# Patient Record
Sex: FEMALE | Employment: OTHER | URBAN - METROPOLITAN AREA
[De-identification: names, ages, dates, MRNs, and addresses within clinical notes are randomized per-mention and may not be internally consistent; named-entity substitution may affect disease eponyms.]

---

## 2018-06-15 ENCOUNTER — APPOINTMENT (OUTPATIENT)
Dept: ULTRASOUND IMAGING | Facility: CLINIC | Age: 38
End: 2018-06-15
Attending: EMERGENCY MEDICINE

## 2018-06-15 ENCOUNTER — HOSPITAL ENCOUNTER (EMERGENCY)
Facility: CLINIC | Age: 38
Discharge: HOME | End: 2018-06-15
Attending: EMERGENCY MEDICINE | Admitting: EMERGENCY MEDICINE

## 2018-06-15 VITALS
TEMPERATURE: 98.3 F | WEIGHT: 168 LBS | OXYGEN SATURATION: 98 % | HEART RATE: 98 BPM | SYSTOLIC BLOOD PRESSURE: 102 MMHG | BODY MASS INDEX: 30.91 KG/M2 | RESPIRATION RATE: 18 BRPM | HEIGHT: 62 IN | DIASTOLIC BLOOD PRESSURE: 90 MMHG

## 2018-06-15 DIAGNOSIS — O20.9 VAGINAL BLEEDING IN PREGNANCY, FIRST TRIMESTER: ICD-10-CM

## 2018-06-15 LAB
ABO + RH BLD: NORMAL
ABO + RH BLD: NORMAL
ALBUMIN UR-MCNC: NEGATIVE MG/DL
APPEARANCE UR: CLEAR
B-HCG SERPL-ACNC: 3443 IU/L (ref 0–5)
BILIRUB UR QL STRIP: NEGATIVE
BLOOD BANK CMNT PATIENT-IMP: NORMAL
BLOOD BANK CMNT PATIENT-IMP: NORMAL
COLOR UR AUTO: ABNORMAL
FETAL CELL SCN BLD QL ROSETTE: NORMAL
GLUCOSE BLDC GLUCOMTR-MCNC: 143 MG/DL (ref 70–99)
GLUCOSE UR STRIP-MCNC: NEGATIVE MG/DL
HGB UR QL STRIP: ABNORMAL
KETONES UR STRIP-MCNC: NEGATIVE MG/DL
LEUKOCYTE ESTERASE UR QL STRIP: NEGATIVE
NITRATE UR QL: NEGATIVE
PH UR STRIP: 6 PH (ref 5–7)
RBC #/AREA URNS AUTO: <1 /HPF (ref 0–2)
RH IG VIALS RECOM PATIENT: NORMAL
SOURCE: ABNORMAL
SP GR UR STRIP: 1 (ref 1–1.03)
SQUAMOUS #/AREA URNS AUTO: <1 /HPF (ref 0–1)
UROBILINOGEN UR STRIP-MCNC: NORMAL MG/DL (ref 0–2)
WBC #/AREA URNS AUTO: 0 /HPF (ref 0–5)

## 2018-06-15 PROCEDURE — 85461 HEMOGLOBIN FETAL: CPT | Performed by: EMERGENCY MEDICINE

## 2018-06-15 PROCEDURE — 99284 EMERGENCY DEPT VISIT MOD MDM: CPT | Mod: 25

## 2018-06-15 PROCEDURE — 81001 URINALYSIS AUTO W/SCOPE: CPT | Performed by: EMERGENCY MEDICINE

## 2018-06-15 PROCEDURE — 76801 OB US < 14 WKS SINGLE FETUS: CPT

## 2018-06-15 PROCEDURE — 00000146 ZZHCL STATISTIC GLUCOSE BY METER IP

## 2018-06-15 PROCEDURE — 86900 BLOOD TYPING SEROLOGIC ABO: CPT | Performed by: EMERGENCY MEDICINE

## 2018-06-15 PROCEDURE — 86901 BLOOD TYPING SEROLOGIC RH(D): CPT | Performed by: EMERGENCY MEDICINE

## 2018-06-15 PROCEDURE — 84702 CHORIONIC GONADOTROPIN TEST: CPT | Performed by: EMERGENCY MEDICINE

## 2018-06-15 ASSESSMENT — ENCOUNTER SYMPTOMS
CHILLS: 1
DIAPHORESIS: 1
BACK PAIN: 1
FEVER: 0
ABDOMINAL PAIN: 1
HEMATURIA: 1

## 2018-06-15 NOTE — DISCHARGE INSTRUCTIONS
Sangrado en la primera etapa del embarazo     La ecografía puede ayudar a comprobar la allan de matias feto.     Si ha sangrado en las primeras etapas de matias embarazo, sepa que no es la única. Muchas otras mujeres embarazadas cm tenido también sangrado al comienzo del embarazo. En la mayoría de los casos, no significa un problema. [place], incluso así, matias proveedor de atención médica tiene que saberlo, ya que quizás quiera hacerle pruebas para saber por qué ha sangrado. Llame a matias proveedor de atención médica si nota que ha sangrado mientras está embarazada.   Cuáles son las causas de un sangrado en las primeras etapas?  Con frecuencia, no se conocen las causas del sangrado en las primeras etapas. [place], hay muchos factores en el comienzo del embarazo que pueden ocasionar sangrado o manchado, por ejemplo, tino las relaciones sexuales (que pueden causar sangrado en cualquiera de los trimestres). Las siguientes son otras causas probables:    Implantación del embrión en la pared uterina    Hemorragia subcoriónica (sangrado entre el corion y el útero)    Aborto espontáneo    Embarazo ectópico (en rios de las trompas de Falopio)  Si nota un manchado  El manchado (causado por un sangrado muy leve) es el tipo de sangrado más común a principios del embarazo. Si lo nota, llame a matias proveedor de atención médica. Es muy probable que le diga que puede cuidarse en matias casa.  Si necesita hacerse algún examen  Según cuánto haya sangrado, puede que matias otro proveedor de atención médica le pidan hacerse algunos exámenes. Por ejemplo, un examen pélvico, puede ayudar a comprobar qué tan avanzado está el embarazo. También puede que le lupe rios ecografía común o rios ecografía Doppler. Estas pruebas de diagnóstico por imágenes emplean ondas de beka para comprobar la allan de matias feto. Pueden hacerle la ecografía sobre el vientre o por dentro de matias vagina. También es posible que matias proveedor de atención médica le pida un análisis de ivett  especial. [place] análisis compara maxx niveles hormonales en muestras de ivett tomadas con dos días de diferencia. Los resultados pueden ayudar a jeffrey proveedor de atención médica a saber más sobre la implantación del embrión. También necesitarán comprobar jeffrey tipo de ivett para evaluar si es necesario tratarla por sensibilización al factor RH.   Signos de advertencia  Si jeffrey sangrado no se detiene o si nota cualquiera de los siguientes síntomas, busque ayuda médica de inmediato:    Empapa rios toallita sanitaria por hora.    Tiene sangrado rebekah si tuviera jeffrey período.    Tiene cólicos o dolor de estómago muy intensos.    Se siente mareada o se desmaya.    Expulsa tejido a través de jeffrey vagina.    Tiene un sangrado en cualquier momento después de jeffrey primer trimestre.  Preguntas que quizás le lupe  Si garht no es normal, es común algo de sangrado a principios del embarazo. Si notó algo de sangrado, seguramente se preocupará. Tenga en cuenta que el sangrado por sí solo no significa que haya algún problema. No obstante, llame a jeffrey proveedor de atención médica de inmediato. Jeffrey proveedor puede hacerle preguntas rebekah estas para intentar detectar la causa del sangrado:     Cuándo comenzó el sangrado?     Es un sangrado muy leve (manchado) o se parece a jeffrey periodo?     Es la ivett de color ely brillante o es amarronada?     Miranda mantenido relaciones sexuales recientemente?     Ha tenido dolor o cólicos?     Se miranda sentido mareada o se ha desmayado?  Vigilancia de jeffrey embarazo  Con frecuencia, el sangrado se detendrá igual de rápido rebekah comenzó. Jeffrey embarazo puede, entonces, seguir jeffrey transcurso normal. Es posible que necesite algunas visitas prenatales adicionales. [place], usted y jeffrey bebé muy probablemente estarán garth.  Date Last Reviewed: 6/1/2016 2000-2017 [employer] [address] [place] [address], [place] [address]. Todos los derechos reservados. Esta información no pretende sustituir la atención médica profesional. Sólo  matias médico puede diagnosticar y tratar un problema de allan.      Bleeding During Early Pregnancy     Ultrasound can help check the health of your fetus.     If you ve had bleeding early in your pregnancy, you re not alone. Many other pregnant women have had early bleeding, too. And in most cases, nothing is wrong. But your healthcare provider still needs to know about it. He or she may want to do tests to find out why you re bleeding. Call your healthcare provider if you notice bleeding during pregnancy.  What causes early bleeding?  The cause of bleeding early in pregnancy is often unknown. But many factors early on in pregnancy may lead to bleeding or spotting. These include sexual intercourse, which may cause bleeding in any trimester. Here are some other causes:    Implantation of the embryo on the uterine wall    Subchorionic hemorrhage (bleeding between the sac membrane and the uterus)    Miscarriage    Ectopic (tubal) pregnancy  If you notice spotting  Spotting (very light bleeding) is the most common type of bleeding in early pregnancy. If you notice it, call your healthcare provider. Chances are, he or she will tell you that you can care for yourself at home.  If tests are needed  Depending on how much you bleed, your healthcare provider may ask you to come in for some tests. A pelvic exam, for instance, can help see how far along your pregnancy is. You also may have an ultrasound or a Doppler test. These imaging tests use sound waves to check the health of your fetus. The ultrasound may be done on your belly or inside your vagina. Your healthcare provider also may order a special blood test. This test compares your hormone levels in blood samples taken 2 days apart. The results can help your healthcare provider learn more about the implantation of the embryo. Your blood type will also need to be checked to evaluate whether you will need to be treated for Rh sensitization.   Warning signs  If your bleeding  doesn t stop or if you notice any of the following, seek medical help right away:    Soaking a sanitary pad each hour    Bleeding like you re having a period    Cramping or severe belly pain    Feeling dizzy or faint    Tissue passing through your vagina    Bleeding at any time after the first trimester  Questions you may be asked  Though not normal, bleeding early in pregnancy is common. If you ve noticed any bleeding, you may be concerned. But keep in mind that bleeding alone doesn t mean something is wrong. Call your healthcare provider right away, though. He or she may ask you questions like these to help find the cause of your bleeding:    When did your bleeding start?    Is your bleeding very light (spotting) or is it like a period?    Is the blood bright red or brownish?    Have you had sexual intercourse recently?    Have you had pain or cramping?    Have you felt dizzy or faint?  Monitoring your pregnancy  Bleeding will often stop as quickly as it began. Your pregnancy may go on a normal path again. You may need to make a few extra prenatal visits. But you and your baby will most likely be fine.  Date Last Reviewed: 6/1/2016 2000-2017 [employer]. [address] [place] [address], [place] [address]. All rights reserved. This information is not intended as a substitute for professional medical care. Always follow your healthcare professional's instructions.

## 2018-06-15 NOTE — ED AVS SNAPSHOT
Emergency Department    [address] [facility] [address]    Phone:  [phone]    Fax:  [phone]                                       Yanet Reyes Avila   MRN: [id number]    Department:   Emergency Department   Date of Visit:  6/15/2018           Patient Information     Date Of Birth          1980        Your diagnoses for this visit were:     Vaginal bleeding in pregnancy, first trimester        You were seen by Armando Armstrong MD.      Follow-up Information     Please follow up.    Why:  follow up with OBGYN this week        Discharge Instructions         Sangrado en la primera etapa del embarazo     La ecografía puede ayudar a comprobar la allan de matias feto.     Si ha sangrado en las primeras etapas de matias embarazo, sepa que no es la única. Muchas otras mujeres embarazadas cm tenido también sangrado al comienzo del embarazo. En la mayoría de los casos, no significa un problema. [place], incluso así, matias proveedor de atención médica tiene que saberlo, ya que quizás quiera hacerle pruebas para saber por qué ha sangrado. Llame a matias proveedor de atención médica si nota que ha sangrado mientras está embarazada.   Cuáles son las causas de un sangrado en las primeras etapas?  Con frecuencia, no se conocen las causas del sangrado en las primeras etapas. [place], hay muchos factores en el comienzo del embarazo que pueden ocasionar sangrado o manchado, por ejemplo, tino las relaciones sexuales (que pueden causar sangrado en cualquiera de los trimestres). Las siguientes son otras causas probables:    Implantación del embrión en la pared uterina    Hemorragia subcoriónica (sangrado entre el corion y el útero)    Aborto espontáneo    Embarazo ectópico (en rios de las trompas de Falopio)  Si nota un manchado  El manchado (causado por un sangrado muy leve) es el tipo de sangrado más común a principios del embarazo. Si lo nota, llame a matias proveedor de atención médica. Es muy probable que le diga que  puede cuidarse en matias casa.  Si necesita hacerse algún examen  Según cuánto haya sangrado, puede que matias otro proveedor de atención médica le pidan hacerse algunos exámenes. Por ejemplo, un examen pélvico, puede ayudar a comprobar qué tan avanzado está el embarazo. También puede que le lupe rios ecografía común o rios ecografía Doppler. Estas pruebas de diagnóstico por imágenes emplean ondas de beka para comprobar la allan de matias feto. Pueden hacerle la ecografía sobre el vientre o por dentro de matias vagina. También es posible que matias proveedor de atención médica le pida un análisis de ivett especial. [place] análisis compara maxx niveles hormonales en muestras de ivett tomadas con dos días de diferencia. Los resultados pueden ayudar a matias proveedor de atención médica a saber más sobre la implantación del embrión. También necesitarán comprobar matias tipo de ivett para evaluar si es necesario tratarla por sensibilización al factor RH.   Signos de advertencia  Si matias sangrado no se detiene o si nota cualquiera de los siguientes síntomas, busque ayuda médica de inmediato:    Empapa rios toallita sanitaria por hora.    Tiene sangrado rebekah si tuviera matias período.    Tiene cólicos o dolor de estómago muy intensos.    Se siente mareada o se desmaya.    Expulsa tejido a través de matias vagina.    Tiene un sangrado en cualquier momento después de matias primer trimestre.  Preguntas que quizás le lupe  Si garth no es normal, es común algo de sangrado a principios del embarazo. Si notó algo de sangrado, seguramente se preocupará. Tenga en cuenta que el sangrado por sí solo no significa que haya algún problema. No obstante, llame a matias proveedor de atención médica de inmediato. Matias proveedor puede hacerle preguntas rebekah estas para intentar detectar la causa del sangrado:     Cuándo comenzó el sangrado?     Es un sangrado muy leve (manchado) o se parece a matias periodo?     Es la ivett de color ely brillante o es amarronada?     Rodriguez mantenido  relaciones sexuales recientemente?     Ha tenido dolor o cólicos?     Se miranda sentido mareada o se ha desmayado?  Vigilancia de matias embarazo  Con frecuencia, el sangrado se detendrá igual de rápido rebekah comenzó. Matias embarazo puede, entonces, seguir matias transcurso normal. Es posible que necesite algunas visitas prenatales adicionales. [place], usted y matias bebé muy probablemente estarán garth.  Date Last Reviewed: 6/1/2016 2000-2017 [email or web address]. [address] [place] [address], [place] [address]. Todos los derechos reservados. Esta información no pretende sustituir la atención médica profesional. Sólo matias médico puede diagnosticar y tratar un problema de allan.      Bleeding During Early Pregnancy     Ultrasound can help check the health of your fetus.     If you ve had bleeding early in your pregnancy, you re not alone. Many other pregnant women have had early bleeding, too. And in most cases, nothing is wrong. But your healthcare provider still needs to know about it. He or she may want to do tests to find out why you re bleeding. Call your healthcare provider if you notice bleeding during pregnancy.  What causes early bleeding?  The cause of bleeding early in pregnancy is often unknown. But many factors early on in pregnancy may lead to bleeding or spotting. These include sexual intercourse, which may cause bleeding in any trimester. Here are some other causes:    Implantation of the embryo on the uterine wall    Subchorionic hemorrhage (bleeding between the sac membrane and the uterus)    Miscarriage    Ectopic (tubal) pregnancy  If you notice spotting  Spotting (very light bleeding) is the most common type of bleeding in early pregnancy. If you notice it, call your healthcare provider. Chances are, he or she will tell you that you can care for yourself at home.  If tests are needed  Depending on how much you bleed, your healthcare provider may ask you to come in for some tests. A pelvic exam, for instance, can help  see how far along your pregnancy is. You also may have an ultrasound or a Doppler test. These imaging tests use sound waves to check the health of your fetus. The ultrasound may be done on your belly or inside your vagina. Your healthcare provider also may order a special blood test. This test compares your hormone levels in blood samples taken 2 days apart. The results can help your healthcare provider learn more about the implantation of the embryo. Your blood type will also need to be checked to evaluate whether you will need to be treated for Rh sensitization.   Warning signs  If your bleeding doesn t stop or if you notice any of the following, seek medical help right away:    Soaking a sanitary pad each hour    Bleeding like you re having a period    Cramping or severe belly pain    Feeling dizzy or faint    Tissue passing through your vagina    Bleeding at any time after the first trimester  Questions you may be asked  Though not normal, bleeding early in pregnancy is common. If you ve noticed any bleeding, you may be concerned. But keep in mind that bleeding alone doesn t mean something is wrong. Call your healthcare provider right away, though. He or she may ask you questions like these to help find the cause of your bleeding:    When did your bleeding start?    Is your bleeding very light (spotting) or is it like a period?    Is the blood bright red or brownish?    Have you had sexual intercourse recently?    Have you had pain or cramping?    Have you felt dizzy or faint?  Monitoring your pregnancy  Bleeding will often stop as quickly as it began. Your pregnancy may go on a normal path again. You may need to make a few extra prenatal visits. But you and your baby will most likely be fine.  Date Last Reviewed: 6/1/2016 2000-2017 [employer]. [address] [place] [address], [place] [address]. All rights reserved. This information is not intended as a substitute for professional medical care. Always  follow your healthcare professional's instructions.            24 Hour Appointment Hotline       To make an appointment at any [facility], call [phone] ([phone]). If you don't have a family doctor or clinic, we will help you find one. [facility] are conveniently located to serve the needs of you and your family.             Review of your medicines      Notice     You have not been prescribed any medications.            Procedures and tests performed during your visit     Glucose by meter    HCG quantitative pregnancy (blood)    Rh Immune Globulin Study    Rho (D) immune globulin (RhoGam) Lab Study    UA with Microscopic    US OB < 14 Weeks w Transvaginal      Orders Needing Specimen Collection     None      Pending Results     No orders found from 6/13/2018 to 6/16/2018.            Pending Culture Results     No orders found from 6/13/2018 to 6/16/2018.            Pending Results Instructions     If you had any lab results that were not finalized at the time of your Discharge, you can call the ED Lab Result RN at [phone]. You will be contacted by this team for any positive Lab results or changes in treatment. The nurses are available 7 days a week from 10A to 6:30P.  You can leave a message 24 hours per day and they will return your call.        Test Results From Your Hospital Stay        6/15/2018  4:18 PM      Component Results     Component    Rhogam Order    Order received    Comment:    See Rhogam Study/Suitability         6/15/2018  4:20 PM      Component Results     Component Value Ref Range & Units Status    Color Urine Straw  Final    Appearance Urine Clear  Final    Glucose Urine Negative NEG^Negative mg/dL Final    Bilirubin Urine Negative NEG^Negative Final    Ketones Urine Negative NEG^Negative mg/dL Final    Specific Gravity Urine 1.003 1.003 - 1.035 Final    Blood Urine Trace (A) NEG^Negative Final    pH Urine 6.0 5.0 - 7.0 pH Final    Protein Albumin Urine Negative  NEG^Negative mg/dL Final    Urobilinogen mg/dL Normal 0.0 - 2.0 mg/dL Final    Nitrite Urine Negative NEG^Negative Final    Leukocyte Esterase Urine Negative NEG^Negative Final    Source Midstream Urine  Final    WBC Urine 0 0 - 5 /HPF Final    RBC Urine <1 0 - 2 /HPF Final    Squamous Epithelial /HPF Urine <1 0 - 1 /HPF Final         6/15/2018  5:08 PM      Narrative     US OB <14 WEEKS WITH TRANSVAGINAL SINGLE  6/15/2018 4:44 PM    HISTORY: Pelvic pain, vaginal spotting, early pregnancy.     TECHNIQUE: Transabdominal and transvaginal imaging were performed.   Transvaginal imaging was performed to better evaluate the uterus and  gestational sac.    COMPARISON:  None.    FINDINGS:      Estimated gestational age by current ultrasound measurement:  Gestational sac measures 5 weeks 3 days.  Estimated date of delivery based on this ultrasound: 1/10/2019.  Patient reported LMP: 4/5/2018.  Estimated gestational age by reported LMP: 10 weeks 1 day.    Crown-rump length: No fetal pole is currently identified.  Embryonic cardiac activity: Not applicable.  Yolk sac: Not identified.  Subchorionic hemorrhage: 0.4 x 0.3 x 0.2 cm.    Right ovary: Corpus luteal cyst.  Left ovary: Unremarkable.  Adnexal mass: None.  Free pelvic fluid: None.        Impression     IMPRESSION: Intrauterine gestational sac measures 5 weeks 3 days.  Gestational age by LMP dating should be 10 weeks 1 day with a stated  LMP date of 4/5/2018. Findings could be due to a nonviable pregnancy  or incorrect LMP dating. Follow-up beta-hCG and ultrasound as needed  for confirmation.    ELIZABETH CAPUTO MD         6/15/2018  4:53 PM      Component Results     Component Value Ref Range & Units Status    HCG Quantitative Serum 3443 (H) 0 - 5 IU/L Final         6/15/2018  4:46 PM      Component Results     Component    ABO    B    RH(D)    Pos    Fetal Blood Screen    Canceled, Test credited    Blood Bank Comment    Not suitable for Rh Immune Globulin  Patient is Rh  positive      Amount of RHIG Required    Not suitable for Rh Immune Globulin         6/15/2018  5:10 PM      Component Results     Component Value Ref Range & Units Status    Glucose 143 (H) 70 - 99 mg/dL Final                Clinical Quality Measure: Blood Pressure Screening     Your blood pressure was checked while you were in the emergency department today. The last reading we obtained was  BP: 102/90 . Please read the guidelines below about what these numbers mean and what you should do about them.  If your systolic blood pressure (the top number) is less than 120 and your diastolic blood pressure (the bottom number) is less than 80, then your blood pressure is normal. There is nothing more that you need to do about it.  If your systolic blood pressure (the top number) is 120-139 or your diastolic blood pressure (the bottom number) is 80-89, your blood pressure may be higher than it should be. You should have your blood pressure rechecked within a year by a primary care provider.  If your systolic blood pressure (the top number) is 140 or greater or your diastolic blood pressure (the bottom number) is 90 or greater, you may have high blood pressure. High blood pressure is treatable, but if left untreated over time it can put you at risk for heart attack, stroke, or kidney failure. You should have your blood pressure rechecked by a primary care provider within the next 4 weeks.  If your provider in the emergency department today gave you specific instructions to follow-up with your doctor or provider even sooner than that, you should follow that instruction and not wait for up to 4 weeks for your follow-up visit.        Thank you for choosing [facility]       Thank you for choosing [facility] for your care. Our goal is always to provide you with excellent care. Hearing back from our patients is one way we can continue to improve our services. Please take a few minutes to complete the written survey that you may  "receive in the mail after you visit with us. Thank you!        [facility] Information     [employer] lets you send messages to your doctor, view your test results, renew your prescriptions, schedule appointments and more. To sign up, go to [email or web address] . Click on \"Log in\" on the left side of the screen, which will take you to the Welcome page. Then click on \"Sign up Now\" on the right side of the page.     You will be asked to enter the access code listed below, as well as some personal information. Please follow the directions to create your username and password.     Your access code is: [email or web address]  Expires: 2018  4:39 PM     Your access code will  in 90 days. If you need help or a new code, please call your [place] clinic or [phone].        Care EveryWhere ID     This is your Care EveryWhere ID. This could be used by other organizations to access your [place] medical records  [id number]        Equal Access to Services     SASCHA MARQUEZ AH: Hadii justyna nowak hadasho Soomaali, waaxda luqadaha, qaybta kaalmada adeegyazara, dioni campo . So [facility] [phone].    ATENCIÓN: Si habla español, tiene a matias disposición servicios gratuitos de asistencia lingüística. Llame al [phone].    We comply with applicable federal civil rights laws and [place] laws. We do not discriminate on the basis of race, color, national origin, age, disability, sex, sexual orientation, or gender identity.            After Visit Summary       This is your record. Keep this with you and show to your community pharmacist(s) and doctor(s) at your next visit.                  "

## 2018-06-15 NOTE — ED PROVIDER NOTES
History     Chief Complaint:  Vaginal Bleeding     History limited due to language, interpretor used    HPI   Yanet Reyes Avila is a  38 year old female who presents to the emergency department today for evaluation of vaginal bleeding during pregnancy. The patient reports that about 3 weeks ago she took 2 home pregnancy tests and one test in her doctor office which confirmed she was pregnant. Since then she also has noted some right hip and back pain. She states she started bleeding today along with pain similar to her normal menstrual pain. She states she went to the bathroom and urinated and saw blood and 30 minutes later when she wiped after going to the bathroom again there was blood on the paper. She states she is now bleeding like her period and is wearing a pad. She states she thinking she is bleeding vaginally because there have been a few clots. She reports she is a diabetic and stopped taking her medication when she learned she was pregnant, and is currently checking her sugar 4 times a day. She denies any fevers, but says she woke up yesterday, 18, with chills and sweating. She reports her last period was around 2018. S    Allergies:  No Known Drug Allergies    Medications:    Metformin     Past Medical History:    Diabetes    Past Surgical History:    Surgical history reviewed. No pertinent surgical history.    Family History:    Family history reviewed. No pertinent family history.      Social History:  The patient was accompanied to the ED by her sisters.  Smoking Status: Never Smoker  Smokeless Tobacco: Never Used  Alcohol Use: Negative   Marital Status:  Single      Review of Systems   Constitutional: Positive for chills and diaphoresis. Negative for fever.   Gastrointestinal: Positive for abdominal pain.   Genitourinary: Positive for hematuria and vaginal bleeding.   Musculoskeletal: Positive for back pain.   All other systems reviewed and are negative.    Physical Exam  "    Patient Vitals for the past 24 hrs:   BP Temp Temp src Pulse Resp SpO2 Height Weight   06/15/18 1515 102/90 98.3  F (36.8  C) Oral 98 18 98 % 1.575 m (5' 2\") 76.2 kg (168 lb)        Physical Exam  SKIN:  Warm, dry.  HEMATOLOGIC/IMMUNOLOGIC/LYMPHATIC:  No pallor.  EYES:  Conjunctivae normal.  CARDIOVASCULAR:  Regular rate and rhythm.  RESPIRATORY:  No respiratory distress, breath sounds equal and normal.  GASTROINTESTINAL:  Soft, nontender abdomen.  MUSCULOSKELETAL:  Normal body habitus.  NEUROLOGIC:  Alert, conversant.  PSYCHIATRIC:  Normal mood.    Emergency Department Course     Imaging:  Radiology findings were communicated with the patient who voiced understanding of the findings.    US OB < 14 Weeks w Transvaginal   Intrauterine gestational sac measures 5 weeks 3 days.  Gestational age by LMP dating should be 10 weeks 1 day with a stated  LMP date of 4/5/2018. Findings could be due to a nonviable pregnancy  or incorrect LMP dating. Follow-up beta-hCG and ultrasound as needed  for confirmation.  Reading per radiology    Laboratory:  Laboratory findings were communicated with the patient who voiced understanding of the findings.    Rho (D) immune globulin lab study: order received  HCG quantitative pregnancy: 3443 (H)  Rh immune globulin study: ABO B, Rh(D) pos  UA with microscopic: blood trace (A), o/w WNL    Medications   Blood Bank will determine if patient is eligible for and the proper dosage of Rho (D) immune globulin (RhoGam) (not administered)   NO Rho (D)  immune globulin (RhoGam) needed  - mother INELIGIBLE (not administered)     Emergency Department Course:    1540 The patient provided a urine sample here in the emergency department. This was sent for laboratory testing, findings above.     1545 Nursing notes and vitals reviewed.    1546 I performed an exam of the patient as documented above.     1602 IV was inserted and blood was drawn for laboratory testing, results above.     1613 The patient " was sent for a US OB< 14 weeks while in the emergency department, results above.      1732 I personally reviewed the laboratory and imaging results with the patient and answered all related questions prior to discharge.    Impression & Plan      Medical Decision Making:  Yanet Reyes Avila is a 38 year old female who presents to the emergency department today for evaluation of concern of a first trimester pregnancy and vaginal bleeding. She underwent the above evaluation which was relatively reassuring. Signs of intrauterine pregnancy, but the dates are inconclusive. Certainly will nee follow up for all of this. She is Rh positive. I advised the patient on all of this  and she will need close follow up with the OB/GYN provider likely at [facility] where she has been seen as of late. She should return should she feel worse with severe bleeding or pain or otherwise.     Diagnosis:    ICD-10-CM    1. Vaginal bleeding in pregnancy, first trimester O20.9      Disposition:   The patient is discharged to home.     Discharge Medications:  No discharge medications    Scribe Disclosure:  I, Anabel Vega, am serving as a scribe at 3:46 PM on 6/15/2018 to document services personally performed by Armando Armstrong MD based on my observations and the provider's statements to me.        EMERGENCY DEPARTMENT       Armando Armstrong MD  06/15/18 3530

## 2018-06-15 NOTE — ED AVS SNAPSHOT
Emergency Department    [address] [facility] [address]    Phone:  [phone]    Fax:  [phone]                                       Yanet Reyes Avila   MRN: [id number]    Department:   Emergency Department   Date of Visit:  6/15/2018           After Visit Summary Signature Page     I have received my discharge instructions, and my questions have been answered. I have discussed any challenges I see with this plan with the nurse or doctor.    ..........................................................................................................................................  Patient/Patient Representative Signature      ..........................................................................................................................................  Patient Representative Print Name and Relationship to Patient    ..................................................               ................................................  Date                                            Time    ..........................................................................................................................................  Reviewed by Signature/Title    ...................................................              ..............................................  Date                                                            Time

## 2019-07-05 ENCOUNTER — OFFICE VISIT (OUTPATIENT)
Dept: OBGYN | Facility: CLINIC | Age: 39
End: 2019-07-05
Attending: ADVANCED PRACTICE MIDWIFE
Payer: MEDICAID

## 2019-07-05 ENCOUNTER — ANCILLARY PROCEDURE (OUTPATIENT)
Dept: ULTRASOUND IMAGING | Facility: CLINIC | Age: 39
End: 2019-07-05
Attending: ADVANCED PRACTICE MIDWIFE
Payer: MEDICAID

## 2019-07-05 VITALS
HEIGHT: 63 IN | HEART RATE: 77 BPM | DIASTOLIC BLOOD PRESSURE: 76 MMHG | SYSTOLIC BLOOD PRESSURE: 112 MMHG | WEIGHT: 173 LBS | BODY MASS INDEX: 30.65 KG/M2

## 2019-07-05 DIAGNOSIS — O09.90 SUPERVISION OF HIGH RISK PREGNANCY, ANTEPARTUM: Primary | ICD-10-CM

## 2019-07-05 DIAGNOSIS — K59.00 CONSTIPATION DURING PREGNANCY, ANTEPARTUM: ICD-10-CM

## 2019-07-05 DIAGNOSIS — Z34.91 ENCOUNTER FOR SUPERVISION OF NORMAL PREGNANCY IN FIRST TRIMESTER, UNSPECIFIED GRAVIDITY: ICD-10-CM

## 2019-07-05 DIAGNOSIS — O99.619 CONSTIPATION DURING PREGNANCY, ANTEPARTUM: ICD-10-CM

## 2019-07-05 DIAGNOSIS — O09.529 ANTEPARTUM MULTIGRAVIDA OF ADVANCED MATERNAL AGE: ICD-10-CM

## 2019-07-05 DIAGNOSIS — E11.8 TYPE 2 DIABETES MELLITUS WITH COMPLICATION, UNSPECIFIED WHETHER LONG TERM INSULIN USE: ICD-10-CM

## 2019-07-05 DIAGNOSIS — O21.9 NAUSEA AND VOMITING IN PREGNANCY: ICD-10-CM

## 2019-07-05 PROBLEM — Z12.9 SCREENING FOR CANCER: Status: ACTIVE | Noted: 2017-12-13

## 2019-07-05 PROBLEM — R79.89 ELEVATED LIVER FUNCTION TESTS: Status: ACTIVE | Noted: 2018-12-24

## 2019-07-05 PROBLEM — R87.619 ABNORMAL PAP SMEAR OF CERVIX: Status: ACTIVE | Noted: 2017-11-09

## 2019-07-05 LAB
ABO + RH BLD: NORMAL
ABO + RH BLD: NORMAL
BLD GP AB SCN SERPL QL: NORMAL
BLOOD BANK CMNT PATIENT-IMP: NORMAL
ERYTHROCYTE [DISTWIDTH] IN BLOOD BY AUTOMATED COUNT: 12.6 % (ref 10–15)
HBA1C MFR BLD: 8.2 % (ref 0–5.6)
HCT VFR BLD AUTO: 38.9 % (ref 35–47)
HGB BLD-MCNC: 12.9 G/DL (ref 11.7–15.7)
MCH RBC QN AUTO: 29.5 PG (ref 26.5–33)
MCHC RBC AUTO-ENTMCNC: 33.2 G/DL (ref 31.5–36.5)
MCV RBC AUTO: 89 FL (ref 78–100)
PLATELET # BLD AUTO: 231 10E9/L (ref 150–450)
RBC # BLD AUTO: 4.37 10E12/L (ref 3.8–5.2)
SPECIMEN EXP DATE BLD: NORMAL
WBC # BLD AUTO: 8.9 10E9/L (ref 4–11)

## 2019-07-05 PROCEDURE — 83036 HEMOGLOBIN GLYCOSYLATED A1C: CPT | Performed by: ADVANCED PRACTICE MIDWIFE

## 2019-07-05 PROCEDURE — 86901 BLOOD TYPING SEROLOGIC RH(D): CPT | Performed by: ADVANCED PRACTICE MIDWIFE

## 2019-07-05 PROCEDURE — 82306 VITAMIN D 25 HYDROXY: CPT | Performed by: ADVANCED PRACTICE MIDWIFE

## 2019-07-05 PROCEDURE — 76801 OB US < 14 WKS SINGLE FETUS: CPT

## 2019-07-05 PROCEDURE — 85027 COMPLETE CBC AUTOMATED: CPT | Performed by: ADVANCED PRACTICE MIDWIFE

## 2019-07-05 PROCEDURE — 86850 RBC ANTIBODY SCREEN: CPT | Performed by: ADVANCED PRACTICE MIDWIFE

## 2019-07-05 PROCEDURE — 36415 COLL VENOUS BLD VENIPUNCTURE: CPT | Performed by: ADVANCED PRACTICE MIDWIFE

## 2019-07-05 PROCEDURE — T1013 SIGN LANG/ORAL INTERPRETER: HCPCS | Mod: U3,ZF | Performed by: ADVANCED PRACTICE MIDWIFE

## 2019-07-05 PROCEDURE — 86803 HEPATITIS C AB TEST: CPT | Performed by: ADVANCED PRACTICE MIDWIFE

## 2019-07-05 PROCEDURE — 86762 RUBELLA ANTIBODY: CPT | Performed by: ADVANCED PRACTICE MIDWIFE

## 2019-07-05 PROCEDURE — 87086 URINE CULTURE/COLONY COUNT: CPT | Performed by: ADVANCED PRACTICE MIDWIFE

## 2019-07-05 PROCEDURE — 87340 HEPATITIS B SURFACE AG IA: CPT | Performed by: ADVANCED PRACTICE MIDWIFE

## 2019-07-05 PROCEDURE — 86900 BLOOD TYPING SEROLOGIC ABO: CPT | Performed by: ADVANCED PRACTICE MIDWIFE

## 2019-07-05 PROCEDURE — 86706 HEP B SURFACE ANTIBODY: CPT | Performed by: ADVANCED PRACTICE MIDWIFE

## 2019-07-05 PROCEDURE — 86780 TREPONEMA PALLIDUM: CPT | Performed by: ADVANCED PRACTICE MIDWIFE

## 2019-07-05 PROCEDURE — G0463 HOSPITAL OUTPT CLINIC VISIT: HCPCS | Mod: ZF,25

## 2019-07-05 PROCEDURE — 87389 HIV-1 AG W/HIV-1&-2 AB AG IA: CPT | Performed by: ADVANCED PRACTICE MIDWIFE

## 2019-07-05 RX ORDER — DOCUSATE SODIUM 100 MG/1
100 CAPSULE, LIQUID FILLED ORAL 2 TIMES DAILY
Qty: 90 CAPSULE | Refills: 6 | Status: SHIPPED | OUTPATIENT
Start: 2019-07-05 | End: 2020-02-28

## 2019-07-05 RX ORDER — PRENATAL VIT/IRON FUM/FOLIC AC 27MG-0.8MG
1 TABLET ORAL DAILY
COMMUNITY
End: 2019-10-22

## 2019-07-05 RX ORDER — PYRIDOXINE HCL (VITAMIN B6) 25 MG
25 TABLET ORAL EVERY 8 HOURS
Qty: 90 TABLET | Refills: 6 | Status: SHIPPED | OUTPATIENT
Start: 2019-07-05 | End: 2020-01-16

## 2019-07-05 ASSESSMENT — MIFFLIN-ST. JEOR: SCORE: 1438.85

## 2019-07-05 ASSESSMENT — PAIN SCALES - GENERAL: PAINLEVEL: EXTREME PAIN (8)

## 2019-07-05 NOTE — LETTER
"2019       RE: Yanet Reyes Avila  [address] [facility] [address]     Dear Colleague,    Thank you for referring your patient, Yanet Reyes Avila, to the [facility] at [facility]. Please see a copy of my visit note below.    S OB Intake note  Subjective   39 year old woman presents to clinic for initiation of OB care with FOB and in person .  Patient's last menstrual period was 2019.  at 6w3d by  Estimated Date of Delivery: 2020 based on US confirms. Reviewed dating ultrasound. Pregnancy is planned.      Symptoms since LMP include cramping, nausea, bowel changes, breast tenderness and fatigue. Patient has tried these relief measures: diet modification, small frequent meals and increased rest.  - constipation.  2x/day.  Diffifcult   - Nausea worse when brushing teeth.  Comes/goes other times. Open to Vitamin B6. No emesis.     - Genetic/Infection questionnaire completed, risks include AMA. Pt  does not have a recent known exposure to Parvo or CMV so IgG/IgM testing WILL NOT be ordered.   Have you traveled during the pregnancy?No  Have your sexual partner(s) travelled during the pregnancy?No    - Taking metformin.  Blood sugar when waking up and then sometimes another time in the afternoon. Blood sugars noted on her monitor between  150-298 for the past 2 weeks.  Hasn't seen Diabetes team in \"months\".       - Current Medications    Current Outpatient Medications   Medication Sig Dispense Refill     docusate sodium (COLACE) 100 MG capsule Take 1 capsule (100 mg) by mouth 2 times daily 90 capsule 6     metFORMIN (GLUCOPHAGE) 500 MG tablet Take 500 mg by mouth 2 times daily (with meals)       Prenatal Vit-Fe Fumarate-FA (PRENATAL MULTIVITAMIN W/IRON) 27-0.8 MG tablet Take 1 tablet by mouth daily       vitamin B6 (PYRIDOXINE) 25 MG tablet Take 1 tablet (25 mg) by mouth every 8 hours 90 tablet 6         - Co-morbids  "   Past Medical History:   Diagnosis Date     Diabetes (H)      - Risk for GDM - Has type 2 DM pre gestational     - High Risk for Pre E- meets one of following criteria The patient does h/o Pre Eclampsia, Current multi fetal gestation, Pre Gestational Diabetes (Type 1 or Type 2), chronic hypertension, renal disease, Autoimmune disease (systematic lupus erythematosus, antiphospholipid syndrome) so WILL NOT start low dose aspirin (81mg) starting between 12 and 28 weeks to prevent early onset preeclampsia.    - Moderate risk - Meets high risk criteris.     - The patient  does not have a history of spontaneous  birth so  WILL NOT consider progesterone starting at 16-20 weeks and/or serial transvaginal cervical length ultrasounds from 16-24 weeks.     -The patient does not have a history of immunosuppresion or HIV so Toxoplasma IgG/IgM WILL NOT be ordered.     PERSONAL/SOCIAL HISTORY  Partnerd, he's present and quiet but supportive.   Lives with sister and her family.  Employment: Stopped working. Has access to what she needs (Food, services).   History of anxiety or depression  Hormonal   Additional items: Has been given information regarding WIC    Objective  -VS: reviewed and within normal limits   -General appearance: no acute distress, patient is comfortable   NEUROLOGICAL/PSYCHIATRIC   - Orientated x3,   -Mood and affect: : normal     Assessment/Plan    39 year old  7w6d weeks of pregnancy with LARS of 2020 by LMP of Patient's last menstrual period was 2019.. Ultrasound confirms.   Outpatient Encounter Medications as of 2019   Medication Sig Dispense Refill     docusate sodium (COLACE) 100 MG capsule Take 1 capsule (100 mg) by mouth 2 times daily 90 capsule 6     metFORMIN (GLUCOPHAGE) 500 MG tablet Take 500 mg by mouth 2 times daily (with meals)       Prenatal Vit-Fe Fumarate-FA (PRENATAL MULTIVITAMIN W/IRON) 27-0.8 MG tablet Take 1 tablet by mouth daily       vitamin B6 (PYRIDOXINE) 25  MG tablet Take 1 tablet (25 mg) by mouth every 8 hours 90 tablet 6     No facility-administered encounter medications on file as of 7/5/2019.       Orders Placed This Encounter   Procedures     Hepatitis B surface antigen     CBC with platelets     HIV Antigen Antibody Combo     Rubella Antibody IgG Quantitative     Treponema Abs w Reflex to RPR and Titer     Hepatitis B Surface Antibody     Vitamin D Deficiency     Hepatitis C antibody     Hemoglobin A1c     ENDOCRINOLOGY ADULT REFERRAL     Diabetes Educator Referral     ABO/Rh type and screen         - Oriented to Practice, types of care, and how to reach a provider.  Pt aware of MD cares for pre existing type 2 DM.  Agreeable to establish care with diabetic educator and endocrine team.   - Patient received 1st trimester new OB education packet complete with aide of [employer] booklet including information on genetic screening test options.  - Patient would like to disucss options further at new OB visit which will be ordered at 1st OB exam.  - Educational handout on the prevention of infections diseases during pregnancy provided.  - Patient was encouraged to start prenatal vitamins as tolerated.    - Patient was sent to lab for routine OB labs including as above.  Hepatitis C screening was ordered.   - Reviewed recommendation for low dose aspirin daily to prevent pre eclampsia, pt agrees, follow up at NOB visit. Pt is agreeable but will need RX at NOB to start at 12 weeks.   - [facility] office location nearest patient information and pregnancy confirmation letter given.   - Pregnancy concerns to be addressed by provider at new OB exam include: history of diabetes melatus and genetic screening options.    Pt to RTO for NOB visit in 2 weeks with MD team if available and prn if questions or concerns      COLLEEN Zuluaga CNM

## 2019-07-05 NOTE — PROGRESS NOTES
"[facility] OB Intake note  Subjective   39 year old woman presents to clinic for initiation of OB care with FOB and in person .  Patient's last menstrual period was 2019.  at 6w3d by  Estimated Date of Delivery: 2020 based on US confirms. Reviewed dating ultrasound. Pregnancy is planned.      Symptoms since LMP include cramping, nausea, bowel changes, breast tenderness and fatigue. Patient has tried these relief measures: diet modification, small frequent meals and increased rest.  - constipation.  2x/day.  Diffifcult   - Nausea worse when brushing teeth.  Comes/goes other times. Open to Vitamin B6. No emesis.     - Genetic/Infection questionnaire completed, risks include AMA. Pt  does not have a recent known exposure to Parvo or CMV so IgG/IgM testing WILL NOT be ordered.   Have you traveled during the pregnancy?No  Have your sexual partner(s) travelled during the pregnancy?No    - Taking metformin.  Blood sugar when waking up and then sometimes another time in the afternoon. Blood sugars noted on her monitor between  150-298 for the past 2 weeks.  Hasn't seen Diabetes team in \"months\".       - Current Medications    Current Outpatient Medications   Medication Sig Dispense Refill     docusate sodium (COLACE) 100 MG capsule Take 1 capsule (100 mg) by mouth 2 times daily 90 capsule 6     metFORMIN (GLUCOPHAGE) 500 MG tablet Take 500 mg by mouth 2 times daily (with meals)       Prenatal Vit-Fe Fumarate-FA (PRENATAL MULTIVITAMIN W/IRON) 27-0.8 MG tablet Take 1 tablet by mouth daily       vitamin B6 (PYRIDOXINE) 25 MG tablet Take 1 tablet (25 mg) by mouth every 8 hours 90 tablet 6         - Co-morbids    Past Medical History:   Diagnosis Date     Diabetes (H)      - Risk for GDM - Has type 2 DM pre gestational     - High Risk for Pre E- meets one of following criteria The patient does h/o Pre Eclampsia, Current multi fetal gestation, Pre Gestational Diabetes (Type 1 or Type 2), chronic " hypertension, renal disease, Autoimmune disease (systematic lupus erythematosus, antiphospholipid syndrome) so WILL NOT start low dose aspirin (81mg) starting between 12 and 28 weeks to prevent early onset preeclampsia.    - Moderate risk - Meets high risk criteris.     - The patient  does not have a history of spontaneous  birth so  WILL NOT consider progesterone starting at 16-20 weeks and/or serial transvaginal cervical length ultrasounds from 16-24 weeks.     -The patient does not have a history of immunosuppresion or HIV so Toxoplasma IgG/IgM WILL NOT be ordered.     PERSONAL/SOCIAL HISTORY  Partnerd, he's present and quiet but supportive.   Lives with sister and her family.  Employment: Stopped working. Has access to what she needs (Food, services).   History of anxiety or depression  Hormonal   Additional items: Has been given information regarding WIC    Objective  -VS: reviewed and within normal limits   -General appearance: no acute distress, patient is comfortable   NEUROLOGICAL/PSYCHIATRIC   - Orientated x3,   -Mood and affect: : normal     Assessment/Plan    39 year old  7w6d weeks of pregnancy with LARS of 2020 by LMP of Patient's last menstrual period was 2019.. Ultrasound confirms.   Outpatient Encounter Medications as of 2019   Medication Sig Dispense Refill     docusate sodium (COLACE) 100 MG capsule Take 1 capsule (100 mg) by mouth 2 times daily 90 capsule 6     metFORMIN (GLUCOPHAGE) 500 MG tablet Take 500 mg by mouth 2 times daily (with meals)       Prenatal Vit-Fe Fumarate-FA (PRENATAL MULTIVITAMIN W/IRON) 27-0.8 MG tablet Take 1 tablet by mouth daily       vitamin B6 (PYRIDOXINE) 25 MG tablet Take 1 tablet (25 mg) by mouth every 8 hours 90 tablet 6     No facility-administered encounter medications on file as of 2019.       Orders Placed This Encounter   Procedures     Hepatitis B surface antigen     CBC with platelets     HIV Antigen Antibody Combo     Rubella  Antibody IgG Quantitative     Treponema Abs w Reflex to RPR and Titer     Hepatitis B Surface Antibody     Vitamin D Deficiency     Hepatitis C antibody     Hemoglobin A1c     ENDOCRINOLOGY ADULT REFERRAL     Diabetes Educator Referral     ABO/Rh type and screen         - Oriented to Practice, types of care, and how to reach a provider.  Pt aware of MD cares for pre existing type 2 DM.  Agreeable to establish care with diabetic educator and endocrine team.   - Patient received 1st trimester new OB education packet complete with aide of [employer] booklet including information on genetic screening test options.  - Patient would like to disucss options further at new OB visit which will be ordered at 1st OB exam.  - Educational handout on the prevention of infections diseases during pregnancy provided.  - Patient was encouraged to start prenatal vitamins as tolerated.    - Patient was sent to lab for routine OB labs including as above.  Hepatitis C screening was ordered.   - Reviewed recommendation for low dose aspirin daily to prevent pre eclampsia, pt agrees, follow up at NOB visit. Pt is agreeable but will need RX at NOB to start at 12 weeks.   - [facility] office location nearest patient information and pregnancy confirmation letter given.   - Pregnancy concerns to be addressed by provider at new OB exam include: history of diabetes melatus and genetic screening options.    Pt to RTO for NOB visit in 2 weeks with MD team if available and prn if questions or concerns      COLLEEN Zuluaga CNM

## 2019-07-05 NOTE — LETTER
July 5, 2019    To Whom It May Concern:    Yanet Reyes Avila is being seen in our clinic for prenatal care.      Her Estimated Date of Delivery: Feb 25, 2020.    The first day the third trimester:will be Nov 18, 2019    LMP:  Patient's last menstrual period was 05/11/2019.     Sincerely,        KIM Walsh CNM

## 2019-07-06 PROBLEM — Z28.39 RUBELLA NON-IMMUNE STATUS, ANTEPARTUM: Status: ACTIVE | Noted: 2019-07-06

## 2019-07-06 PROBLEM — Z78.9 NOT IMMUNE TO HEPATITIS B VIRUS: Status: ACTIVE | Noted: 2019-07-06

## 2019-07-06 PROBLEM — E55.9 VITAMIN D DEFICIENCY: Status: ACTIVE | Noted: 2019-07-06

## 2019-07-06 PROBLEM — O09.899 RUBELLA NON-IMMUNE STATUS, ANTEPARTUM: Status: ACTIVE | Noted: 2019-07-06

## 2019-07-06 LAB
BACTERIA SPEC CULT: NORMAL
DEPRECATED CALCIDIOL+CALCIFEROL SERPL-MC: 17 UG/L (ref 20–75)
HBV SURFACE AB SERPL IA-ACNC: 0 M[IU]/ML
HBV SURFACE AG SERPL QL IA: NONREACTIVE
HCV AB SERPL QL IA: NONREACTIVE
HIV 1+2 AB+HIV1 P24 AG SERPL QL IA: NONREACTIVE
Lab: NORMAL
RUBV IGG SERPL IA-ACNC: 9 IU/ML
SPECIMEN SOURCE: NORMAL
T PALLIDUM AB SER QL: NONREACTIVE

## 2019-07-09 ENCOUNTER — TELEPHONE (OUTPATIENT)
Dept: OBGYN | Facility: CLINIC | Age: 39
End: 2019-07-09

## 2019-07-09 NOTE — TELEPHONE ENCOUNTER
Diabetes Education Scheduling Outreach #1:    Call to patient to schedule. Left message with phone number to call to schedule.    Plan for 2nd outreach attempt within 2 business days.    Lea Zimmerman OnCall  Diabetes and Nutrition Scheduling

## 2019-07-11 ENCOUNTER — TELEPHONE (OUTPATIENT)
Dept: OBGYN | Facility: CLINIC | Age: 39
End: 2019-07-11

## 2019-07-11 NOTE — TELEPHONE ENCOUNTER
Patient is 7 weeks pregnant and calling today with dark brown spotting. Denies any bright red bleeding, clots, or cramping. She is worried because she has had miscarriage before. Nurse offered another viability scan but patient has anxiety related to transvaginal ultrasounds because she feels that is what ended her last pregnancy. Nurse assured patient that transvaginal ultrasounds are safe and do not cause miscarriages. Assured patient that dark brown spotting is not concerning as much as bright red bleeding and cramping. Patient chooses now to just wait and monitor. Will call back if bleeding increases.

## 2019-07-19 ENCOUNTER — HOSPITAL ENCOUNTER (OUTPATIENT)
Dept: ULTRASOUND IMAGING | Facility: CLINIC | Age: 39
Discharge: HOME | End: 2019-07-19
Attending: OBSTETRICS & GYNECOLOGY | Admitting: OBSTETRICS & GYNECOLOGY
Payer: MEDICAID

## 2019-07-19 ENCOUNTER — OFFICE VISIT (OUTPATIENT)
Dept: OBGYN | Facility: CLINIC | Age: 39
End: 2019-07-19
Attending: STUDENT IN AN ORGANIZED HEALTH CARE EDUCATION/TRAINING PROGRAM
Payer: MEDICAID

## 2019-07-19 VITALS
WEIGHT: 173 LBS | SYSTOLIC BLOOD PRESSURE: 105 MMHG | BODY MASS INDEX: 30.65 KG/M2 | HEART RATE: 85 BPM | DIASTOLIC BLOOD PRESSURE: 69 MMHG | HEIGHT: 63 IN

## 2019-07-19 DIAGNOSIS — Z3A.08 8 WEEKS GESTATION OF PREGNANCY: Primary | ICD-10-CM

## 2019-07-19 DIAGNOSIS — Z3A.08 8 WEEKS GESTATION OF PREGNANCY: ICD-10-CM

## 2019-07-19 DIAGNOSIS — O24.111 TYPE 2 DIABETES MELLITUS AFFECTING PREGNANCY IN FIRST TRIMESTER, ANTEPARTUM: ICD-10-CM

## 2019-07-19 LAB
ALT SERPL W P-5'-P-CCNC: 65 U/L (ref 0–50)
AST SERPL W P-5'-P-CCNC: 30 U/L (ref 0–45)
CREAT SERPL-MCNC: 0.31 MG/DL (ref 0.52–1.04)
CREAT UR-MCNC: 15 MG/DL
GFR SERPL CREATININE-BSD FRML MDRD: >90 ML/MIN/{1.73_M2}
PROT UR-MCNC: <0.05 G/L
PROT/CREAT 24H UR: NORMAL G/G CR (ref 0–0.2)
TSH SERPL DL<=0.005 MIU/L-ACNC: 1.63 MU/L (ref 0.4–4)

## 2019-07-19 PROCEDURE — 84450 TRANSFERASE (AST) (SGOT): CPT | Performed by: OBSTETRICS & GYNECOLOGY

## 2019-07-19 PROCEDURE — 84443 ASSAY THYROID STIM HORMONE: CPT | Performed by: OBSTETRICS & GYNECOLOGY

## 2019-07-19 PROCEDURE — 36415 COLL VENOUS BLD VENIPUNCTURE: CPT | Performed by: OBSTETRICS & GYNECOLOGY

## 2019-07-19 PROCEDURE — 84460 ALANINE AMINO (ALT) (SGPT): CPT | Performed by: OBSTETRICS & GYNECOLOGY

## 2019-07-19 PROCEDURE — 82565 ASSAY OF CREATININE: CPT | Performed by: OBSTETRICS & GYNECOLOGY

## 2019-07-19 PROCEDURE — G0463 HOSPITAL OUTPT CLINIC VISIT: HCPCS | Mod: ZF,25

## 2019-07-19 PROCEDURE — 84156 ASSAY OF PROTEIN URINE: CPT | Performed by: OBSTETRICS & GYNECOLOGY

## 2019-07-19 PROCEDURE — 76801 OB US < 14 WKS SINGLE FETUS: CPT

## 2019-07-19 PROCEDURE — T1013 SIGN LANG/ORAL INTERPRETER: HCPCS | Mod: U3

## 2019-07-19 RX ORDER — SENNA AND DOCUSATE SODIUM 50; 8.6 MG/1; MG/1
1 TABLET, FILM COATED ORAL 2 TIMES DAILY PRN
Qty: 30 TABLET | Refills: 3 | Status: ON HOLD | OUTPATIENT
Start: 2019-07-19 | End: 2019-12-08

## 2019-07-19 RX ORDER — ASPIRIN 81 MG/1
162 TABLET, CHEWABLE ORAL DAILY
Qty: 60 TABLET | Refills: 3 | Status: ON HOLD | OUTPATIENT
Start: 2019-07-19 | End: 2019-12-08

## 2019-07-19 RX ORDER — MULTIVIT-MIN/IRON/FOLIC ACID/K 18-600-40
1 CAPSULE ORAL DAILY
Qty: 90 CAPSULE | Refills: 3 | Status: SHIPPED | OUTPATIENT
Start: 2019-07-19

## 2019-07-19 ASSESSMENT — MIFFLIN-ST. JEOR: SCORE: 1438.85

## 2019-07-19 ASSESSMENT — PATIENT HEALTH QUESTIONNAIRE - PHQ9
SUM OF ALL RESPONSES TO PHQ QUESTIONS 1-9: 6
5. POOR APPETITE OR OVEREATING: SEVERAL DAYS

## 2019-07-19 ASSESSMENT — ANXIETY QUESTIONNAIRES
7. FEELING AFRAID AS IF SOMETHING AWFUL MIGHT HAPPEN: SEVERAL DAYS
GAD7 TOTAL SCORE: 5
6. BECOMING EASILY ANNOYED OR IRRITABLE: SEVERAL DAYS
3. WORRYING TOO MUCH ABOUT DIFFERENT THINGS: SEVERAL DAYS
2. NOT BEING ABLE TO STOP OR CONTROL WORRYING: SEVERAL DAYS
1. FEELING NERVOUS, ANXIOUS, OR ON EDGE: NOT AT ALL
5. BEING SO RESTLESS THAT IT IS HARD TO SIT STILL: NOT AT ALL

## 2019-07-19 ASSESSMENT — PAIN SCALES - GENERAL: PAINLEVEL: NO PAIN (0)

## 2019-07-19 NOTE — LETTER
2019       RE: Yanet Reyes Avila  [address]  [facility] [address]     Dear Colleague,    Thank you for referring your patient, Yanet Reyes Avila, to the [facility] at [facility]. Please see a copy of my visit note below.    [facility]  New Obstetrics Visit    HPI: 39 year old  at 8w3d by 6+3wk US here today for initial OB visit. She has noted blood on stool as she has constipation and was unable to  colace prescribed after her last visit- pharmacy said they did not have it. She does note pain with BM. Also, she had called  as she had brown/red discharge, that is now resolved, no cramping associated. Denies LOF or any heavier bleeding. She was worried about getting an US at that time as she had her miscarriage after a TVUS and thought it might be why she miscarried.    **iPad  used for encounter**    Preg c/b:  - AMA  - DM2, A1c 8.2 (19), on metformin 500mg BID, poor control  - LSIL 2019 & persistent HPV pos, colpo neg 2019, next pap 2020  - Hep B nonimmune, rubella equivocal  - H/o depression and abuse, no meds  - H/o transaminitis, HepB/C neg  - Vit D deficiency  - Obesity, BMI 30    OBHx  OB History    Para Term  AB Living   2 0 0 0 1 0   SAB TAB Ectopic Multiple Live Births   1 0 0 0 0      # Outcome Date GA Lbr Robin/2nd Weight Sex Delivery Anes PTL Lv   2 Current            1 SAB                GYN History  - LMP: Patient's last menstrual period was 2019.  - Pap Smears: LSIL 2019 > colpo neg 12 o'clock and ECC bx    PMHx:   Past Medical History:   Diagnosis Date     Diabetes (H)       PSHx: No past surgical history on file.   Meds:   Current Outpatient Medications   Medication     aspirin (ASA) 81 MG chewable tablet     Cholecalciferol (VITAMIN D) 2000 units CAPS     docusate sodium (COLACE) 100 MG capsule     metFORMIN (GLUCOPHAGE) 500 MG tablet     Prenatal Vit-Fe  "Fumarate-FA (PRENATAL MULTIVITAMIN W/IRON) 27-0.8 MG tablet     SENNA-docusate sodium (SENNA S) 8.6-50 MG tablet     vitamin B6 (PYRIDOXINE) 25 MG tablet     No current facility-administered medications for this visit.        Allergies:   No Known Allergies    ROS: 10-Point ROS negative except as noted in HPI    Physical Exam  /69   Pulse 85   Ht 1.6 m (5' 3\")   Wt 78.5 kg (173 lb)   LMP 2019   Breastfeeding? No   BMI 30.65 kg/m     Gen: Well-appearing, NAD  HEENT: Normocephalic, atraumatic  CV:  RRR  Pulm: Nonlabored breathing  Abd: Soft, non-tender, non-distended  Ext: No LE edema, extremities warm and well perfused    Pelvic: deferred      --Ideal BMI: 18.5-24.9  Current BMI: Body mass index is 30.65 kg/m .  --Underweight = <18.5  --Normal weight = 18.5-24.9  --Overweight = 25-29.9  --Obesity = >30       Assessment/Plan:  Ms. Yanet Reyes Avila is a 39 year old  at 8w3d by 6+3wk US, here for new OB visit. Pregnancy is complicated by DM2, h/o LSIL, hep B nonimmune, rubella equivocal.    #PNC: NOB labs: Rh pos, jn neg, Hgb 12.9, HepB/HepC/HIV/treponema NR, UCx 10-50K urogenital zaire   - GC/CT today via urine   - AMA: genetic counseling referral   - Hep B nonimmune, start vaccine series next visit, did discuss but no time for injection   - Rubella equivocal, repeat at 28wks w/ MMR postpartum PRN   - pelvic and breast exam deferred due to time, will do at next visit    #Constipation, ?hemorrhoids:  - Needs anal exam if still having blood on stool  - senna-docusate rx sent to pharmacy    #DM2: Increased incidence of adverse pregnancy outcome including preeclampsia, spontaneous , congenital malformation, & stillbirth with increased risk with a higher A1C. Glycemic control is important to prevent complications.  - Fasting and 2-hour postprandial glucose monitoring.  Goals of fasting levels below 95 mg/dL and postprandial levels below 120 mg/dL.  - Baseline labs: A1C 8.2 (), EKG, TSH, " HELLP labs, U P:C ordered. UCx and A1C qtrimester (done last visit)  - Ophthalmology, endocrine referral  -  surveillance: Lvl 2 US at 18-20 wk, fetal echo @ ~22wks (will be scheduled by MFM), serail growth startig at 24wks GA, twice weekly BPP starting at 32wks GA   - ASA 162mg to start at 12wks for preE prevention    #LSIL 2019 & persistent HPV pos: colpo (12oclock bx & ECC) neg 2019, next pap 2020    #H/o depression and abuse: Mood is good now, did not discuss in detail due to extensive time spent on diabetes and concerns regarding recent bleeding episode    #H/o transaminitis: AST 33 & ALT 55 (19). Hep B, Hep C neg  - repeat LFTs with baseline HELLP labs    #Vit D deficiency: Start vit D supplementation, sent rx    #Obesity, BMI 30    Follow up in 4 weeks in S clinic. Will need to f/u with endocrine, DM educator, and MFM in the interim    Staffed with Dr. Turner Thurman MD  OBGYN PGY-4  19     The Patient was seen in [facility] by HARDIK THURMAN.  I reviewed the history & exam. Assessment and plan were jointly made.    Judy Tompkins MD

## 2019-07-19 NOTE — PATIENT INSTRUCTIONS
Please go to lab today to get baseline labs drawn.    Check your blood glucose four times daily  - fasting blood glucose, goal is <95  - 2hrs after every meal, goal is <120     the aspirin at the pharmacy and when you are 12 weeks pregnant, take 2 tabs daily to prevent pre-eclampsia.    You have been referred to the diabetes educator, endocrinology, and ophthalmology. You will be called to make these appointments.    Make a follow up appointment for 4 weeks in our clinic. MFM will call you for a genetic counseling appointment in the next couple weeks.  ______________________    Por favor, vaya al laboratorio lauren para obtener laboratorios de referencia dibujados.    Revise matias glucosa en ivett cuatro veces al día  - glucosa en ivett en ayunas, el objetivo es <95  - 2 horas después de cada comida, la meta es <120    Recoger la aspirina en la farmacia y cuando esté embarazada de 12 semanas, sobeida 2 pestañas diarias para prevenir la preeclampsia.    Se le ha referido al educador en diabetes, endocrinología y oftalmología. Se le llamará para hacer estas citas.    Pao rios chantel de seguimiento para 4 semanas en nuestra clínica. MFM le llamará para rios chantel de asesoramiento genético en las próximas dos semanas.
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
## 2019-07-20 ASSESSMENT — ANXIETY QUESTIONNAIRES: GAD7 TOTAL SCORE: 5

## 2019-07-30 ENCOUNTER — ALLIED HEALTH/NURSE VISIT (OUTPATIENT)
Dept: EDUCATION SERVICES | Facility: CLINIC | Age: 39
End: 2019-07-30
Payer: MEDICAID

## 2019-07-30 DIAGNOSIS — E11.8 TYPE 2 DIABETES MELLITUS WITH COMPLICATION, UNSPECIFIED WHETHER LONG TERM INSULIN USE: Primary | ICD-10-CM

## 2019-07-30 DIAGNOSIS — O24.111 PREGNANCY COMPLICATED BY PRE-EXISTING TYPE 2 DIABETES IN FIRST TRIMESTER: Primary | ICD-10-CM

## 2019-07-30 PROCEDURE — G0108 DIAB MANAGE TRN  PER INDIV: HCPCS

## 2019-07-30 NOTE — PROGRESS NOTES
Diabetes Self-Management Training - Pregnancy in T2 Diabetes  New pregnancy, ~9 weeks    Ecuadorean  present.    Note she did fill out the questionnaire with - more info than we need today.  Not pulled into this note, but is in the screenings.    She has had DB x 2 years by report, had a pregnancy last year but lost the baby.   Marni describes that her blood sugars were 200 and she was on insulin.  She did have teaching at that time on diet.     SUBJECTIVE/OBJECTIVE:  Yanet Reyes Avila presents today for education related to gestational diabetes.  She is accompanied by self and     Patient's pregnancy diabetes management related comments/concerns: wants to review pregnancy in diabetes    Patient's emotional response to diabetes: expresses readiness to learn and concern for health and well-being    Relevant co-morbidities and related health problems:  Significant for:  Type 2 diabetes prior to pregnancy    Current health service and resource utilization related to diabetes (hyperglycemia, hypoglycemia, etc.):  None    LMP 05/11/2019     Pre pregnancy weight: 78.5kg    Weight gain 0 lbs at 8 weeks gestation.  Current weight 78.5 kg    Estimated Date of Delivery: Feb 25, 2020     MONITORING:  She is checking BG, fasting and 2 hours after meals.  Fasting= 135, 140, 144, 134  After lunch= 112, 162  After dinner= 132, 117    Says numbers have greatly decreased with pregnancy.    A1C on 7/5/19= 8.2%    History   Smoking Status     Never Smoker   Smokeless Tobacco     Never Used       Lifestyle and Health Behaviors:  Physical Activity: no regular exercise program but does some walking    Nutrition:  No nausea at this time  Patient eats 3 meals and 1 HS snacks per day.    Breakfast at 7-8 AM:  1 cup 1% milk and small slice bread OR milk and a banana  Snack:  none  Lunch 10-noon:  sandwich 2 wheat bread with chicken (at [employer]) OR corn tortilla with egg and salsa  Snack:  None OR steak  "with 2 corn tortillas and veggies  Dinner 4:30-5 PM:  2 tortillas with steak and tomato OR chicken with rice and fruit OR rice and beans with mole  Bedtime Snack 8:30-9:00 PM:  Milk with small cheese quesadilla (1 small tortilla) and cucumber    Other time(s) food is eaten? no     Beverages: Milk 2/day (no other drinks listed- can check on juice)    Cultural/Restorationist diet restrictions: not assessed     Biggest challenge to healthy eating is:  knowing what to eat    Pre-Thomas Vitamin: not assessed    Supplements: not assessed   Experiencing nausea?  No     Socio/Economic/Cultural considerations:  Support System: family, she lives with her sister and sister's      Cultural Influences/Ethnic Background:  Hungarian    Health Literacy/Numeracy:  \"With diabetes, it's helpful to use forms and log books to write down blood sugars and what you're eating at times to help understand how foods affect your blood sugars. With this in mind:    How confident are you at filling out medical forms, such as these by yourself?   Quite a bit (when asked to keep BG and food log)    Health Beliefs and Attitudes:   Stage of Change: ACTION (Actively working towards change)    ASSESSMENT:  Marni is aware of her T2 diabetes, had a pregnancy last year that ended with miscarriage.  She describes high BG with last pregnancy, also free supplies from the clinic she went to including insulin.   She recalls some of her teaching from last year.  Marni says that she was on insulin before, Lantus about 20 units but it was stopped. She says she is now taking 7 units of insulin at HS (though she does not know what kind).  She understands from provider that she will continue to take the Metformin at this time.     CDE called her after visit to ask more about insulin, she has been taking 7 units of NOVOLOG at HS.   This is leftover from last pregnancy, she says it is not .  Was advised not to take that since it is intended for " mealtime:      Marni does not have insurance right now, is working to get it. Plans to meet with [facility] Thursday to get help with insurance.   She has a meter, but it is name brand and strips are very expensive.   Discussed we can help to keep costs down around diabetes care.    She is not working at a job right now, but plans to start working in the next month.   She lives with her sister and sister's .    INTERVENTION:  Patient was instructed on Glucocard Shine XL meter.  Did not have time today to demo and get a BG. She will finish strips she has, then get Shine strips at [place] [facility]. [facility]    Requesting order for 16 units of  NPH insulin at  to safely lower fasting BG.   Instructed on vial and syringe as this is much less expensive than pens. Advised get it at [facility] (for [employer] brand).     Educational topics covered today:  Pathophysiology of increased insulin needs in pregnancy, Risks and Complications, Means of controlling BG, Using a Blood Glucose Monitor, Blood Glucose Goals, Logging and Interpreting Glucose Results, Ketone Testing, When to Call a Diabetes Educator or OB Provider, Healthy Eating During Pregnancy, Counting Carbohydrates, Meal Planning for pregnancy.   Also described follow up in detail: I will call her after contact with OB re: insulin, communicate by email 1-2x/week sending BG log sheet picture (electronic permission signed), follow up visit     Instructed on use of vial and syringe.     Educational materials provided today:   [place] Understanding Gestational Diabetes  GDM Log Book & Turkish language log sheets for 3 months  Turkish language materials including a carb list and GDM booklet  Glucocard Shine XL meter kit    Pt verbalized understanding of concepts discussed and recommendations provided today.     PLAN:  Check glucose 4 times daily, before breakfast and 1 hour after each meal.     Recommend initiate NPH insulin 16  units at HS (0.2/kg @ 78.5 kg); fasting BG 40-50 mg/dL over goal.   Post meals reported are at or close to goal at this time.     Meal plan: 2-3 carbs at breakfast, 3-4 carbs at lunch, 3-4 carbs at supper, 0-1 carbs at 3 snacks a day.  Follow consistent CHO meal plan, eat CHO and protein/fat at all meals/snacks.    Call/e-mail/MyChart message diabetes educator if 3 or more blood sugars are above the goal in 1 week.  Call/e-mail/MyChart message with questions/concerns.    FOLLOW-UP:  Follow up with diabetes educator in 1 week.  Call or e-mail with questions or concerns.  Appointment scheduled on 8/19/19.   Call patient with updated insulin information    Endo referral placed, she is not scheduled.     Adenike Concepcion RD, LD, CDE  Time Spent: 110 minutes on direct education  Encounter type: Individual    Any diabetes medication dose changes were made via the CDE Protocol and Collaborative Practice Agreement with the patient's OB/GYN provider. A copy of this encounter was shared with the provider.

## 2019-08-01 ENCOUNTER — PATIENT OUTREACH (OUTPATIENT)
Dept: EDUCATION SERVICES | Facility: CLINIC | Age: 39
End: 2019-08-01

## 2019-08-01 ENCOUNTER — PRE VISIT (OUTPATIENT)
Dept: MATERNAL FETAL MEDICINE | Facility: CLINIC | Age: 39
End: 2019-08-01

## 2019-08-01 ENCOUNTER — TELEPHONE (OUTPATIENT)
Dept: OBGYN | Facility: CLINIC | Age: 39
End: 2019-08-01

## 2019-08-01 ENCOUNTER — TRANSCRIBE ORDERS (OUTPATIENT)
Dept: MATERNAL FETAL MEDICINE | Facility: CLINIC | Age: 39
End: 2019-08-01

## 2019-08-01 DIAGNOSIS — O26.90 PREGNANCY RELATED CONDITION, ANTEPARTUM: Primary | ICD-10-CM

## 2019-08-01 DIAGNOSIS — O09.90 SUPERVISION OF HIGH RISK PREGNANCY, ANTEPARTUM: ICD-10-CM

## 2019-08-01 DIAGNOSIS — E11.8 TYPE 2 DIABETES MELLITUS WITH COMPLICATION, UNSPECIFIED WHETHER LONG TERM INSULIN USE: Primary | ICD-10-CM

## 2019-08-01 PROBLEM — O24.111 PREGNANCY COMPLICATED BY PRE-EXISTING TYPE 2 DIABETES IN FIRST TRIMESTER: Status: ACTIVE | Noted: 2019-08-01

## 2019-08-01 NOTE — TELEPHONE ENCOUNTER
Adenike Concepcion RD calling to inform us , Kael DM2, now pregnant, has been using an old novolog insulin previously prescribed she had left over at home. She will be seeing Endocrinology for f/u and treatment but needs a NPH insulin ordered. Marni's am FBS have been running between 130-140.  Informed her I will discuss with on call OB/GYN and get insulin order and Gloria will be f/u with Marni to get her more supplies and make sure Endo sees her for continued treatment..    Spoke to Dr Caraballo and she is ok prescribing NPH 16 units at HS until can see Endocrinology . Prescription e-prescribed.    Returned call to Adenike and she reports couldn't get her into [facility] so calling [facility] to see patient asap. She reports she will fax the referral to this clinic and expects they should be able to see Marni soon.  Adenike also will be calling Marni with poc.

## 2019-08-01 NOTE — PROGRESS NOTES
"Marni has T2D and now 8-9 weeks pregnant.  She was seen for initial diabetes re-education on 7/30.    She was pregnant in the last year but ended in miscarriage.   Per notes:  Check the [facility] notes from 6/18-6/25/2018.  By my estimates she had just started insulin and then miscarried.  Looks like they had recommended 20 units lantus and 7-7-7- novolog.     Note, at this time with available information, only her fasting BG readings appear high (130's to 140's).   Recommend initiate 16 unit NPH insulin at HS for best lowering morning BG levels 40-50 mg/dL (0.2units/kg)    The NPH is the least expensive and most effective agent for reducing the AM numbers.     (Note that Lantus is class C in pregnancy, if must change to 24 hour acting insulin, prefer Levemir)    An order for NPH was approved and sent to pharmacy by OB.     Additionally, an endocrinology referral was placed on 7/5.  Marni is not currently scheduled with endo.   [facility] how[facility] cannot get her in for some weeks, perhaps over 1 month.     CDE spoke to [facility]. Faxed referral to them. They believe they can get her in within a week.  However, they do not have an  to call and schedule patient.    Spoke to [facility], they are willing to assist the patient in scheduling endo appt if she is willing.     Called Marni: [place]   1) Change to the new bottle of insulin available at [facility] on [employer]. Take 16 units right before bed.   Reminded this is a very different insulin than what is in the pens- this works slowly overnight to get the morning number down to 95 or lower.   Luckily, it is also inexpensive $25.   Marni says \"I don't trust it\", if the pens cost more they should work better.  Discussed that isn't how it works and the insulin in the pens does not help the morning numbers.   Told her this is the preferred insulin for all moms with diabetes to get morning numbers " "down.     Offered to email a visual guide as reminder on how to use the vial and syringe, she says no, she remembers.     2) Do not take the insulin from the pens you have, it is not safe to take at bedtime, it works too hard and for only a couple of hours.      3) Would you like me to call the [place] prescription assistance program and ask if they can get in touch with you? Sure    At one point Marni says \"this is so much, so different\", she might have to go back to [place] where they gave her free insulin.   Also \"all the doctors\" are telling her to do something different ( I don't know who told her to take 7 units of Novolg at ).  Told her I spoke to the doctor today and doctor has ordered the new insulin.   Advised, again, that with her permission I will have someone from [place] prescription assistance program call her, they might be able to help with cost at [place].     Reviewed, she is trading the pen shot for a syringe shot, it does not have to be complicated. Just trading.   Making this change is to keep her and baby safe and healthy by getting numbers down to the goal.     She says ok, voices understanding. I asked if she can  the NPH today or tomorrow, she says ok.  Asked her to please send in numbers on Monday or Tuesday via email.     She is scheduled to see Dr. Lopez on 8/19.     Adenike Concepcion RD, LD, CDE      "

## 2019-08-02 ENCOUNTER — OFFICE VISIT (OUTPATIENT)
Dept: MATERNAL FETAL MEDICINE | Facility: CLINIC | Age: 39
End: 2019-08-02
Attending: OBSTETRICS & GYNECOLOGY
Payer: MEDICAID

## 2019-08-02 DIAGNOSIS — Z36.9 PRENATAL SCREENING ENCOUNTER: ICD-10-CM

## 2019-08-02 DIAGNOSIS — O26.90 PREGNANCY RELATED CONDITION, ANTEPARTUM: ICD-10-CM

## 2019-08-02 DIAGNOSIS — O09.521 SUPERVISION OF ELDERLY MULTIGRAVIDA IN FIRST TRIMESTER: Primary | ICD-10-CM

## 2019-08-02 PROCEDURE — 36415 COLL VENOUS BLD VENIPUNCTURE: CPT | Performed by: OBSTETRICS & GYNECOLOGY

## 2019-08-02 PROCEDURE — 40000072 ZZH STATISTIC GENETIC COUNSELING, < 16 MIN: Mod: ZF | Performed by: GENETIC COUNSELOR, MS

## 2019-08-02 PROCEDURE — 40000791 ZZHCL STATISTIC VERIFI PRENATAL TRISOMY 21,18,13: Performed by: OBSTETRICS & GYNECOLOGY

## 2019-08-02 PROCEDURE — T1013 SIGN LANG/ORAL INTERPRETER: HCPCS | Mod: U3,ZF | Performed by: GENETIC COUNSELOR, MS

## 2019-08-02 PROCEDURE — 96040 ZZH GENETIC COUNSELING, EACH 30 MINUTES: CPT | Performed by: GENETIC COUNSELOR, MS

## 2019-08-02 NOTE — PROGRESS NOTES
8/02 follow up:    Called Marni today with [place] , Carolin.     1) Asked if she was able to  the new (NPH) or if she has decided to use it.   She says that she did pick it up yesterday and took 16 units last night.  BG this AM= 118   Reminded it might take 3-4 days to see how much the new insulin can help, and that we will talk next week. She voices understanding    2) Advised that OB placed endo referral, wanted her to see a diabetes doctor to take good care of her and baby. She says she was aware of that but has not scheduled yet.   My understanding is that it may take weeks or >1 month for her to see a [facility] [facility].   Recommended [facility] (I spoke to them yesterday and faxed the referral). They think they could get her in in 1-2 weeks).    She says she is willing to go to [place] (Endo clinic), that would be ok.   Marni denies other questions for me.     I provided her the scheduling number for the [facility] and , Carolin, has stayed on the line to continue interpreting so she can schedule an appointment.     Plan to follow up with patient early next week.     Adenike Concepcion RD, LD, CDE

## 2019-08-02 NOTE — PROGRESS NOTES
[facility]  Genetic Counseling Consult    Patient: Yanet Reyes Avila YOB: 1980   Date of Service: 19      Yanet Reyes Avila was seen at [facility] for genetic consultation to discuss the options for screening and testing for fetal chromosome abnormalities.  The indication for genetic counseling is advanced maternal age. Sarah, a , (ID# [id number], agency: [facility]) was present at this appointment.       Impression/Plan:   1.  Marni had an ultrasound and blood draw for NIPT (Innatal test through [employer]).  Results are expected within 7-10 days, and will be available in [place].  We will contact her to discuss the results, and a copy will be forwarded to the office of the referring OB provider. Marni gave me permission to leave a detailed voicemail with her NIPT results with the sex of the baby.    2.  Maternal serum AFP (single marker screen) is recommended after 15 weeks to screen for open neural tube defects. A quad screen should not be performed.    3.  An 18-20 week comprehensive ultrasound is standard of care for all women 35 or older at delivery.    Pregnancy History:   /Parity:    Age at Delivery: 39 year old  LARS: 2020, by Ultrasound  Gestational Age: 10w3d    No significant complications or exposures were reported in the current pregnancy.    Marni berrios pregnancy history is significant for one spontaneous miscarriage at 8 weeks.    Medical History:   Marni berrios reported medical history is not expected to impact pregnancy management or risks to fetal development.       Family History:   A three-generation pedigree was obtained, and is scanned under the  Media  tab.   The following significant findings were reported by Marni:    Marni has a sister that passed away from diabetes complications at age 53. Marni also has type 2 diabetes currently.     Otherwise, the reported family history is  negative for multiple miscarriages, stillbirths, birth defects, cognitive impairment, known genetic conditions, and consanguinity.       Carrier Screening:   The patient reports that she and the father of the pregnancy have  ancestry:      Sickle cell anemia is an autosomal recessive genetic condition that occurs with increased frequency in individuals of  ancestry and carrier screening for this condition is available.  In addition,  screening in the [facility] includes sickle cell anemia.      Expanded carrier screening for mutations in a large panel of genes associated with autosomal recessive conditions including cystic fibrosis, spinal muscular atrophy, and others, is now available.      The patient has declined the carrier screening options reviewed today.       Risk Assessment for Chromosome Conditions:   We explained that the risk for fetal chromosome abnormalities increases with maternal age. We discussed specific features of common chromosome abnormalities, including Down syndrome, trisomy 13, trisomy 18, and sex chromosome trisomies.      - At age 39 at midtrimester, the risk to have a baby with Down syndrome is 1 in 168.     - At age 39 at midtrimester, the risk to have a baby with any chromosome abnormality is 1 in 82.          Testing Options:   We discussed the following options:   Non-invasive Prenatal Testing (NIPT)    Maternal plasma cell-free DNA testing; first trimester ultrasound with nuchal translucency and nasal bone assessment is recommended, when appropriate    Screens for fetal trisomy 21, trisomy 13, trisomy 18, and sex chromosome aneuploidy    Cannot screen for open neural tube defects; maternal serum AFP after 15 weeks is recommended       Genetic Amniocentesis    Invasive procedure typically performed in the second trimester by which amniotic fluid is obtained for the purpose of chromosome analysis and/or other prenatal genetic analysis    Diagnostic  results; >99% sensitivity for fetal chromosome abnormalities    AFAFP measurement tests for open neural tube defects       Comprehensive (Level II) ultrasound: Detailed ultrasound performed between 18-22 weeks gestation to screen for major birth defects and markers for aneuploidy.      We reviewed the benefits and limitations of this testing.  Screening tests provide a risk assessment specific to the pregnancy for certain fetal chromosome abnormalities, but cannot definitively diagnose or exclude a fetal chromosome abnormality.  Follow-up genetic counseling and consideration of diagnostic testing is recommended with any abnormal screening result.     Diagnostic tests carry inherent risks- including risk of miscarriage- that require careful consideration.  These tests can detect fetal chromosome abnormalities with greater than 99% certainty.  Results can be compromised by maternal cell contamination or mosaicism, and are limited by the resolution of cytogenetic G-banding technology.  There is no screening nor diagnostic test that can detect all forms of birth defects or mental disability.     It was a pleasure to be involved with Marni s care. Face-to-face time of the meeting was 50 minutes.      Amy Arthur MS  Genetic Counseling Intern  Maternal Fetal Medicine  [facility]   Phone: [phone]  Pager: [phone]  Email: [email or web address]

## 2019-08-06 ENCOUNTER — PATIENT OUTREACH (OUTPATIENT)
Dept: EDUCATION SERVICES | Facility: CLINIC | Age: 39
End: 2019-08-06

## 2019-08-06 NOTE — PROGRESS NOTES
CDE outreach call:  Patient instructed to follow up via E-mail with picture of logbook since starting insulin (8/5-8/6). CDE called patient with , patient reports she got busy and was going to send in readings today.  Requested patient send in a copy of BG log book this afternoon and she will get a call back with any changes.  Patient reports her blood sugars are coming down.  Patient verbalized understanding of how to send in readings.  If no blood sugars come in CDE team will contact patient tomorrow for verbal readings.    Ana Dias MS, RD, LD, CDE

## 2019-08-07 ENCOUNTER — PATIENT OUTREACH (OUTPATIENT)
Dept: EDUCATION SERVICES | Facility: CLINIC | Age: 39
End: 2019-08-07
Payer: MEDICAID

## 2019-08-07 DIAGNOSIS — O09.90 SUPERVISION OF HIGH RISK PREGNANCY, ANTEPARTUM: ICD-10-CM

## 2019-08-07 DIAGNOSIS — O24.111 PREGNANCY COMPLICATED BY PRE-EXISTING TYPE 2 DIABETES IN FIRST TRIMESTER: Primary | ICD-10-CM

## 2019-08-07 DIAGNOSIS — E11.8 TYPE 2 DIABETES MELLITUS WITH COMPLICATION, UNSPECIFIED WHETHER LONG TERM INSULIN USE: ICD-10-CM

## 2019-08-07 NOTE — PROGRESS NOTES
Pregnancy in T2 Diabetes Follow-up    Subjective/Objective:    Yanet Reyes Avila sent in blood glucose log for review. Last date of communication was: 8/01 to confirm NPH insulin start.    T2 diabetes in pregnancy is being managed with medications    Taking diabetes medications:   yes:     Diabetes Medication(s)     Biguanides       metFORMIN (GLUCOPHAGE) 500 MG tablet    Take 500 mg by mouth 2 times daily (with meals)    Insulin       insulin NPH (HUMULIN N/NOVOLIN N VIAL) 100 UNIT/ML vial    Inject 16 Units Subcutaneous At Bedtime          Estimated Date of Delivery: Feb 25, 2020    BG/Food Log:       Assessment:    Ketones: not checked at this time.   Fasting blood glucoses: 0% in target - but improving  After breakfast: 71% in target.  After lunch: 100% in target.  After dinner: 57% in target.    Plan/Response:  Recommend increase to insulin - NPH from 0-0-0-16 --> 0-0-0-19 (20%).    CDE reply:  Called Marni with [employer] : (#[id number])  She confirms taking 16 units NPH at HS right now.   Praised healthy numbers after meals and improvement in the morning numbers.   Recommend increase to 19 units starting tonight.   She asks is this ok for the baby? Responded yes, when the numbers are very close to normal, like 95 or lower, that is the healthiest for mom and baby.   Asked her to please send numbers again next Mon or Tues, she voices understanding. No additional questions.       Adenike Concepcion RD, LD, CDE      Any diabetes medication dose changes were made via the CDE Protocol and Collaborative Practice Agreement with the patient's OB/GYN provider. A copy of this encounter was shared with the provider.

## 2019-08-07 NOTE — PROGRESS NOTES
Patient emailed later on 8/6.  Called her back with insulin change 8/7.  See details of telephone encounter 8/7.  Adenike Concepcion RD, LD, CDE

## 2019-08-09 ENCOUNTER — TELEPHONE (OUTPATIENT)
Dept: MATERNAL FETAL MEDICINE | Facility: CLINIC | Age: 39
End: 2019-08-09

## 2019-08-09 NOTE — TELEPHONE ENCOUNTER
Called and discussed normal NIPT results with Marni. Results indicate NO ANEUPLOIDY DETECTED for chromosomes 21, 18, 13, or sex chromosomes (XX). An  was used to speak with Marni (ID: [id number], Lea).    This puts her current pregnancy at low risk for Down syndrome, trisomy 18, trisomy 13 and sex chromosome abnormalities. This test is reported to have the following sensitivities: Down syndrome- 99%, trisomy 18- 98%, and trisomy 13- 98%. Although these results are reassuring, this does not replace a standard chromosome analysis from a chorionic villus sampling or amniocentesis.     Level II ultrasound is recommended, given AMA.     MSAFP is the appropriate second trimester screening test for open neural tube defects; the maternal quad screen is not recommended.     Her results are available in her Epic chart for her primary OB to review.    Amy Arthur MS  Genetic Counseling Intern  Maternal Fetal Medicine  [facility]   Phone: [phone]  Pager: [phone]  Email: [email or web address]

## 2019-08-13 ENCOUNTER — PATIENT OUTREACH (OUTPATIENT)
Dept: EDUCATION SERVICES | Facility: CLINIC | Age: 39
End: 2019-08-13
Payer: MEDICAID

## 2019-08-13 ENCOUNTER — TELEPHONE (OUTPATIENT)
Dept: OBGYN | Facility: CLINIC | Age: 39
End: 2019-08-13

## 2019-08-13 DIAGNOSIS — E11.8 TYPE 2 DIABETES MELLITUS WITH COMPLICATION, UNSPECIFIED WHETHER LONG TERM INSULIN USE: Primary | ICD-10-CM

## 2019-08-13 DIAGNOSIS — O09.90 SUPERVISION OF HIGH RISK PREGNANCY, ANTEPARTUM: ICD-10-CM

## 2019-08-13 LAB — LAB SCANNED RESULT: NORMAL

## 2019-08-13 NOTE — PROGRESS NOTES
Pre-existing T2 Diabetes in pregnancy Follow-up    Subjective/Objective:    Yanet Reyes Avila sent in blood glucose log for review. Last date of communication was: 8/7/19.    Gestational diabetes is being managed with diet, activity and medications    Taking diabetes medications:   yes:     Diabetes Medication(s)     Biguanides       metFORMIN (GLUCOPHAGE) 500 MG tablet    Take 500 mg by mouth 2 times daily (with meals)    Insulin       insulin NPH (HUMULIN N/NOVOLIN N VIAL) 100 UNIT/ML vial    Inject 19 Units Subcutaneous At Bedtime          Estimated Date of Delivery: Feb 25, 2020    BG/Food Log:       Assessment:  Fasting blood sugars remain elevated despite insulin dose increase.     Wt Readings from Last 3 Encounters:   07/19/19 78.5 kg (173 lb)   07/05/19 78.5 kg (173 lb)   06/15/18 76.2 kg (168 lb)     Due to significantly elevated blood sugars recommend a 40% dose increase as previous 20% dose increase did not significantly lower blood sugar.       Ketones: none reported.   Fasting blood glucoses: 0% in target.  After breakfast: 86% in target.  After lunch: 100% in target.  After dinner: 100% in target.    Plan/Response:  Recommend increase to insulin - Humulin 0-0-0-19 --> 0-0-0-26.  CDE to call patient once dose increase is approved (above protocol increase).    CDE called patient 8/13/19: Instructed patient to increase to Humulin 0-0-0-23 units until increased dose approved by provider.  Called with  and left voicemail.       Ana Dias MS, RD, LD, CDE      Any diabetes medication dose changes were made via the CDE Protocol and Collaborative Practice Agreement with the patient's OB/GYN provider. A copy of this encounter was shared with the provider.

## 2019-08-13 NOTE — TELEPHONE ENCOUNTER
Left  mario Grider with  to call nurse triage back. We need to know if she went to [facility] at [place] location as planned.    Per epic ,she did send in her BS log to  [facility]

## 2019-08-14 NOTE — PROGRESS NOTES
Contacted patient with approval to increase to 26 units of NPH at bedtime per . Patient verbalized understanding and will email us her numbers again on Monday morning.    Zoe Zurita RD LD CDE

## 2019-08-16 NOTE — TELEPHONE ENCOUNTER
Spoke with Marni via   Services. Marni states she called [facility] but since her insurance is still pending she cannot make an appointment there.  Advised she be seen at [facility] and she agreed with plan. Phone number given to her for that clinic.    Called [facility] and left message for July to call Marni and schedule.

## 2019-08-19 ENCOUNTER — OFFICE VISIT (OUTPATIENT)
Dept: OBGYN | Facility: CLINIC | Age: 39
End: 2019-08-19
Attending: OBSTETRICS & GYNECOLOGY
Payer: MEDICAID

## 2019-08-19 VITALS
WEIGHT: 173.5 LBS | HEIGHT: 63 IN | BODY MASS INDEX: 30.74 KG/M2 | SYSTOLIC BLOOD PRESSURE: 108 MMHG | DIASTOLIC BLOOD PRESSURE: 70 MMHG | HEART RATE: 85 BPM

## 2019-08-19 DIAGNOSIS — O24.111 PREGNANCY COMPLICATED BY PRE-EXISTING TYPE 2 DIABETES IN FIRST TRIMESTER: ICD-10-CM

## 2019-08-19 DIAGNOSIS — O09.91 SUPERVISION OF HIGH RISK PREGNANCY IN FIRST TRIMESTER: Primary | ICD-10-CM

## 2019-08-19 DIAGNOSIS — O09.512 AMA (ADVANCED MATERNAL AGE) PRIMIGRAVIDA 35+, SECOND TRIMESTER: ICD-10-CM

## 2019-08-19 PROCEDURE — T1013 SIGN LANG/ORAL INTERPRETER: HCPCS | Mod: U3,ZF

## 2019-08-19 PROCEDURE — G0463 HOSPITAL OUTPT CLINIC VISIT: HCPCS | Mod: ZF

## 2019-08-19 ASSESSMENT — MIFFLIN-ST. JEOR: SCORE: 1441.12

## 2019-08-19 NOTE — PROGRESS NOTES
S:  Doing well, no complaints today.  Had normal NIPT, Level II still needs to be scheduled.  Has not been able to get in with endocrine clinic.  Has done diabetic education, has follow up scheduled tomorrow.  Has not seen ophtho yet. Current insulin is 23U NPH with evening meal.  Fasting blood sugars 105-110, 2h after lunch 94-97, lower than 110 after dinner    O: see flow    A:  40 y/o  at 12+6 weeks, doing well.  Pregnancy complicated by type II DM on insulin, obesity    P:  Referral for Level II sent, follow up with diabetic ed as planned.  Will have the triage RN try to follow up with her about ophtho and endocrine scheduling.  RTC 4 weeks    Kyleigh Lopez MD, FACOG

## 2019-08-19 NOTE — NURSING NOTE
Chief Complaint   Patient presents with     Prenatal Care     12w6d       See TERE [place] 8/19/2019

## 2019-08-19 NOTE — LETTER
2019       RE: Yanet Reyes Avila  [address]  [facility] [facility] [id number]     Dear Colleague,    Thank you for referring your patient, Yanet Reyes Avila, to the [facility]. Please see a copy of my visit note below.    S:  Doing well, no complaints today.  Had normal NIPT, Level II still needs to be scheduled.  Has not been able to get in with endocrine clinic.  Has done diabetic education, has follow up scheduled tomorrow.  Has not seen ophtho yet. Current insulin is 23U NPH with evening meal.  Fasting blood sugars 105-110, 2h after lunch 94-97, lower than 110 after dinner    O: see flow    A:  38 y/o  at 12+6 weeks, doing well.  Pregnancy complicated by type II DM on insulin, obesity    P:  Referral for Level II sent, follow up with diabetic ed as planned.  Will have the triage RN try to follow up with her about ophtho and endocrine scheduling.  RTC 4 weeks    Kyleigh Lopez MD, FACOG

## 2019-08-20 ENCOUNTER — TRANSCRIBE ORDERS (OUTPATIENT)
Dept: MATERNAL FETAL MEDICINE | Facility: CLINIC | Age: 39
End: 2019-08-20

## 2019-08-20 DIAGNOSIS — O26.90 PREGNANCY RELATED CONDITION, ANTEPARTUM: Primary | ICD-10-CM

## 2019-08-21 ENCOUNTER — PATIENT OUTREACH (OUTPATIENT)
Dept: EDUCATION SERVICES | Facility: CLINIC | Age: 39
End: 2019-08-21

## 2019-08-21 NOTE — PROGRESS NOTES
CDE outreach call:  Planned follow up canceled due to educator illness.  Called patient to request BG logs.    Patient has questions about her tongue feeling numb in the morning and she has metalic taste in mouth.  Reports this started happening with 26 units but is better since she decreased to 23 units.      FBG today-101  Takes insulin at 10pm    Discussed that symptoms could be low blood sugars, but would like patient to check blood sugar when she feels these symptoms.  Stated that insulin will start working hard about 4am so would recommend extra check around 4-5am OR when she feels numbness.  Requested patient E-mail in BG log book for CDE follow up call tomorrow.    Patient verbalized understanding.    Ana Dias MS, RD, LD, CDE

## 2019-08-22 ENCOUNTER — PATIENT OUTREACH (OUTPATIENT)
Dept: EDUCATION SERVICES | Facility: CLINIC | Age: 39
End: 2019-08-22
Payer: MEDICAID

## 2019-08-22 DIAGNOSIS — E11.8 TYPE 2 DIABETES MELLITUS WITH COMPLICATION, UNSPECIFIED WHETHER LONG TERM INSULIN USE: ICD-10-CM

## 2019-08-22 DIAGNOSIS — O24.111 PREGNANCY COMPLICATED BY PRE-EXISTING TYPE 2 DIABETES IN FIRST TRIMESTER: Primary | ICD-10-CM

## 2019-08-22 NOTE — PROGRESS NOTES
"Gestational Diabetes Follow-up    Subjective/Objective:    Yanet Reyes Avila sent in blood glucose log for review. Last date of communication was: 8/21- but not BG review.    Gestational diabetes is being managed with medications    Taking diabetes medications:   yes:     Diabetes Medication(s)     Biguanides       metFORMIN (GLUCOPHAGE) 500 MG tablet    Take 500 mg by mouth 2 times daily (with meals)    Insulin       insulin isophane human (HUMULIN N PEN) 100 UNIT/ML injection    Inject 26 Units Subcutaneous At Bedtime          Estimated Date of Delivery: Feb 25, 2020    BG/Food Log:       Assessment:    Note she decreased dose to 23 units insulin last night per complaints of tingle in tongue. Fasting numbers remain elevated, including highest number all week with decrease from 26 to 23 units.    Ketones: not checked.   Fasting blood glucoses: 0% in target.  After breakfast: 100% in target.  After lunch: 100% in target.  After dinner: 100% in target.    Plan/Response:  Recommend increase to insulin - 20% increase from 23 units to 28 units nightly.    Called Marni with :  Thanked her for sending in numbers- she tells me she is very busy, has things to do, so it is hard to send in numbers. Voiced understanding, but appreciation for sending.   Reviewed that numbers after meals are very healthy, all in goal! Doing well.   Those morning numbers are still not in the healthy zone, they are elevated.     Marni explains that she likes to eat fruit at night for a snack, keke, watermelon.   Explained that's fine, I'm not telling you not to have a snack. I'm saying the morning number has not yet been below 95 where it needs to be, so we need to increase insulin today. This morning was the highest number all week.  She voices understanding.   She confirms she took 23 units NPH last night. Advised increase to 28 units tonight. She says \"ok\".     She previously mentioned a funny taste in her mouth, see " yesterday's note. Was advised to check BG when this happens, she has not done that. She says sometimes she has a medicinal taste in her mouth.     She is to increase to 28 units and send in email Monday.       Adenike Concepcion RD, LD, CDE      Any diabetes medication dose changes were made via the CDE Protocol and Collaborative Practice Agreement with the patient's OB/GYN provider. A copy of this encounter was shared with the provider.

## 2019-08-27 ENCOUNTER — TELEPHONE (OUTPATIENT)
Dept: ENDOCRINOLOGY | Facility: CLINIC | Age: 39
End: 2019-08-27

## 2019-08-27 NOTE — TELEPHONE ENCOUNTER
[facility] Call Center    Phone Message    May a detailed message be left on voicemail: yes    Reason for Call: Appointment: New Patient Endocrine: Gestational Type 2 with Diabetes Education appointment.  Please call patient to schedule.  Referral in Epic from [facility]    Action Taken: Message routed to:  [facility] ([facility] clinic coord ENDO

## 2019-08-28 ENCOUNTER — PATIENT OUTREACH (OUTPATIENT)
Dept: EDUCATION SERVICES | Facility: CLINIC | Age: 39
End: 2019-08-28
Payer: MEDICAID

## 2019-08-28 DIAGNOSIS — E11.8 TYPE 2 DIABETES MELLITUS WITH COMPLICATION, UNSPECIFIED WHETHER LONG TERM INSULIN USE: ICD-10-CM

## 2019-08-28 NOTE — PROGRESS NOTES
Gestational Diabetes Follow-up    Subjective/Objective:    Yanet Reyes Avila sent in blood glucose log for review. Last date of communication was: 8/22/19.    Gestational diabetes is being managed with medications    Taking diabetes medications:   yes:     Diabetes Medication(s)     Biguanides       metFORMIN (GLUCOPHAGE) 500 MG tablet    Take 500 mg by mouth 2 times daily (with meals)    Insulin       insulin isophane human (HUMULIN N PEN) 100 UNIT/ML injection    Inject 28 Units Subcutaneous At Bedtime          Estimated Date of Delivery: Feb 25, 2020    BG/Food Log:       Assessment:    Ketones: na.   Fasting blood glucoses: 0% in target.  After breakfast: 100% in target.  After lunch: 100% in target.  After dinner: 100% in target.    Plan/Response:  Recommend increase to insulin - NPH 0-0-0-28--> 0-0-0-33 (20% increase).  Follow up Tuesday/Wednesday next week    Spoke to patient via , verbalized understanding of these recommendations.     Zoe Zurita RD LD CDE    Any diabetes medication dose changes were made via the CDE Protocol and Collaborative Practice Agreement with the patient's OB/GYN provider. A copy of this encounter was shared with the provider.

## 2019-09-06 ENCOUNTER — PATIENT OUTREACH (OUTPATIENT)
Dept: EDUCATION SERVICES | Facility: CLINIC | Age: 39
End: 2019-09-06
Payer: COMMERCIAL

## 2019-09-06 DIAGNOSIS — E11.8 TYPE 2 DIABETES MELLITUS WITH COMPLICATION, UNSPECIFIED WHETHER LONG TERM INSULIN USE: ICD-10-CM

## 2019-09-06 NOTE — PROGRESS NOTES
T2 pregnancy Diabetes Follow-up    Subjective/Objective:    Yanet Reyes Avila sent in blood glucose log for review. Last date of communication was: 8/28.    T2D in pregnancy is being managed with medications    Taking diabetes medications:   yes:     Diabetes Medication(s)     Biguanides       metFORMIN (GLUCOPHAGE) 500 MG tablet    Take 500 mg by mouth 2 times daily (with meals)    Insulin       insulin isophane human (HUMULIN N PEN) 100 UNIT/ML injection    Inject 33 Units Subcutaneous At Bedtime          Estimated Date of Delivery: Feb 25, 2020    BG/Food Log:       Assessment:    Ketones: not checked .   Fasting blood glucoses:25 % in target.  After breakfast: 100% in target.  After lunch: 100% in target.  After dinner: 100% in target.    Plan/Response:  Recommend increase to insulin - from 33 units at HS to 38 units (15% increase).  She is scheduled for appointment Monday in clinic.    Called Marni with  #[id number]-   Got voice mail and left message:  Valorie from [employer] calling. Please increase your insulin tonight to 38 units. I'll see you Monday at 2:30.     Adenike Concepcion RD, LD, CDE    Any diabetes medication dose changes were made via the CDE Protocol and Collaborative Practice Agreement with the patient's OB/GYN provider. A copy of this encounter was shared with the provider.

## 2019-09-09 ENCOUNTER — ALLIED HEALTH/NURSE VISIT (OUTPATIENT)
Dept: EDUCATION SERVICES | Facility: CLINIC | Age: 39
End: 2019-09-09
Payer: COMMERCIAL

## 2019-09-09 DIAGNOSIS — O24.111 PREGNANCY COMPLICATED BY PRE-EXISTING TYPE 2 DIABETES IN FIRST TRIMESTER: ICD-10-CM

## 2019-09-09 PROCEDURE — G0108 DIAB MANAGE TRN  PER INDIV: HCPCS

## 2019-09-09 NOTE — PROGRESS NOTES
Gestational Diabetes Follow-up in clinic:  Accompanied by partner (baby's dad) and [place] .    Subjective/Objective:    Yanet Reyes Scruggs sent in blood glucose log for review. Last date of communication was:  email.    Gestational diabetes is being managed with medications    Taking diabetes medications:   yes:     Diabetes Medication(s)     Biguanides       metFORMIN (GLUCOPHAGE) 500 MG tablet    Take 500 mg by mouth 2 times daily (with meals)    Insulin       insulin isophane human (HUMULIN N PEN) 100 UNIT/ML injection    Inject up to 55 units each night before bed.        Currently taking 38 units at HS.    Estimated Date of Delivery: 2020    BG/Food Log:   Feels the meal plan is going well.  First checks BG   Br = Drinks 8-12 oz 1% milk with her pre- vitamin, then drinks Herbalife shake - blended with ice, also aloe tea  ~ walks 1 hour then checks BG  Lea= Gets home and eats corn tortilla with cheese (2 quesadillas) or leftover stew with tortillas, checks BG 1 hour later   Din at 5-6= lentils with tacos or meat with salsa and beans, vegetable soup with meat   HS= cucumber or fruit (watermelon or keke) if hungry 1 quesadilla    Walking in the morning as possible, sees BG go lower sometimes, like 83 or 84.   Walks every every night.     She has a plan to start working soon hopefully, wanted to wait until 4 months (which will be tomorrow). 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    She says baby is growing as expected.     Does not have log book but 96, 97 over the weekend and 101 today.     Assessment:    Ketones: not reported- actually need to order strips.   Fasting blood glucoses: 0% in target.  Post-meal has been in target.    Plan/Response:  Recommend increase to insulin Recommended increase, patient reluctant. It makes her a little nervous.  Brief review and encouragement- placental hormones are working hard and will  increase as baby grows,insulin should be increased 1-2 times weekly to keep up with baby and get numbers under 95.    Provided samples of 0.5 and 1.0 mL syringes as her dose is increasing, She has just over 1 vial of insulin left by report and will finish it. Should last ~25 days.  Then she hopes to get pens with insurance coverage. Provided sample of pen needles.     She is willing to increase dose little by little, will go up to 42 units starting tonight.   Call in Thursday and will increase again if not under 95.     She just got her [employer] card today, saw OB who ordered pens (I will order needles) and Marni is scheduled for Endo appointment at [facility] tomorrow.     She has previously complained, and mentions today, funny taste/tingle in mouth when takes insulin, but not low.     Adenike Concepcion RD, LD, CDE  50 minutes spent on education.    Any diabetes medication dose changes were made via the CDE Protocol and Collaborative Practice Agreement with the patient's OB/GYN provider.

## 2019-09-10 ENCOUNTER — OFFICE VISIT (OUTPATIENT)
Dept: ENDOCRINOLOGY | Facility: CLINIC | Age: 39
End: 2019-09-10
Attending: OBSTETRICS & GYNECOLOGY
Payer: COMMERCIAL

## 2019-09-10 ENCOUNTER — TELEPHONE (OUTPATIENT)
Dept: OBGYN | Facility: CLINIC | Age: 39
End: 2019-09-10

## 2019-09-10 VITALS
WEIGHT: 176.9 LBS | BODY MASS INDEX: 30.2 KG/M2 | HEART RATE: 86 BPM | HEIGHT: 64 IN | SYSTOLIC BLOOD PRESSURE: 119 MMHG | DIASTOLIC BLOOD PRESSURE: 80 MMHG

## 2019-09-10 DIAGNOSIS — E11.8 TYPE 2 DIABETES MELLITUS WITH COMPLICATION, UNSPECIFIED WHETHER LONG TERM INSULIN USE: Primary | ICD-10-CM

## 2019-09-10 DIAGNOSIS — Z33.1 PREGNANCY, INCIDENTAL: ICD-10-CM

## 2019-09-10 LAB — HBA1C MFR BLD: 6 % (ref 4.3–6)

## 2019-09-10 ASSESSMENT — PAIN SCALES - GENERAL: PAINLEVEL: NO PAIN (0)

## 2019-09-10 ASSESSMENT — MIFFLIN-ST. JEOR: SCORE: 1472.41

## 2019-09-10 NOTE — PROGRESS NOTES
Endocrine Consult note    Attending Assessment/Plan :     Diabetes mellitus type 2. Fasting BSs are not at target.  I reinforced recommendation to increase HS NPH to 42 units.   I have counseled her on BS targets, reasons for treating diabetes in pregnancy.  Continue to be seen weekly either here with me, one of our clinic PAs, telephone visit or at [facility] DM education.    I note that it is possible her AM fasting glucose is higher than an earlier morning BS would be due to the insulin wearing off due to her sleeping in late.  We need  To consider this as we move forward. Once she starts working again she will be getting up earlier    Pregnancy, currently 16 weeks gestation    Metabolic syndrome with high TG, low HDL, DM    Lori Enamorado MD    Chief complaint:  Marni is a 39 year old female seen in consultation at the request of [facility] for diabetes.  It appears the referral to us was requested in 7/19 but not attempt to schedule it was made until 8/27.  I have reviewed Care Everywhere including  [facility], [facility] lab reports, imaging reports and provider notes as indicated.      HISTORY OF PRESENT ILLNESS  Marni  had already been seeing endocrinologist at [facility] (Dr Moody). The record shows a progression from prediabetes in 2009 to diabetes in 2016.  In 10/17 the A1c got to 10%.  At that time she presented with urinary frequency. She was not taking the metformin that had been previously prescribed.  She last saw Dr Moody on 6/18/18.  HgbA1c was 9.1% from 5/18.    That day she was noted to be pregnant and she was started on insulin.  6/21/18 she presented with miscarriage, after which she stopped the insuln.  She had 2  Diabetes education visit in the summer of 2018 .     Marni is currently 16 weeks pregnant, due 2/25/19. She says the pregnancy is going well.   She has been seeing various RD CDE dieticians in the  system since late July, 2019, where they have been adjusting the insulin.  NPH was started  7/30/19 with escalating doses since then.  A dose change was made 9/9, increasing NPH from 38 to 42 units. Today she says that she did not do this last night.  She gets a taste in her mouth , tongue tingling feeling in the AM when she increases the insulin.  She confirms that the BSs we have on the record have been measured in association with this symptom (they do not show hypoglycemia).      Current regimen:  NPH insulin 42 units-- she took 38 last night  Metformin 500 2 tablets ? Dose bid - ? 1000 bid    Download of the BS meter shows mean 107 with SD 12. Recent BSs  9/10/19: 101 at 1000; 88 at 1400 (2 hours PP)  9/9/19: 107 at noon; 120 at 1500 (2 hours PP  9/8/19L:101 at 1100    current diet:  Smaller portions  Gets up 10-11 AM, takes AM pills with milk,   BF 2 hours after getting up - couple of quesadillas  5 PM eats meat, bean, tortillas (2-3)  PM snack might be quesadillas and fruit   Avoids other snacks  Water   Rarely coke    We have the following labs:   11/27/07: prolactin 17.2, TSH 1.7, insulin 21.4  5/5/09: insulin 19.4\, , HDL 21, cholesterol 184  10/28/09: prolactin 13.1, 25OHD 17.1, HgbA1c 5.9%  6/3//10: FSH 6  8/13/13: HgbA1c 6.3%  7/10/15: HgbA1c 6.3%  10/4/16: HgbA1c 6.9%  10/19/17: HgbA1c 10%  5/29/18: HgbA1c 9.1%  12/20/18: HgbA1c 7.8%, , HDL 27, cholesterol 178  6/21/19 HgbA1c 8.6% , glucose 269, creatinine 0.45, at diagnosis of pregnancy  Today: HgbA1c 6%     REVIEW OF SYSTEMS  Weight up about 1 lbs since the pregnancy  Sleep is OK  Bedtime: 11 PM;  Ears buzz or hum all the time x about one year  Cardiac: negative  Respiratory: a little GONZALES on stairs  GI: constipation  No nocturia   Had bleeding a few days ago - didn't tell OB  Due 2/25/19; currently 16 weeks gestation  I forgot to ask about menarche - relative to her past history of primary amenorrhea.  Today she says her periods were infrequent prior to the lst pregnancy but they have been monthly since then, prior to the 2nd  "pregnancy.  10 system ROS otherwise as per the HPI or negative    Past Medical History  Past Medical History:   Diagnosis Date     DM2 (diabetes mellitus, type 2) (H) 2016     High triglycerides     600+     HPV (human papilloma virus) infection 2019     Infertility, female 2009     Miscarriage 06/21/2018     Obesity 2012     Prediabetes 2009     Primary amenorrhea 2007     No past surgical history on file.    Medications    Current Outpatient Medications   Medication Sig Dispense Refill     aspirin (ASA) 81 MG chewable tablet Take 2 tablets (162 mg) by mouth daily 60 tablet 3     blood glucose (NO BRAND SPECIFIED) test strip Use to test blood sugar 4 times daily or as directed. 150 strip 11     Cholecalciferol (VITAMIN D) 2000 units CAPS Take 1 capsule by mouth daily Take one capsule daily. 90 capsule 3     insulin isophane human (HUMULIN N PEN) 100 UNIT/ML injection Inject 42 units each night before bed. 10 mL 11     insulin pen needle (32G X 4 MM) 32G X 4 MM miscellaneous Use 1 pen needles daily as directed. 100 each 3     metFORMIN (GLUCOPHAGE) 500 MG tablet Take 500 mg by mouth 2 times daily (with meals)       Prenatal Vit-Fe Fumarate-FA (PRENATAL MULTIVITAMIN W/IRON) 27-0.8 MG tablet Take 1 tablet by mouth daily       docusate sodium (COLACE) 100 MG capsule Take 1 capsule (100 mg) by mouth 2 times daily (Patient not taking: Reported on 8/19/2019) 90 capsule 6     insulin syringe-needle U-100 (30G X 1/2\" 0.3 ML) 30G X 1/2\" 0.3 ML miscellaneous Use  syringes daily or as directed. (Patient not taking: Reported on 8/19/2019) 100 each 3     SENNA-docusate sodium (SENNA S) 8.6-50 MG tablet Take 1 tablet by mouth 2 times daily as needed (constipation) (Patient not taking: Reported on 8/19/2019) 30 tablet 3     vitamin B6 (PYRIDOXINE) 25 MG tablet Take 1 tablet (25 mg) by mouth every 8 hours (Patient not taking: Reported on 8/19/2019) 90 tablet 6     Allergies  No Known Allergies    Family History  family history " "includes Diabetes in her mother and sister.    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  GENERAL : pleasant gravid woman   In no apparent distress  SKIN: Normal color, normal temperature, texture.  No hirsutism, alopecia or purple striae.     EYES: PERRL, EOMI, No scleral icterus,  No proptosis, conjunctival redness, stare, retraction  MOUTH: Moist, pink; pharynx clear  NECK: No visible masses. No palpable adenopathy, or masses. No carotid bruits.   THYROID:  Not palpable  RESP: Lungs clear to auscultation bilaterally  CARDIAC: Regular rate and rhythm, normal S1 S2, without murmurs, rubs or gallops    ABDOMEN: gravid; Normal bowel sounds;   NEURO: awake, alert, responds appropriately to questions.  Cranial nerves intact.  Moves all extremities; No tremor of the outstretched hand.  DTRs  0 /4 ,   EXTREMITIES: No clubbing, cyanosis or edema.    DATA REVIEW    Results for REYES AVILA, YANET (MRN [id number]) as of 9/10/2019 21:24   Ref. Range 9/10/2019 00:00   Hemoglobin A1C Latest Ref Range: 4.3 - 6 % 6.0       "

## 2019-09-10 NOTE — LETTER
9/10/2019       RE: Yanet Reyes Avila  [address] [facility] [address]     Dear Colleague,    Thank you for referring your patient, Yanet Reyes Avila, to the [facility] ENDOCRINOLOGY at [facility]. Please see a copy of my visit note below.    Endocrine Consult note    Attending Assessment/Plan :     Diabetes mellitus type 2. Fasting BSs are not at target.  I reinforced recommendation to increase HS NPH to 42 units.   I have counseled her on BS targets, reasons for treating diabetes in pregnancy.  Continue to be seen weekly either here with me, one of our clinic PAs, telephone visit or at [facility] DM education.    I note that it is possible her AM fasting glucose is higher than an earlier morning BS would be due to the insulin wearing off due to her sleeping in late.  We need  To consider this as we move forward. Once she starts working again she will be getting up earlier    Pregnancy, currently 16 weeks gestation    Metabolic syndrome with high TG, low HDL, DM    Lori Enamorado MD    Chief complaint:  Marni is a 39 year old female seen in consultation at the request of [facility] for diabetes.  It appears the referral to us was requested in 7/19 but not attempt to schedule it was made until 8/27.  I have reviewed Care Everywhere including  [facility], [facility] lab reports, imaging reports and provider notes as indicated.      HISTORY OF PRESENT ILLNESS  Marni  had already been seeing endocrinologist at [facility] (Dr Moody). The record shows a progression from prediabetes in 2009 to diabetes in 2016.  In 10/17 the A1c got to 10%.  At that time she presented with urinary frequency. She was not taking the metformin that had been previously prescribed.  She last saw Dr Moody on 6/18/18.  HgbA1c was 9.1% from 5/18.    That day she was noted to be pregnant and she was started on insulin.  6/21/18 she presented with miscarriage, after which she stopped the insuln.  She had 2  Diabetes  education visit in the summer of 2018 .     Marni is currently 16 weeks pregnant, due 2/25/19. She says the pregnancy is going well.   She has been seeing various RD CDE dieticians in the  system since late July, 2019, where they have been adjusting the insulin.  NPH was started 7/30/19 with escalating doses since then.  A dose change was made 9/9, increasing NPH from 38 to 42 units. Today she says that she did not do this last night.  She gets a taste in her mouth , tongue tingling feeling in the AM when she increases the insulin.  She confirms that the BSs we have on the record have been measured in association with this symptom (they do not show hypoglycemia).      Current regimen:  NPH insulin 42 units-- she took 38 last night  Metformin 500 2 tablets ? Dose bid - ? 1000 bid    Download of the BS meter shows mean 107 with SD 12. Recent BSs  9/10/19: 101 at 1000; 88 at 1400 (2 hours PP)  9/9/19: 107 at noon; 120 at 1500 (2 hours PP  9/8/19L:101 at 1100    current diet:  Smaller portions  Gets up 10-11 AM, takes AM pills with milk,   BF 2 hours after getting up - couple of quesadillas  5 PM eats meat, bean, tortillas (2-3)  PM snack might be quesadillas and fruit   Avoids other snacks  Water   Rarely coke    We have the following labs:   11/27/07: prolactin 17.2, TSH 1.7, insulin 21.4  5/5/09: insulin 19.4\, , HDL 21, cholesterol 184  10/28/09: prolactin 13.1, 25OHD 17.1, HgbA1c 5.9%  6/3//10: FSH 6  8/13/13: HgbA1c 6.3%  7/10/15: HgbA1c 6.3%  10/4/16: HgbA1c 6.9%  10/19/17: HgbA1c 10%  5/29/18: HgbA1c 9.1%  12/20/18: HgbA1c 7.8%, , HDL 27, cholesterol 178  6/21/19 HgbA1c 8.6% , glucose 269, creatinine 0.45, at diagnosis of pregnancy  Today: HgbA1c 6%     REVIEW OF SYSTEMS  Weight up about 1 lbs since the pregnancy  Sleep is OK  Bedtime: 11 PM;  Ears buzz or hum all the time x about one year  Cardiac: negative  Respiratory: a little GONZALES on stairs  GI: constipation  No nocturia   Had bleeding a few  "days ago - didn't tell OB  Due 2/25/19; currently 16 weeks gestation  I forgot to ask about menarche - relative to her past history of primary amenorrhea.  Today she says her periods were infrequent prior to the lst pregnancy but they have been monthly since then, prior to the 2nd pregnancy.  10 system ROS otherwise as per the HPI or negative    Past Medical History  Past Medical History:   Diagnosis Date     DM2 (diabetes mellitus, type 2) (H) 2016     High triglycerides     600+     HPV (human papilloma virus) infection 2019     Infertility, female 2009     Miscarriage 06/21/2018     Obesity 2012     Prediabetes 2009     Primary amenorrhea 2007     No past surgical history on file.    Medications    Current Outpatient Medications   Medication Sig Dispense Refill     aspirin (ASA) 81 MG chewable tablet Take 2 tablets (162 mg) by mouth daily 60 tablet 3     blood glucose (NO BRAND SPECIFIED) test strip Use to test blood sugar 4 times daily or as directed. 150 strip 11     Cholecalciferol (VITAMIN D) 2000 units CAPS Take 1 capsule by mouth daily Take one capsule daily. 90 capsule 3     insulin isophane human (HUMULIN N PEN) 100 UNIT/ML injection Inject 42 units each night before bed. 10 mL 11     insulin pen needle (32G X 4 MM) 32G X 4 MM miscellaneous Use 1 pen needles daily as directed. 100 each 3     metFORMIN (GLUCOPHAGE) 500 MG tablet Take 500 mg by mouth 2 times daily (with meals)       Prenatal Vit-Fe Fumarate-FA (PRENATAL MULTIVITAMIN W/IRON) 27-0.8 MG tablet Take 1 tablet by mouth daily       docusate sodium (COLACE) 100 MG capsule Take 1 capsule (100 mg) by mouth 2 times daily (Patient not taking: Reported on 8/19/2019) 90 capsule 6     insulin syringe-needle U-100 (30G X 1/2\" 0.3 ML) 30G X 1/2\" 0.3 ML miscellaneous Use  syringes daily or as directed. (Patient not taking: Reported on 8/19/2019) 100 each 3     SENNA-docusate sodium (SENNA S) 8.6-50 MG tablet Take 1 tablet by mouth 2 times daily as needed " "(constipation) (Patient not taking: Reported on 8/19/2019) 30 tablet 3     vitamin B6 (PYRIDOXINE) 25 MG tablet Take 1 tablet (25 mg) by mouth every 8 hours (Patient not taking: Reported on 8/19/2019) 90 tablet 6     Allergies  No Known Allergies    Family History  family history includes Diabetes in her mother and sister.    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  GENERAL : pleasant gravid woman   In no apparent distress  SKIN: Normal color, normal temperature, texture.  No hirsutism, alopecia or purple striae.     EYES: PERRL, EOMI, No scleral icterus,  No proptosis, conjunctival redness, stare, retraction  MOUTH: Moist, pink; pharynx clear  NECK: No visible masses. No palpable adenopathy, or masses. No carotid bruits.   THYROID:  Not palpable  RESP: Lungs clear to auscultation bilaterally  CARDIAC: Regular rate and rhythm, normal S1 S2, without murmurs, rubs or gallops    ABDOMEN: gravid; Normal bowel sounds;   NEURO: awake, alert, responds appropriately to questions.  Cranial nerves intact.  Moves all extremities; No tremor of the outstretched hand.  DTRs  0 /4 ,   EXTREMITIES: No clubbing, cyanosis or edema.    DATA REVIEW    Results for REYES AVILA, YANET (MRN [id number]) as of 9/10/2019 21:24   Ref. Range 9/10/2019 00:00   Hemoglobin A1C Latest Ref Range: 4.3 - 6 % 6.0       Again, thank you for allowing me to participate in the care of your patient.      Sincerely,    Lori Enamorado MD      "

## 2019-09-10 NOTE — PATIENT INSTRUCTIONS
Increase NPH to 42 units at bedtime    check the blood sugar before breakfast  and 2 hours after each meal    Target GLUCOSE  Before meals < 95  1 hour after meals < 140  2 hours after meals < 120        Thank you for choosing [facility] for your health care needs. Below is some information for patients who are interested in having their follow-up visit with a physician by telephone. In some cases, a telephone visit can be an effective and convenient way to manage your follow-up care. Choosing a telephone visit rather than a face to face visit for your follow-up care is a decision that you and your physician can make together to ensure it meets all of your needs.  A face to face visit is always an available option, if you choose to do so.     We want to make sure you have all of the information you need about the telephone visit option and answer all of your questions before you decide to schedule a telephone follow-up visit. If you have any questions, you may talk to a staff member or our financial counselor at [phone].    1.  General overview  Our clinic sees patients for a variety of conditions and concerns. A face to face visit with your doctor is required for any new concerns or for your initial visit. If you and your doctor decide that a follow up visit by telephone is appropriate, you may decide to opt for a telephone visit.     2.  Billing and insurance coverage  There is a charge for telephone visits, similar to the charge for an in-person visit. Your bill is based on the amount of time you and your physician are on the phone. We will bill each visit to your insurance company (just like your other medical visits), and you will be responsible for any costs not paid by your insurance company. The charge may be denied by your insurance company, in which case you will be responsible for the entire amount billed. The decision to cover the cost is determined by your insurance company.  If you want to know what your insurance company will cover, we encourage you to contact them to determine your coverage. The codes below are the codes we use when billing for telephone visits and the associated charges. This may help you work with your insurance company to determine your benefits.   Billing CPT codes for Telephone visits    74447 ($30), 25642 ($35), 75943 ($40)     3. Updating your consent form  You may get contacted by a staff member about updating your consent form. If it needs updating prior to your telephone visit, please visit the link below, print it and fill it out and return it to us at [facility], Mail Code [id number], [address] [place] [address].   [email or web address]

## 2019-09-11 DIAGNOSIS — O20.9 BLEEDING IN EARLY PREGNANCY: Primary | ICD-10-CM

## 2019-09-11 NOTE — PROGRESS NOTES
Pt called to report bleeding in pregnancy- 16w1d with history of first trimester miscarriage. Pt reports small amount bright red blood. A few drops in toilet and on tissue with wiping on one instance. Denies pain, cramping. Pt denies recent intercourse. Pt expresses fear that she is miscarrying. Nurse advised we can schedule ultrasound, patient agrees with plan.      Advised report to ED if develops heavy bleeding or severe cramping pain. Pt expressed understanding and has no further questions at this time      Scheduled with Dr. Lopez tomorrow, ultrasound prior.

## 2019-09-12 ENCOUNTER — OFFICE VISIT (OUTPATIENT)
Dept: OBGYN | Facility: CLINIC | Age: 39
End: 2019-09-12
Attending: OBSTETRICS & GYNECOLOGY
Payer: COMMERCIAL

## 2019-09-12 ENCOUNTER — ANCILLARY PROCEDURE (OUTPATIENT)
Dept: ULTRASOUND IMAGING | Facility: CLINIC | Age: 39
End: 2019-09-12
Attending: OBSTETRICS & GYNECOLOGY
Payer: COMMERCIAL

## 2019-09-12 VITALS
BODY MASS INDEX: 29.98 KG/M2 | DIASTOLIC BLOOD PRESSURE: 79 MMHG | WEIGHT: 175.6 LBS | SYSTOLIC BLOOD PRESSURE: 121 MMHG | HEART RATE: 84 BPM | HEIGHT: 64 IN

## 2019-09-12 DIAGNOSIS — O46.90 VAGINAL BLEEDING DURING PREGNANCY: ICD-10-CM

## 2019-09-12 DIAGNOSIS — O20.9 BLEEDING IN EARLY PREGNANCY: ICD-10-CM

## 2019-09-12 DIAGNOSIS — O41.00X0 ANTEPARTUM ANHYDRAMNIOS, SINGLE OR UNSPECIFIED FETUS: Primary | ICD-10-CM

## 2019-09-12 PROCEDURE — 76815 OB US LIMITED FETUS(S): CPT

## 2019-09-12 ASSESSMENT — MIFFLIN-ST. JEOR: SCORE: 1466.52

## 2019-09-12 NOTE — PROGRESS NOTES
"[facility]  Return OB Visit    S: Patient presents today for Ob follow-up. Reported a few drops of bright red vaginal bleeding, therefore an ultrasound was performed prior to the visit. US demonstrated a low lying vs. marginal placenta and oligohydramnios, without a 2x2 cm pocket.    Patient states she first noticed some dark brown spotting early in pregnancy, in July. At that time US showed no abnormalities in the early pregnancy. She had a couple more episodes of dark brown spotting, but did not have bright red bleeding until 4-5 days ago. She noted a few drops of bright red blood when wiping 4-5 days ago. Denies leaking of fluid, abdominal pain, or contractions. This is a highly desired pregnancy.     O: /79   Pulse 84   Ht 1.626 m (5' 4\")   Wt 79.7 kg (175 lb 9.6 oz)   LMP 2019   BMI 30.14 kg/m      Gen: Well-appearing, NAD    FHR: 158 bpm by US    A/P:  Yanet Reyes Avila is a 39 year old  at 16w2d by 6w3d US, here for return OB visit. Due to vaginal spotting, patient had US performed today which showed a marginal vs. low lying placenta, but also oligohydramnios vs. anhydramnios. This likely represents PPROM, which could have occurred secondary to bleeding. Counseled patient that the prognosis for her fetus is quite poor and there is a high likelihood of fetal demise. Discussed that even if she is able to stay pregnant until viability, she is at risk for developing infection and placental abruption. Discussed that it would reasonable at this point to proceed with termination induction or D&E. Counseled patient that the risks of these procedures increase (bleeding, infection) the longer she remains pregnant, especially if fetal demise occurs. Patient does not wish to terminate the pregnancy at this time. Would like to undergo more comprehensive evaluation by [facility]. Referral placed, patient will be seen in [facility] clinic tomorrow. Follow up with depend on outcome of that visit. Discussed with " patient that she should call or present to ED immediately if she experiences contractions, heavier vaginal bleeding, or fever. Patient expressed understanding of treatment plan.     Staffed with Dr. Caraballo.    Jacki Randolph MD  Obstetrics and Gynecology, PGY-2  September 12, 2019 , 3:00 PM      The Patient was seen in Res[facility] Continuity Clinic by    JACKI RANDOLPH  [facility].  I reviewed the history & exam. Assessment and plan were jointly made.    Yue Caraballo MD

## 2019-09-12 NOTE — LETTER
"2019       RE: Yanet Reyes Avila  [address]  [place] [place] [address]     Dear Colleague,    Thank you for referring your patient, Yanet Reyes Avila, to the [facility] at [facility]. Please see a copy of my visit note below.    [facility]  Return OB Visit    S:Patient presents today for Ob follow-up. Reported a few drops of bright red vaginal bleeding, therefore an ultrasound was performed prior to the visit. US demonstrated a low lying vs. marginal placenta and oligohydramnios, without a 2x2 cm pocket.    Patient states she first noticed some dark brown spotting early in pregnancy, in July. At that time US showed no abnormalities in the early pregnancy. She had a couple more episodes of dark brown spotting, but did not have bright red bleeding until 4-5 days ago. She noted a few drops of bright red blood when wiping 4-5 days ago. Denies leaking of fluid, abdominal pain, or contractions. This is a highly desired pregnancy.     O:/79   Pulse 84   Ht 1.626 m (5' 4\")   Wt 79.7 kg (175 lb 9.6 oz)   LMP 2019   BMI 30.14 kg/m       Gen:Well-appearing, NAD    FHR:158 bpm by US    A/P:Yanet Reyes Avila is a 39 year old  at 16w2d by 6w3d US, here for return OB visit. Due to vaginal spotting, patient had US performed today which showed a marginal vs. low lying placenta, but also oligohydramnios vs. anhydramnios. This likely represents PPROM, which could have occurred secondary to bleeding. Counseled patient that the prognosis for her fetus is quite poor and there is a high likelihood of fetal demise. Discussed that even if she is able to stay pregnant until viability, she is at risk for developing infection and placental abruption. Discussed that it would reasonable at this point to proceed with termination induction or D&E. Counseled patient that the risks of these procedures increase (bleeding, infection) the longer she remains " pregnant, especially if fetal demise occurs. Patient does not wish to terminate the pregnancy at this time. Would like to undergo more comprehensive evaluation by [facility]. Referral placed, patient will be seen in MFM clinic tomorrow. Follow up with depend on outcome of that visit. Discussed with patient that she should call or present to ED immediately if she experiences contractions, heavier vaginal bleeding, or fever. Patient expressed understanding of treatment plan.     Staffed with Dr. Caraballo.    Jacki Villalta MD  Obstetrics and Gynecology, PGY-2  September 12, 2019 , 3:00 PM

## 2019-09-12 NOTE — LETTER
September 12, 2019        Yanet Reyes Avila  [address] [place] [id number]    To Whom it May Concern:    Yanet Reyes Avila was seen in our office on 9/12/2019 and was given the following instructions:  Patient is to discontinue work on 9/18/19 until further notice.    Sincerely,    Yue Caraballo MD

## 2019-09-12 NOTE — LETTER
September 12, 2019        Yanet Reyes Avila  [address] [place] [id number]    To Whom it May Concern:    Yanet Reyes Avila was seen in our office on 9/12/2019 and was given the following instructions:  Patient is to discontinue work on 9/16/19 until further notice.    Sincerely,        Yue Caraballo MD

## 2019-09-13 ENCOUNTER — HOSPITAL ENCOUNTER (OUTPATIENT)
Dept: ULTRASOUND IMAGING | Facility: CLINIC | Age: 39
Discharge: HOME | End: 2019-09-13
Attending: OBSTETRICS & GYNECOLOGY | Admitting: OBSTETRICS & GYNECOLOGY
Payer: COMMERCIAL

## 2019-09-13 ENCOUNTER — OFFICE VISIT (OUTPATIENT)
Dept: MATERNAL FETAL MEDICINE | Facility: CLINIC | Age: 39
End: 2019-09-13
Attending: OBSTETRICS & GYNECOLOGY
Payer: COMMERCIAL

## 2019-09-13 DIAGNOSIS — E11.8 TYPE 2 DIABETES MELLITUS WITH COMPLICATION, UNSPECIFIED WHETHER LONG TERM INSULIN USE: Primary | ICD-10-CM

## 2019-09-13 DIAGNOSIS — O28.8 AFI (AMNIOTIC FLUID INDEX) BORDERLINE LOW: ICD-10-CM

## 2019-09-13 DIAGNOSIS — O26.90 PREGNANCY RELATED CONDITION, ANTEPARTUM: ICD-10-CM

## 2019-09-13 PROCEDURE — 76805 OB US >/= 14 WKS SNGL FETUS: CPT

## 2019-09-13 NOTE — NURSING NOTE
See ultrasound report under imaging tab for further details of today's visit.       Channing Singh #[id number], MLC here with pt for her US.

## 2019-09-13 NOTE — PROGRESS NOTES
Please refer to ultrasound report under 'Imaging' Studies of 'Chart Review' tabs.    Ranjan Sheehan M.D.

## 2019-09-30 ENCOUNTER — HOSPITAL ENCOUNTER (OUTPATIENT)
Dept: ULTRASOUND IMAGING | Facility: CLINIC | Age: 39
Discharge: HOME | End: 2019-09-30
Attending: OBSTETRICS & GYNECOLOGY | Admitting: OBSTETRICS & GYNECOLOGY
Payer: COMMERCIAL

## 2019-09-30 ENCOUNTER — OFFICE VISIT (OUTPATIENT)
Dept: MATERNAL FETAL MEDICINE | Facility: CLINIC | Age: 39
End: 2019-09-30
Attending: OBSTETRICS & GYNECOLOGY
Payer: COMMERCIAL

## 2019-09-30 DIAGNOSIS — O24.112 TYPE 2 DIABETES MELLITUS COMPLICATING PREGNANCY, ANTEPARTUM, SECOND TRIMESTER: ICD-10-CM

## 2019-09-30 DIAGNOSIS — O35.9XX0 SUSPECTED FETAL ANOMALY, ANTEPARTUM, SINGLE OR UNSPECIFIED FETUS: ICD-10-CM

## 2019-09-30 DIAGNOSIS — O09.522 MULTIGRAVIDA OF ADVANCED MATERNAL AGE IN SECOND TRIMESTER: Primary | ICD-10-CM

## 2019-09-30 DIAGNOSIS — O26.90 PREGNANCY RELATED CONDITION, ANTEPARTUM: ICD-10-CM

## 2019-09-30 PROCEDURE — 76811 OB US DETAILED SNGL FETUS: CPT

## 2019-09-30 NOTE — PROGRESS NOTES
"Please see \"Imaging\" tab under \"Chart Review\" for details of today's US at the [facility].    Jah Carreon MD  Maternal-Fetal Medicine      "

## 2019-10-03 ENCOUNTER — PATIENT OUTREACH (OUTPATIENT)
Dept: EDUCATION SERVICES | Facility: CLINIC | Age: 39
End: 2019-10-03
Payer: COMMERCIAL

## 2019-10-03 NOTE — PROGRESS NOTES
Gestational Diabetes Follow-up    Subjective/Objective:    Yanet Reyes Avila sent in blood glucose log for review. Last date of communication was: 9/9/19.    Gestational diabetes is being managed with medications    Taking diabetes medications:   yes:     Diabetes Medication(s)     Biguanides       metFORMIN (GLUCOPHAGE) 500 MG tablet    Take 500 mg by mouth 2 times daily (with meals)    Insulin       insulin isophane human (HUMULIN N PEN) 100 UNIT/ML injection    Inject 42 units each night before bed.          Estimated Date of Delivery: Feb 25, 2020    BG/Food Log:   Blood Glucose/Ketone Log:    Date Fasting Post Breakfast Post Lunch Post Supper   9/27 88 101 111 105   9/28 91 88 101 96   9/29 85 94 101 97   9/30 89 97 88    10/1 93 98 100 98   10/2 87 97 100 111   10/3 97            Assessment:    Ketones: na.   Fasting blood glucoses: 14% in target.  After breakfast: 100% in target.  After lunch: 100% in target.  After dinner: 100% in target.    Plan/Response:  No insulin changes   Follow-up in 1 week.    Spoke with patient via  -verbalized understanding    CHE Bower CDE    Any diabetes medication dose changes were made via the CDE Protocol and Collaborative Practice Agreement with the patient's OB/GYN provider. A copy of this encounter was shared with the provider.

## 2019-10-07 ENCOUNTER — NURSE TRIAGE (OUTPATIENT)
Dept: NURSING | Facility: CLINIC | Age: 39
End: 2019-10-07

## 2019-10-07 ENCOUNTER — TELEPHONE (OUTPATIENT)
Dept: OBGYN | Facility: CLINIC | Age: 39
End: 2019-10-07

## 2019-10-07 NOTE — TELEPHONE ENCOUNTER
"    Caller  Is communicating through [place]  interpretor for  [facility]  Caller is a patient of  [facility]; reports she is pregnant, has DM and is feeling weak for several days with bG  In the   90\"s   Caller advised that  FNA chavez not  give triage  advise for this group and provided number to call  Interpretor states seh will continue to assist   Understands  And will comply   Deya Loya RN  FNA      Reason for Disposition    [1] MODERATE weakness (i.e., interferes with work, school, normal activities) AND [2] cause unknown  (Exceptions: weakness with acute minor illness, or weakness from poor fluid intake)    Protocols used: WEAKNESS (GENERALIZED) AND FATIGUE-A-AH      "

## 2019-10-08 ENCOUNTER — OFFICE VISIT (OUTPATIENT)
Dept: OBGYN | Facility: CLINIC | Age: 39
End: 2019-10-08
Attending: OBSTETRICS & GYNECOLOGY
Payer: COMMERCIAL

## 2019-10-08 ENCOUNTER — TELEPHONE (OUTPATIENT)
Dept: OBGYN | Facility: CLINIC | Age: 39
End: 2019-10-08

## 2019-10-08 VITALS
HEIGHT: 64 IN | WEIGHT: 178.8 LBS | HEART RATE: 89 BPM | BODY MASS INDEX: 30.52 KG/M2 | DIASTOLIC BLOOD PRESSURE: 81 MMHG | SYSTOLIC BLOOD PRESSURE: 120 MMHG

## 2019-10-08 DIAGNOSIS — J06.9 UPPER RESPIRATORY TRACT INFECTION, UNSPECIFIED TYPE: Primary | ICD-10-CM

## 2019-10-08 PROCEDURE — G0008 ADMIN INFLUENZA VIRUS VAC: HCPCS

## 2019-10-08 PROCEDURE — 90686 IIV4 VACC NO PRSV 0.5 ML IM: CPT | Mod: ZF

## 2019-10-08 PROCEDURE — G0463 HOSPITAL OUTPT CLINIC VISIT: HCPCS | Mod: 25

## 2019-10-08 PROCEDURE — 25000128 H RX IP 250 OP 636: Mod: ZF

## 2019-10-08 ASSESSMENT — MIFFLIN-ST. JEOR: SCORE: 1481.03

## 2019-10-08 NOTE — LETTER
"10/8/2019    RE: Yanet Reyes Avila  [address] [facility] [id number]     Dear Colleague,    Thank you for referring your patient, Yanet Reyes Avila, to the [facility] at [facility]. Please see a copy of my visit note below.    Evaluation of viral illness last week. Feels fatigue has lasted to this week.     She states she had fever and URI symptoms last week. She notes she feels better. Denies cough or recent travel. Would like a flu shot.    Denies n/v/diarrhea.     She denies abdominal pain.  She says BS are within goals. Using insulin.     /81   Pulse 89   Ht 1.626 m (5' 4\")   Wt 81.1 kg (178 lb 12.8 oz)   LMP 2019   BMI 30.69 kg/m     FHT: 155  FH=GA  No uterine tenderness    A/p: 40 yo  at 20 wks w/ preg c/b DM and possible PPROM (low ZENON) on previous scans. Here today for evaluation of URI.     1. Appears to be getting better. Denies symptoms c/w influenza.  Flu shot today.   2. Possible PPROM.  F/u for planned uls evaluation per MFM team  3. 2 wk f/u here for CHARLES.   4. Endocrine f/u Fri 10/11    Adenike Negrete MD, FACOG  [facility]  OB/GYN  10/8/2019  2:16 PM  "

## 2019-10-08 NOTE — NURSING NOTE
Clinic Administered Medication Documentation    MEDICATION LIST:   Injectable Medication Documentation    Patient was given influenza vaccine. Prior to medication administration, verified patients identity using patient s name and date of birth. Please see MAR and medication order for additional information. Patient instructed to report any adverse reaction to staff immediately .      Was entire vial of medication used? Yes  Vial/Syringe: Syringe  Expiration Date:  06/30/2020  Was this medication supplied by the patient? No

## 2019-10-08 NOTE — PROGRESS NOTES
"Evaluation of viral illness last week. Feels fatigue has lasted to this week.     She states she had fever and URI symptoms last week. She notes she feels better. Denies cough or recent travel. Would like a flu shot.    Denies n/v/diarrhea.     She denies abdominal pain.  She says BS are within goals. Using insulin.     /81   Pulse 89   Ht 1.626 m (5' 4\")   Wt 81.1 kg (178 lb 12.8 oz)   LMP 2019   BMI 30.69 kg/m    FHT: 155  FH=GA  No uterine tenderness    A/p: 40 yo  at 20 wks w/ preg c/b DM and possible PPROM (low ZENON) on previous scans. Here today for evaluation of URI.     1. Appears to be getting better. Denies symptoms c/w influenza.  Flu shot today.   2. Possible PPROM.  F/u for planned uls evaluation per MFM team  3. 2 wk f/u here for CHARLES.   4. Endocrine f/u Fri 10/11    Adenike Negrete MD, FACOG  [facility]  OB/GYN    10/8/2019  2:16 PM      "

## 2019-10-08 NOTE — TELEPHONE ENCOUNTER
Called to patient with  to coordinate visit today per Dr. Tompkins's recommendation. Offered visit with Dr. Negrete at 10:30 which patient accepted. , Monalisa, available.

## 2019-10-08 NOTE — TELEPHONE ENCOUNTER
Patient called noting generalized weakness and fatigue for the last several days.  Denies fever or chills, no sick contacts.  No pain or bleeding and has noted fetal movement.  Patient has type 2 DM glucose levels have been in the 90s, no lows or highs.    Advise patient be seen in clinic tomorrow, but if develops focal symptoms to present to the emergency department.  Judy Tompkins MD

## 2019-10-09 ENCOUNTER — PATIENT OUTREACH (OUTPATIENT)
Dept: EDUCATION SERVICES | Facility: CLINIC | Age: 39
End: 2019-10-09

## 2019-10-09 DIAGNOSIS — E11.9 TYPE 2 DIABETES MELLITUS WITHOUT COMPLICATION, UNSPECIFIED WHETHER LONG TERM INSULIN USE (H): ICD-10-CM

## 2019-10-11 ENCOUNTER — VIRTUAL VISIT (OUTPATIENT)
Dept: ENDOCRINOLOGY | Facility: CLINIC | Age: 39
End: 2019-10-11
Payer: COMMERCIAL

## 2019-10-11 DIAGNOSIS — Z33.1 PREGNANCY, INCIDENTAL: ICD-10-CM

## 2019-10-11 DIAGNOSIS — E11.8 TYPE 2 DIABETES MELLITUS WITH COMPLICATION (H): Primary | ICD-10-CM

## 2019-10-11 NOTE — PROGRESS NOTES
Endocrine Consult note    Attending Assessment/Plan :     Diabetes mellitus type 2.  The increase in NPH that occurred last night is fine.   SHe is having frequent contact with Annabelle Selby RD, LD, CDE and she has scheduled follow up with me as well.      Pregnancy, currently 20 weeks gestation. She is in process of changing her OB care .       Metabolic syndrome with high TG, low HDL, DM    Start time 0707  Stop time 0723  Total time 16 minutes    Lori Enamorado MD       Chief complaint/ HISTORY OF PRESENT ILLNESS  Marni presents for follow up of DM2 with pregnancy, metabolic syndrome. She was seen by me along with  today.   I have seen her once before on 9/10/19.  At that time I recommended she have weekly visits for the duration of the pregnancy, either with me, one of our clinic PAs or the CHE LEONE at [facility].  I appears she has only had a telephone visit with the RD on 10/3/19.      The record shows a progression from prediabetes in 2009 to diabetes in 2016.  In 10/17 the A1c got to 10%.   HgbA1c was 9.1% from 5/18.    That day she was noted to be pregnant and she was started on insulin.  6/21/18 she presented with miscarriage, after which she stopped the insuln.      NPH was re started 7/30/19 with escalating doses since then.  At our last visit she was on NPH  38 units and I increased the dose to 42 units.  She tells me she received phone call yesterday to increase to 46 units--the lst dose was taken last night    Current regimen:  NPH insulin 46 units 1030-11 PM--  Metformin 500 2 tablets ? Dose bid - ? 1000 bid     Recent BSs-- she Is checking before BF and 2 hour after meals  10/9/19 94 before 100 after, 97 after  19/819: 92 before, 104 after, 98 after, 122 after  10/7/19: 100 before, 89 after, 105 after, 113 after  10/6/19: 89 before, 91 after, 191, after, 118 after    Gets up 7 AM because she can't  Sleep; lst BS check of the day is usually whenever I wake up , today at 0700 was  "92  She is not having any hypoglycemia. She says her weight is up to 178    We have the following labs:   11/27/07: prolactin 17.2, TSH 1.7, insulin 21.4  5/5/09: insulin 19.4\, , HDL 21, cholesterol 184  10/28/09: prolactin 13.1, 25OHD 17.1, HgbA1c 5.9%  6/3//10: FSH 6  10/19/17: HgbA1c 10%  5/29/18: HgbA1c 9.1%  12/20/18: HgbA1c 7.8%, , HDL 27, cholesterol 178  6/21/19 HgbA1c 8.6% , glucose 269, creatinine 0.45, at diagnosis of pregnancy  9/19 HgbA1c 6%     REVIEW OF SYSTEMS  Can't sleep  Amniotic liquid small and placenta low.  Pregnancy at risk.  She is not happy with the care she had at [facility]. She is planning to change to [place].   Due 2/25/19; currently 20 weeks gestation    Past Medical History  Past Medical History:   Diagnosis Date     DM2 (diabetes mellitus, type 2) (H) 2016     High triglycerides     600+     HPV (human papilloma virus) infection 2019     Infertility, female 2009     Miscarriage 06/21/2018     Obesity 2012     Prediabetes 2009     Primary amenorrhea 2007     No past surgical history on file.    Medications    Current Outpatient Medications   Medication Sig Dispense Refill     aspirin (ASA) 81 MG chewable tablet Take 2 tablets (162 mg) by mouth daily 60 tablet 3     blood glucose (NO BRAND SPECIFIED) test strip Use to test blood sugar 4 times daily or as directed. 150 strip 11     Cholecalciferol (VITAMIN D) 2000 units CAPS Take 1 capsule by mouth daily Take one capsule daily. 90 capsule 3     docusate sodium (COLACE) 100 MG capsule Take 1 capsule (100 mg) by mouth 2 times daily (Patient not taking: Reported on 8/19/2019) 90 capsule 6     insulin isophane human (HUMULIN N PEN) 100 UNIT/ML injection Inject 46 units each night before bed. 10 mL 11     insulin pen needle (32G X 4 MM) 32G X 4 MM miscellaneous Use 1 pen needles daily as directed. 100 each 3     insulin syringe-needle U-100 (30G X 1/2\" 0.3 ML) 30G X 1/2\" 0.3 ML miscellaneous Use  syringes daily or as directed. " (Patient not taking: Reported on 8/19/2019) 100 each 3     metFORMIN (GLUCOPHAGE) 500 MG tablet Take 500 mg by mouth 2 times daily (with meals)       Prenatal Vit-Fe Fumarate-FA (PRENATAL MULTIVITAMIN W/IRON) 27-0.8 MG tablet Take 1 tablet by mouth daily       SENNA-docusate sodium (SENNA S) 8.6-50 MG tablet Take 1 tablet by mouth 2 times daily as needed (constipation) (Patient not taking: Reported on 8/19/2019) 30 tablet 3     vitamin B6 (PYRIDOXINE) 25 MG tablet Take 1 tablet (25 mg) by mouth every 8 hours (Patient not taking: Reported on 8/19/2019) 90 tablet 6     Social History  Social History     Tobacco Use     Smoking status: Never Smoker     Smokeless tobacco: Never Used   Substance Use Topics     Alcohol use: No     Drug use: No       Physical Exam  LMP 05/11/2019   There is no height or weight on file to calculate BMI.    DATA REVIEW    Results for REYES AVILA, YANET (MRN [id number]) as of 9/10/2019 21:24   Ref. Range 9/10/2019 00:00   Hemoglobin A1C Latest Ref Range: 4.3 - 6 % 6.0

## 2019-10-18 ENCOUNTER — PATIENT OUTREACH (OUTPATIENT)
Dept: EDUCATION SERVICES | Facility: CLINIC | Age: 39
End: 2019-10-18
Payer: COMMERCIAL

## 2019-10-18 DIAGNOSIS — E11.9 TYPE 2 DIABETES MELLITUS WITHOUT COMPLICATION, UNSPECIFIED WHETHER LONG TERM INSULIN USE (H): ICD-10-CM

## 2019-10-18 PROCEDURE — 99207 ZZC NO CHARGE LOS: CPT

## 2019-10-18 NOTE — PROGRESS NOTES
Gestational Diabetes Follow-up    Subjective/Objective:    Yanet Reyes Avila sent in blood glucose log for review. Last date of communication was: 10/9/19.    Gestational diabetes is being managed with medications    Taking diabetes medications:   yes:     Diabetes Medication(s)     Biguanides       metFORMIN (GLUCOPHAGE) 500 MG tablet    Take 500 mg by mouth 2 times daily (with meals)    Insulin       insulin isophane human (HUMULIN N PEN) 100 UNIT/ML injection    Inject 46 units each night before bed.          Estimated Date of Delivery: Feb 25, 2020    BG/Food Log:         Assessment:    Ketones: none noted  Fasting blood glucoses: 40% in target.  After breakfast: 100% in target.  After lunch: 100% in target.  After dinner: 100% in target.    Pt is wondering why she has to increase her insulin. Explained that most of her FBS's are > 95 in the morning slightly so she would benefit from increasing her insulin slightly as a result.  She reports some bumps and bruises when she gives the insulin. Is often using her legs or arms for injections. Explained that she could try her abdominal area on the sides to see if this helps.     Pt spoke to her endo on 10/11. She is asking when Valorie will be back. Explained that Valorie is gone this month but will be back in Nov. She would like to schedule a visit with Valorie so we arranged this today.     Plan/Response:  Recommend increase to insulin - NPH 0-0-0-46 --> 0-0-0-50   Follow up in 1 week 10/25/19 via e-mail.  Clinic follow up scheduled 11/4 at [facility] (with Valorie) per pt request.  Will route to endo and OB as FYI.    Spoke to pt via , pt verbalized understanding of increasing her insulin to 50 units per day and to follow up in 1 week via e-mail.     CHE Yanes CDE      Any diabetes medication dose changes were made via the CDE Protocol and Collaborative Practice Agreement with the patient's OB/GYN provider. A copy of this encounter was shared  with the provider.

## 2019-10-22 ENCOUNTER — PRENATAL OFFICE VISIT (OUTPATIENT)
Dept: OBGYN | Facility: CLINIC | Age: 39
End: 2019-10-22
Payer: COMMERCIAL

## 2019-10-22 VITALS
SYSTOLIC BLOOD PRESSURE: 108 MMHG | DIASTOLIC BLOOD PRESSURE: 64 MMHG | HEIGHT: 64 IN | WEIGHT: 181.6 LBS | BODY MASS INDEX: 31 KG/M2

## 2019-10-22 DIAGNOSIS — E11.9 CONTROLLED TYPE 2 DIABETES MELLITUS WITHOUT COMPLICATION, WITH LONG-TERM CURRENT USE OF INSULIN (H): ICD-10-CM

## 2019-10-22 DIAGNOSIS — O24.112 PREGNANCY WITH TYPE 2 DIABETES MELLITUS IN SECOND TRIMESTER: Primary | ICD-10-CM

## 2019-10-22 DIAGNOSIS — Z79.4 CONTROLLED TYPE 2 DIABETES MELLITUS WITHOUT COMPLICATION, WITH LONG-TERM CURRENT USE OF INSULIN (H): ICD-10-CM

## 2019-10-22 PROCEDURE — 99207 ZZC PRENATAL VISIT: CPT | Performed by: OBSTETRICS & GYNECOLOGY

## 2019-10-22 RX ORDER — SWAB
2 SWAB, NON-MEDICATED MISCELLANEOUS DAILY
Qty: 90 CAPSULE | Refills: 3 | Status: SHIPPED | OUTPATIENT
Start: 2019-10-22

## 2019-10-22 RX ORDER — PRENATAL VIT/IRON FUM/FOLIC AC 27MG-0.8MG
1 TABLET ORAL DAILY
Qty: 90 TABLET | Refills: 3 | Status: SHIPPED | OUTPATIENT
Start: 2019-10-22 | End: 2020-02-20

## 2019-10-22 ASSESSMENT — ANXIETY QUESTIONNAIRES
6. BECOMING EASILY ANNOYED OR IRRITABLE: NOT AT ALL
3. WORRYING TOO MUCH ABOUT DIFFERENT THINGS: NOT AT ALL
7. FEELING AFRAID AS IF SOMETHING AWFUL MIGHT HAPPEN: NOT AT ALL
5. BEING SO RESTLESS THAT IT IS HARD TO SIT STILL: NOT AT ALL
IF YOU CHECKED OFF ANY PROBLEMS ON THIS QUESTIONNAIRE, HOW DIFFICULT HAVE THESE PROBLEMS MADE IT FOR YOU TO DO YOUR WORK, TAKE CARE OF THINGS AT HOME, OR GET ALONG WITH OTHER PEOPLE: NOT DIFFICULT AT ALL
2. NOT BEING ABLE TO STOP OR CONTROL WORRYING: NOT AT ALL
1. FEELING NERVOUS, ANXIOUS, OR ON EDGE: NOT AT ALL
GAD7 TOTAL SCORE: 0

## 2019-10-22 ASSESSMENT — MIFFLIN-ST. JEOR: SCORE: 1493.73

## 2019-10-22 ASSESSMENT — PATIENT HEALTH QUESTIONNAIRE - PHQ9
5. POOR APPETITE OR OVEREATING: NOT AT ALL
SUM OF ALL RESPONSES TO PHQ QUESTIONS 1-9: 1

## 2019-10-22 NOTE — PROGRESS NOTES
Prenatal Visit:   Marni was seen today with the aid of an in person . She is choosing to deliver at [facility] as she lives in [place].  Her pregnancy is significant for advanced maternal age, Type 2 DM controlled with metoformin and Lantus prior to pregnancy and now managed with NPH 46 units at bedtime recently increased to 50 units at bedtime and Metformin 500mg BID due to her fasting blood sugars being only 40% in target. She is set to see an Endocrinologist for co-management. Her last hemoglobin A1C was 6.0 in September.  Her pregnancy is also significant for an anterior placenta previa and a borderline normal ZENON with an MVP of 2.9 at the time of her last ultrasound.   She is taking 81mg of ASA ( not 162mg) daily.  She would like a refill of her Vitamin D3 and Prenatal Vitamins today.    I counseled her about the need to continue to have close follow up with MFM for the remainder of her pregnancy and encouraged her to change her appointment from [place] to [facility].  She was encouraged to keep her Endocrinology visits as well.  I reiterated the need for pelvic rest while she still has the diagnosis of a placenta previa.    RTC in 2 weeks for continued prenatal care.    Luciana Davis MD

## 2019-10-23 ASSESSMENT — ANXIETY QUESTIONNAIRES: GAD7 TOTAL SCORE: 0

## 2019-10-28 ENCOUNTER — DOCUMENTATION ONLY (OUTPATIENT)
Dept: CARE COORDINATION | Facility: CLINIC | Age: 39
End: 2019-10-28

## 2019-10-28 ENCOUNTER — HOSPITAL ENCOUNTER (OUTPATIENT)
Dept: LAB | Facility: CLINIC | Age: 39
End: 2019-10-28
Attending: OBSTETRICS & GYNECOLOGY
Payer: COMMERCIAL

## 2019-10-28 ENCOUNTER — HOSPITAL ENCOUNTER (OUTPATIENT)
Dept: ULTRASOUND IMAGING | Facility: CLINIC | Age: 39
Discharge: HOME | End: 2019-10-28
Attending: OBSTETRICS & GYNECOLOGY | Admitting: OBSTETRICS & GYNECOLOGY
Payer: COMMERCIAL

## 2019-10-28 ENCOUNTER — OFFICE VISIT (OUTPATIENT)
Dept: MATERNAL FETAL MEDICINE | Facility: CLINIC | Age: 39
End: 2019-10-28
Attending: OBSTETRICS & GYNECOLOGY
Payer: COMMERCIAL

## 2019-10-28 DIAGNOSIS — O36.5990 PREGNANCY AFFECTED BY FETAL GROWTH RESTRICTION: ICD-10-CM

## 2019-10-28 DIAGNOSIS — O41.02X0 ANHYDRAMNIOS IN SECOND TRIMESTER, SINGLE OR UNSPECIFIED FETUS: ICD-10-CM

## 2019-10-28 DIAGNOSIS — O44.02 PLACENTA PREVIA IN SECOND TRIMESTER: ICD-10-CM

## 2019-10-28 DIAGNOSIS — O24.112 TYPE 2 DIABETES MELLITUS COMPLICATING PREGNANCY, ANTEPARTUM, SECOND TRIMESTER: ICD-10-CM

## 2019-10-28 DIAGNOSIS — O09.522 MULTIGRAVIDA OF ADVANCED MATERNAL AGE IN SECOND TRIMESTER: Primary | ICD-10-CM

## 2019-10-28 DIAGNOSIS — O35.9XX0 SUSPECTED FETAL ANOMALY, ANTEPARTUM, SINGLE OR UNSPECIFIED FETUS: ICD-10-CM

## 2019-10-28 LAB — RUPTURE OF FETAL MEMBRANES BY ROM PLUS: NEGATIVE

## 2019-10-28 PROCEDURE — 86645 CMV ANTIBODY IGM: CPT | Performed by: OBSTETRICS & GYNECOLOGY

## 2019-10-28 PROCEDURE — 86778 TOXOPLASMA ANTIBODY IGM: CPT | Performed by: OBSTETRICS & GYNECOLOGY

## 2019-10-28 PROCEDURE — 85613 RUSSELL VIPER VENOM DILUTED: CPT | Performed by: OBSTETRICS & GYNECOLOGY

## 2019-10-28 PROCEDURE — 86147 CARDIOLIPIN ANTIBODY EA IG: CPT | Performed by: OBSTETRICS & GYNECOLOGY

## 2019-10-28 PROCEDURE — 82105 ALPHA-FETOPROTEIN SERUM: CPT | Performed by: OBSTETRICS & GYNECOLOGY

## 2019-10-28 PROCEDURE — 85730 THROMBOPLASTIN TIME PARTIAL: CPT | Performed by: OBSTETRICS & GYNECOLOGY

## 2019-10-28 PROCEDURE — 86146 BETA-2 GLYCOPROTEIN ANTIBODY: CPT | Performed by: OBSTETRICS & GYNECOLOGY

## 2019-10-28 PROCEDURE — 76820 UMBILICAL ARTERY ECHO: CPT | Performed by: OBSTETRICS & GYNECOLOGY

## 2019-10-28 PROCEDURE — 84112 EVAL AMNIOTIC FLUID PROTEIN: CPT | Performed by: OBSTETRICS & GYNECOLOGY

## 2019-10-28 PROCEDURE — 86644 CMV ANTIBODY: CPT | Performed by: OBSTETRICS & GYNECOLOGY

## 2019-10-28 PROCEDURE — 00000401 ZZHCL STATISTIC THROMBIN TIME NC: Performed by: OBSTETRICS & GYNECOLOGY

## 2019-10-28 PROCEDURE — 86777 TOXOPLASMA ANTIBODY: CPT | Performed by: OBSTETRICS & GYNECOLOGY

## 2019-10-28 PROCEDURE — 76816 OB US FOLLOW-UP PER FETUS: CPT

## 2019-10-28 PROCEDURE — 36415 COLL VENOUS BLD VENIPUNCTURE: CPT | Performed by: OBSTETRICS & GYNECOLOGY

## 2019-10-28 PROCEDURE — 00000167 ZZHCL STATISTIC INR NC: Performed by: OBSTETRICS & GYNECOLOGY

## 2019-10-28 NOTE — NURSING NOTE
Present for [facility] office visit:  Name: Tami Aguilera  ID #: [id number]  Agency Name: [facility]

## 2019-10-28 NOTE — NURSING NOTE
Patient presented to [facility] for F/U US. Denies LOF, vaginal bleeding or cramping. Dr. Ohara spoke with patient in regards to US results. Speculum performed - Rom plus collected and spent to lab (see results). Dr. Ohara offered admission to the hospital for further consultation with NICU but patient deliced. Patient will be scheduled twice weekly for Limited/UAR and F/U-UAR in 2 weeks. Plan for NICU consult on next consult day. Patient understands we will work on scheduling that and give her further details at her appointment on Thursday (10/31/19). Patient sent to lab with .

## 2019-10-28 NOTE — PROGRESS NOTES
Please see ultrasound report under imaging tab for details on ultrasound performed today.    Laura Ohara MD  , OB/GYN  Maternal-Fetal Medicine  [email or web address]  [phone] (Academic office)  [phone] (Pager)

## 2019-10-29 LAB
B2 GLYCOPROT1 IGG SERPL IA-ACNC: <0.6 U/ML
B2 GLYCOPROT1 IGM SERPL IA-ACNC: <2.9 U/ML
CARDIOLIPIN ANTIBODY IGG: <1.6 GPL-U/ML (ref 0–19.9)
CARDIOLIPIN ANTIBODY IGM: <0.2 MPL-U/ML (ref 0–19.9)
CMV IGG SERPL QL IA: >8 AI (ref 0–0.8)
CMV IGM SERPL QL IA: <0.2 AI (ref 0–0.8)

## 2019-10-30 ENCOUNTER — PATIENT OUTREACH (OUTPATIENT)
Dept: EDUCATION SERVICES | Facility: CLINIC | Age: 39
End: 2019-10-30
Payer: COMMERCIAL

## 2019-10-30 DIAGNOSIS — E11.9 TYPE 2 DIABETES MELLITUS WITHOUT COMPLICATION, UNSPECIFIED WHETHER LONG TERM INSULIN USE (H): ICD-10-CM

## 2019-10-30 LAB
T GONDII IGG SER-ACNC: <3 IU/ML
T GONDII IGM SER-ACNC: <3 AU/ML

## 2019-10-30 NOTE — PROGRESS NOTES
Gestational Diabetes Follow-up    Subjective/Objective:    Yanet Reyes Avila sent in blood glucose log for review. Last date of communication was: 10/18/19.    Gestational diabetes is being managed with medications    Taking diabetes medications:   yes:     Diabetes Medication(s)     Biguanides       metFORMIN (GLUCOPHAGE) 500 MG tablet    Take 500 mg by mouth 2 times daily (with meals)    Insulin       insulin isophane human (HUMULIN N PEN) 100 UNIT/ML injection    Inject 50 units each night before bed.          Estimated Date of Delivery: Feb 25, 2020    BG/Food Log:         Assessment:    Ketones: na.   Fasting blood glucoses: 66% in target.  After breakfast: 100% in target.  After lunch: 100% in target.  After dinner: 100% in target.    Plan/Response:  Recommend increase to insulin - NPH 0-0-0-50 --> 0-0-0-55    Spoke with patient via . Patient had to cancel her clinic visit with CDE for 11/4, and was ok with continuing phone/email follow up for now. Knows to increase to 55 units NPH with email follow up on 11/4.        CHE Bower CDE    Any diabetes medication dose changes were made via the CDE Protocol and Collaborative Practice Agreement with the patient's OB/GYN provider. A copy of this encounter was shared with the provider.

## 2019-10-31 ENCOUNTER — OFFICE VISIT (OUTPATIENT)
Dept: MATERNAL FETAL MEDICINE | Facility: CLINIC | Age: 39
End: 2019-10-31
Attending: OBSTETRICS & GYNECOLOGY
Payer: COMMERCIAL

## 2019-10-31 ENCOUNTER — HOSPITAL ENCOUNTER (OUTPATIENT)
Dept: ULTRASOUND IMAGING | Facility: CLINIC | Age: 39
Discharge: HOME | End: 2019-10-31
Attending: OBSTETRICS & GYNECOLOGY | Admitting: OBSTETRICS & GYNECOLOGY
Payer: COMMERCIAL

## 2019-10-31 DIAGNOSIS — O24.112 TYPE 2 DIABETES MELLITUS COMPLICATING PREGNANCY, ANTEPARTUM, SECOND TRIMESTER: ICD-10-CM

## 2019-10-31 DIAGNOSIS — O36.5990 PREGNANCY AFFECTED BY FETAL GROWTH RESTRICTION: ICD-10-CM

## 2019-10-31 DIAGNOSIS — O09.522 MULTIGRAVIDA OF ADVANCED MATERNAL AGE IN SECOND TRIMESTER: ICD-10-CM

## 2019-10-31 DIAGNOSIS — O36.5990 PREGNANCY AFFECTED BY FETAL GROWTH RESTRICTION: Primary | ICD-10-CM

## 2019-10-31 LAB
# FETUSES US: NORMAL
# FETUSES: 1
AFP ADJ MOM AMN: 4.28
AFP SERPL-MCNC: 271 NG/ML
AGE - REPORTED: 37.8 YR
CURRENT SMOKER: NO
CURRENT SMOKER: NO
DIABETES STATUS PATIENT: YES
FAMILY MEMBER DISEASES HX: NO
FAMILY MEMBER DISEASES HX: NO
GA METHOD: NORMAL
GA METHOD: NORMAL
GA: NORMAL WK
IDDM PATIENT QL: YES
INTEGRATED SCN PATIENT-IMP: NORMAL
LA PPP-IMP: NEGATIVE
LMP START DATE: NORMAL
MONOCHORIONIC TWINS: NO
SERVICE CMNT-IMP: NO
SPECIMEN DRAWN SERPL: NORMAL
VALPROIC/CARBAMAZEPINE STATUS: NO
WEIGHT UNITS: NORMAL

## 2019-10-31 PROCEDURE — 76815 OB US LIMITED FETUS(S): CPT

## 2019-10-31 PROCEDURE — 76820 UMBILICAL ARTERY ECHO: CPT

## 2019-10-31 NOTE — NURSING NOTE
Present for MFM office visit:  Name: Allison  ID #: [id number]  [place] [facility]    Patient agrees to NICU/SW consult. Scheduled on 11/11/19 at 1000 with US at 0845. Patient was given map and discussed through .

## 2019-10-31 NOTE — PROGRESS NOTES
Please see full imaging report from [facility] program under imaging tab.    Findings are reviewed at length today with Marni, with the assistance of an . She appears to understand the significance of the findings, and our concerns that there appears to be something seriously abnormal about the fetal status, and there is a high risk of IUFD based on current findings. We reviewed the labs that are back since her last visit - remote evidence of CMV infection (IgG+/IgM-), no evidence of toxoplasmosis infection (IgG-/IgM-), and a negative antibody evaluation for antiphospholipid antibody syndrome. msAFP is still pending and NIPT is low risk of fetal aneuploidy.     At this time, given her gestational age, we again reviewed the option of meeting with our NICU staff soon, to discuss when she would like intervention for fetal distress, and additional imaging and follow up. She will continue to see us twice weekly. She is aware of the high risk of IUFD. She declines NICU consult at this time, and would prefer to wait until her scheduled visit at the [facility] on 11/11/19.     I did not review her diabetes control with her today, due to limited time with  and the need to address the above concerns as a priority today.     Silvia Talley MD  Maternal Fetal Medicine

## 2019-11-04 ENCOUNTER — OFFICE VISIT (OUTPATIENT)
Dept: MATERNAL FETAL MEDICINE | Facility: CLINIC | Age: 39
End: 2019-11-04
Attending: OBSTETRICS & GYNECOLOGY
Payer: COMMERCIAL

## 2019-11-04 ENCOUNTER — HOSPITAL ENCOUNTER (OUTPATIENT)
Dept: ULTRASOUND IMAGING | Facility: CLINIC | Age: 39
Discharge: HOME | End: 2019-11-04
Attending: OBSTETRICS & GYNECOLOGY | Admitting: OBSTETRICS & GYNECOLOGY
Payer: COMMERCIAL

## 2019-11-04 DIAGNOSIS — O41.02X0 ANHYDRAMNIOS IN SECOND TRIMESTER, SINGLE OR UNSPECIFIED FETUS: ICD-10-CM

## 2019-11-04 DIAGNOSIS — O36.5990 PREGNANCY AFFECTED BY FETAL GROWTH RESTRICTION: ICD-10-CM

## 2019-11-04 DIAGNOSIS — O24.112 TYPE 2 DIABETES MELLITUS COMPLICATING PREGNANCY, ANTEPARTUM, SECOND TRIMESTER: ICD-10-CM

## 2019-11-04 DIAGNOSIS — O09.522 MULTIGRAVIDA OF ADVANCED MATERNAL AGE IN SECOND TRIMESTER: ICD-10-CM

## 2019-11-04 DIAGNOSIS — O36.5920 INTRAUTERINE GROWTH RESTRICTION (IUGR) AFFECTING CARE OF MOTHER, SECOND TRIMESTER, SINGLE OR UNSPECIFIED FETUS: Primary | ICD-10-CM

## 2019-11-04 PROCEDURE — 76820 UMBILICAL ARTERY ECHO: CPT | Performed by: OBSTETRICS & GYNECOLOGY

## 2019-11-04 PROCEDURE — 76815 OB US LIMITED FETUS(S): CPT

## 2019-11-04 NOTE — NURSING NOTE
here at bedside for MFM Clinic visit on 11/4/19...    Name: Efrain Parham  ID# [id number]  Agency Name: [facility]

## 2019-11-04 NOTE — PROGRESS NOTES
Please see full imaging report from [facility] program under imaging tab.    Findings reviewed with Marni, and her sister and friend today, with the assistance of an . We again reviewed our concerns about a high risk of fetal demise, but without a clear recommendation for delivery at this time, based on the periviable gestational age and the identified fetal growth restriction.     At this time she declines to meet with NICU faculty to discuss intervention but prefers to speak with them when scheduled on 11/11/19 at [facility]. She is aware of the high risk of stillbirth, and does wish to attempt to prolong pregnancy as possible. She was previously ruled out for PPROM, and declines any leaking of fluid again today. She denies bleeding or contractions. She reports fetal movement as present.     She will return again to see us later in the week. She is aware that if there is a dramatic change in the UAR, we may recommend that she consider hospitalization and NICU consult earlier than 11/11, to determine if she would like steroid administration and delivery if fetal status is worsening.     Silvia Talley MD  Maternal Fetal Medicine

## 2019-11-08 ENCOUNTER — OFFICE VISIT (OUTPATIENT)
Dept: INTERPRETER SERVICES | Facility: CLINIC | Age: 39
End: 2019-11-08
Payer: COMMERCIAL

## 2019-11-08 ENCOUNTER — OFFICE VISIT (OUTPATIENT)
Dept: MATERNAL FETAL MEDICINE | Facility: CLINIC | Age: 39
End: 2019-11-08
Attending: OBSTETRICS & GYNECOLOGY
Payer: COMMERCIAL

## 2019-11-08 ENCOUNTER — HOSPITAL ENCOUNTER (OUTPATIENT)
Dept: ULTRASOUND IMAGING | Facility: CLINIC | Age: 39
Discharge: HOME | End: 2019-11-08
Attending: OBSTETRICS & GYNECOLOGY | Admitting: OBSTETRICS & GYNECOLOGY
Payer: COMMERCIAL

## 2019-11-08 ENCOUNTER — VIRTUAL VISIT (OUTPATIENT)
Dept: ENDOCRINOLOGY | Facility: CLINIC | Age: 39
End: 2019-11-08
Payer: COMMERCIAL

## 2019-11-08 DIAGNOSIS — O36.5990 PREGNANCY AFFECTED BY FETAL GROWTH RESTRICTION: ICD-10-CM

## 2019-11-08 DIAGNOSIS — E11.8 TYPE 2 DIABETES MELLITUS WITH COMPLICATION (H): Primary | ICD-10-CM

## 2019-11-08 DIAGNOSIS — O24.112 TYPE 2 DIABETES MELLITUS COMPLICATING PREGNANCY, ANTEPARTUM, SECOND TRIMESTER: ICD-10-CM

## 2019-11-08 DIAGNOSIS — O36.5920 INTRAUTERINE GROWTH RESTRICTION (IUGR) AFFECTING CARE OF MOTHER, SECOND TRIMESTER, SINGLE OR UNSPECIFIED FETUS: Primary | ICD-10-CM

## 2019-11-08 DIAGNOSIS — O09.522 MULTIGRAVIDA OF ADVANCED MATERNAL AGE IN SECOND TRIMESTER: ICD-10-CM

## 2019-11-08 DIAGNOSIS — O24.111 PREGNANCY COMPLICATED BY PRE-EXISTING TYPE 2 DIABETES IN FIRST TRIMESTER: ICD-10-CM

## 2019-11-08 DIAGNOSIS — O41.02X0 ANHYDRAMNIOS IN SECOND TRIMESTER, SINGLE OR UNSPECIFIED FETUS: ICD-10-CM

## 2019-11-08 PROCEDURE — T1013 SIGN LANG/ORAL INTERPRETER: HCPCS | Mod: U3,ZF

## 2019-11-08 PROCEDURE — 76815 OB US LIMITED FETUS(S): CPT

## 2019-11-08 PROCEDURE — 76820 UMBILICAL ARTERY ECHO: CPT | Performed by: OBSTETRICS & GYNECOLOGY

## 2019-11-08 NOTE — PROGRESS NOTES
"Endocrine Consult note    Attending Assessment/Plan :     Diabetes mellitus type 2.  I am not convinced I have complete and accurate information at present.  Although the fasting AM BSs she described are above target, she also stated that BS in the AM is usually \"Low\" .  When we were trying to resolve this reporting discrepancy she said she had to get going to her next appt and so we couldn't resolve it. :She says she will get us her BSs later today.     Pregnancy, currently 24 weeks gestation.    The pregnancy has been complicated by anhydramnios    Metabolic syndrome with high TG, low HDL, DM    Start time 0704 AM  Stop time 0720-- she had to go to get to OB appointment so appt with me had to be terminated before we had resolved the issues  Total time  16 minutes    Lori Enamorado MD       Chief complaint/ HISTORY OF PRESENT ILLNESS  Marni presents for follow up of DM2 with pregnancy, metabolic syndrome. She was seen by me along with  today. I last saw her via telephone visit 10/11/19. Recent fetal US showed absent amniotic fluid volume/ anhydramnios, breech presentation with anterior placenta previa, high risk for fetal demise.      Prediabetes in 2009 progressed to diabetes in 2016.  In 10/17 the A1c got to 10%.   HgbA1c was 9.1% from 5/18.    That day she was noted to be pregnant and she was started on insulin.  6/21/18 she presented with miscarriage, after which she stopped the insuln.      NPH was re started 7/30/19 with escalating doses since then.  At our last visit she was on NPH  46 units . The dose was increased to 55 units on 10/30/19 by Eugenia Zurita RD, LD CDE.  Jennifer she says that since this, she feels some changes.  She can     Current regimen:  NPH insulin 55 units 1030-11 PM--  Metformin 500 2 tablets ? Dose bid - ? 1000 bid     Recent BSs-- she Is checking before BF and 2 hour after meals  10/27/19: 97, 101, 107, 118  10/28/19: 97, 101, 110, 102  10/29/19: 94, 101, 107, " 117  We do not have download of her BS meter or record from her book.  INstead, she read me the following  11/6/19: 93 at 0800, 97 at 1030, 91 at 2 PM, 90 at 9 PM   Yesterday: 98 at 7 AM, 101, 94 at 230, 9 PM 96  Today: 100 at 0630 before eating     She is not having any hypoglycemia. Her weight I still 178    We have the following labs:   11/27/07: prolactin 17.2, TSH 1.7, insulin 21.4  5/5/09: insulin 19.4\, , HDL 21, cholesterol 184  10/28/09: prolactin 13.1, 25OHD 17.1, HgbA1c 5.9%  6/3//10: FSH 6  10/19/17: HgbA1c 10%  5/29/18: HgbA1c 9.1%  12/20/18: HgbA1c 7.8%, , HDL 27, cholesterol 178  6/21/19 HgbA1c 8.6% , glucose 269, creatinine 0.45, at diagnosis of pregnancy  9/19 HgbA1c 6%     REVIEW OF SYSTEMS  Everything is the same  Getting US today at 8 because baby       Past Medical History  Past Medical History:   Diagnosis Date     DM2 (diabetes mellitus, type 2) (H) 2016     High triglycerides     600+     HPV (human papilloma virus) infection 2019     Infertility, female 2009     Miscarriage 06/21/2018     Obesity 2012     Prediabetes 2009     Primary amenorrhea 2007     No past surgical history on file.    Medications    Current Outpatient Medications   Medication Sig Dispense Refill     aspirin (ASA) 81 MG chewable tablet Take 2 tablets (162 mg) by mouth daily 60 tablet 3     blood glucose (NO BRAND SPECIFIED) test strip Use to test blood sugar 4 times daily or as directed. 150 strip 11     Cholecalciferol (VITAMIN D) 2000 units CAPS Take 1 capsule by mouth daily Take one capsule daily. 90 capsule 3     docusate sodium (COLACE) 100 MG capsule Take 1 capsule (100 mg) by mouth 2 times daily (Patient not taking: Reported on 8/19/2019) 90 capsule 6     insulin isophane human (HUMULIN N PEN) 100 UNIT/ML injection Inject 55 units each night before bed. 10 mL 11     insulin pen needle (32G X 4 MM) 32G X 4 MM miscellaneous Use 1 pen needles daily as directed. 100 each 3     insulin syringe-needle U-100  "(30G X 1/2\" 0.3 ML) 30G X 1/2\" 0.3 ML miscellaneous Use  syringes daily or as directed. (Patient not taking: Reported on 2019) 100 each 3     metFORMIN (GLUCOPHAGE) 500 MG tablet Take 500 mg by mouth 2 times daily (with meals)       Prenatal Vit-Fe Fumarate-FA (PRENATAL MULTIVITAMIN W/IRON) 27-0.8 MG tablet Take 1 tablet by mouth daily 90 tablet 3     SENNA-docusate sodium (SENNA S) 8.6-50 MG tablet Take 1 tablet by mouth 2 times daily as needed (constipation) (Patient not taking: Reported on 2019) 30 tablet 3     vitamin B6 (PYRIDOXINE) 25 MG tablet Take 1 tablet (25 mg) by mouth every 8 hours (Patient not taking: Reported on 2019) 90 tablet 6     vitamin D3 (CHOLECALCIFEROL) 400 units capsule Take 2 capsules (800 Units) by mouth daily 90 capsule 3     Social History  Social History     Tobacco Use     Smoking status: Never Smoker     Smokeless tobacco: Never Used   Substance Use Topics     Alcohol use: No     Drug use: No       Physical Exam  LMP 2019   There is no height or weight on file to calculate BMI.                                                                                                             Limited  ---------------------------------------------------------------------------------------------------------  Pat. Name:        REYES AVILA, YANET                                                                                Study Date:                 2019 2:26pm  Pat. NO:             [id number]                                                                                                 Referring  MD:     LINK CHAVEZ  Site:                   [facility]                                                                                        Sonographer:     Ca Murray RDMS  :                  1980                                                                                                   Age:                                " 39  ---------------------------------------------------------------------------------------------------------     INDICATION  ---------------------------------------------------------------------------------------------------------  Anhydramnios. Fetal Growth Restriction. Elevated S/D ratios. New result of ABNORMAL msAFP 2.38 MoM.        PREGNANCY  ---------------------------------------------------------------------------------------------------------  Corona pregnancy. Number of fetuses: 1        DATING  ---------------------------------------------------------------------------------------------------------                                           Date                                Details                                                                                      Gest. age                      LARS  LMP                                  5/11/2019                                                                                                                         25 w + 2 d                     2/15/2020  Prior assessment               7/5/2019                          GA: 6 w + 3 d                                                                             23 w + 6 d                     2/25/2020  Assigned dating                  Dating performed on 10/28/2019, based on the prior assessment (on 07/5/2019)                    23 w + 6 d                     2/25/2020        GENERAL EVALUATION  ---------------------------------------------------------------------------------------------------------  Cardiac activity present.  bpm.  Fetal movements Minimal.  Presentation breech.  Placenta Complete anterior placenta previa.  Umbilical cord previously studied.  Amniotic fluid Anhydramnios.        FETAL DOPPLER  ---------------------------------------------------------------------------------------------------------  Umbilical Artery:  S / D                                       7.01                                                    >99%        Hema  HR                                          159                     bpm     Impression: Two S/D values are elevated today (7.01, 6.39) and the S/D assessment of the other umbilical artery are lower (4.9, 4.56)        RECOMMENDATION  ---------------------------------------------------------------------------------------------------------  Findings reviewed with Marni, and her sister and friend today, with the assistance of an . We again reviewed our concerns about a high risk of fetal demise, but  without a clear recommendation for delivery at this time, based on the periviable gestational age and the identified fetal growth restriction.     At this time she declines to meet with NICU faculty to discuss intervention but prefers to speak with them when scheduled on 11/11/19 at [facility]. She is aware of  the high risk of stillbirth, and does wish to attempt to prolong pregnancy as possible. She was previously ruled out for PPROM, and declines any leaking of fluid again  today. She denies bleeding or contractions. She reports fetal movement as present.     She will return again to see us later in the week. She is aware that if there is a dramatic change in the UAR, we may recommend that she consider hospitalization and  NICU consult earlier than 11/11, to determine if she would like steroid administration and delivery if fetal status is worsening.                                                                         IMPRESSION  ---------------------------------------------------------------------------------------------------------  1) Corona intrauterine pregnancy at 23 weeks 6 days gestational age.  2) The amniotic fluid volume is again severely reduced, with suspected anhydramnios  3) Breech presentation  4) Intermittently abnormal UAR

## 2019-11-08 NOTE — PROGRESS NOTES
"Please see \"Imaging\" tab under \"Chart Review\" for details of today's US at the [facility].    Jah Carreon MD  Maternal-Fetal Medicine    "

## 2019-11-11 ENCOUNTER — PATIENT OUTREACH (OUTPATIENT)
Dept: EDUCATION SERVICES | Facility: CLINIC | Age: 39
End: 2019-11-11

## 2019-11-11 ENCOUNTER — OFFICE VISIT (OUTPATIENT)
Dept: MATERNAL FETAL MEDICINE | Facility: CLINIC | Age: 39
End: 2019-11-11
Attending: OBSTETRICS & GYNECOLOGY
Payer: COMMERCIAL

## 2019-11-11 ENCOUNTER — OFFICE VISIT (OUTPATIENT)
Dept: INTERPRETER SERVICES | Facility: CLINIC | Age: 39
End: 2019-11-11

## 2019-11-11 ENCOUNTER — OFFICE VISIT (OUTPATIENT)
Dept: CARE COORDINATION | Facility: CLINIC | Age: 39
End: 2019-11-11

## 2019-11-11 ENCOUNTER — HOSPITAL ENCOUNTER (OUTPATIENT)
Dept: ULTRASOUND IMAGING | Facility: CLINIC | Age: 39
Discharge: HOME | End: 2019-11-11
Attending: OBSTETRICS & GYNECOLOGY | Admitting: PEDIATRICS
Payer: COMMERCIAL

## 2019-11-11 VITALS
HEART RATE: 93 BPM | SYSTOLIC BLOOD PRESSURE: 122 MMHG | RESPIRATION RATE: 20 BRPM | BODY MASS INDEX: 32.13 KG/M2 | DIASTOLIC BLOOD PRESSURE: 79 MMHG | OXYGEN SATURATION: 99 % | WEIGHT: 187.2 LBS

## 2019-11-11 DIAGNOSIS — O36.5990 PREGNANCY AFFECTED BY FETAL GROWTH RESTRICTION: ICD-10-CM

## 2019-11-11 DIAGNOSIS — O09.522 MULTIGRAVIDA OF ADVANCED MATERNAL AGE IN SECOND TRIMESTER: ICD-10-CM

## 2019-11-11 DIAGNOSIS — E11.9 TYPE 2 DIABETES MELLITUS WITHOUT COMPLICATION, UNSPECIFIED WHETHER LONG TERM INSULIN USE (H): Primary | ICD-10-CM

## 2019-11-11 DIAGNOSIS — O24.112 TYPE 2 DIABETES MELLITUS COMPLICATING PREGNANCY, ANTEPARTUM, SECOND TRIMESTER: ICD-10-CM

## 2019-11-11 DIAGNOSIS — E11.8 TYPE 2 DIABETES MELLITUS WITH COMPLICATION (H): ICD-10-CM

## 2019-11-11 DIAGNOSIS — Z71.9 ENCOUNTER FOR COUNSELING: Primary | ICD-10-CM

## 2019-11-11 DIAGNOSIS — O36.5920 INTRAUTERINE GROWTH RESTRICTION (IUGR) AFFECTING CARE OF MOTHER, SECOND TRIMESTER, SINGLE OR UNSPECIFIED FETUS: Primary | ICD-10-CM

## 2019-11-11 PROCEDURE — 76820 UMBILICAL ARTERY ECHO: CPT | Performed by: OBSTETRICS & GYNECOLOGY

## 2019-11-11 PROCEDURE — G0463 HOSPITAL OUTPT CLINIC VISIT: HCPCS | Mod: 25,ZF

## 2019-11-11 PROCEDURE — 76816 OB US FOLLOW-UP PER FETUS: CPT

## 2019-11-11 PROCEDURE — T1013 SIGN LANG/ORAL INTERPRETER: HCPCS | Mod: U3,ZF

## 2019-11-11 ASSESSMENT — PAIN SCALES - GENERAL: PAINLEVEL: NO PAIN (0)

## 2019-11-11 NOTE — NURSING NOTE
Marni here for 1st obv and f/u comp, NiCU/SW consult due to preg c/b anhydramnios. Patient reports +FM, denies ctx, denies SROM, and denies vag bleeding.  Marni had  Gloria ID # [id number] from  Agency present for her entire visit. Patient was given new OB folder in Ecuadorean with maps and phone numbers and prenatal education. Dr. Pruitt in to talk with pt and review U/s results. Patient also met with Anabel from  and Dr. Bowden from the NICU. Patient scheduled apts for 2x weekly U/S and growth and obv in 2 weeks for the next month. Patient made appointments. I called to [facility] to verify that patient had enough Insulin ordered as she had complaints today that she didn't have enough Insulin. Patient picked up meds for 27 days yesterday with 11 refills. Patient left amb and stable. Laura Avina RN

## 2019-11-11 NOTE — PROGRESS NOTES
Gestational Diabetes Follow-up    Subjective/Objective:    Yanet Reyes Avila sent in blood glucose log for review. Last date of communication was: 10/30/19.    Gestational diabetes is being managed with diet, activity and medications    Taking diabetes medications:   yes:     Diabetes Medication(s)     Biguanides       metFORMIN (GLUCOPHAGE) 500 MG tablet    Take 500 mg by mouth 2 times daily (with meals)    Insulin       insulin isophane human (HUMULIN N PEN) 100 UNIT/ML injection    Inject 55 units each night before bed.          Estimated Date of Delivery: Feb 25, 2020    BG/Food Log:         Assessment:  Blood sugars in target at current insulin doses.  One low blood sugar after dinner, but insulin would not be on board at this time of day.  No changes to insulin dose recommended.    Ketones: none reported.   Fasting blood glucoses: 100% in target, since insulin dose increase.  After breakfast: 100% in target.  After lunch: 100% in target.  After dinner: 100% in target.    Plan/Response:  No changes in the patient's current treatment plan.  Follow-up in 1 week.    Called patient with .  Patient verbalized no changes this week and will send in again in 1 wee.    Ana Dias MS, RD, LD, CDE      Any diabetes medication dose changes were made via the CDE Protocol and Collaborative Practice Agreement with the patient's endocrinology provider. A copy of this encounter was shared with the provider.

## 2019-11-11 NOTE — PROGRESS NOTES
[facility]  MATERNAL CHILD HEALTH - SOCIAL WORK PROGRESS NOTE    DATA:     SW participated in a NICU consult with Dr. Bowden and [facility].  Marni was joined by her partner, Khanh, and Gloria provided professional Cook Islander interpretation.  Marni is a  and is currently 24w6d pregnant.  She and Khanh reside in [place], [place] with her sister, brother-in-law, and their child.    Fetal diagnoses:    Anhydramnios, FGR, Breech    INTERVENTION:       Introduction to Maternal Child Health  role and SW contact information.    SW completed chart review and collaborated with the multidisciplinary team.    Participated in a NICU consult in [facility] clinic.    Reassured family of ongoing SW supportive services available to them at the hospital.    Encouraged parents to access this SW for support as needed.    ASSESSMENT:     Marni appeared open to and appreciative of ongoing therapeutic support and connection with resources as needed.  She was engaged and asked appropriate questions during NICU consult, and seemed appreciative of information given.  SW offered to follow up with Marni via telephone later in week, and she welcomed ongoing contact.    PLAN:     SW will continue to follow throughout pt's Maternal-Child Health Journey to offer psychosocial support, access to appropriate resources, and will continue to collaborate with the multidisciplinary team.    Anabel Marshall, [facility]  Clinical   Maternal Child Health  Phone: [phone]  Pager: [phone]

## 2019-11-12 NOTE — PROGRESS NOTES
NICU Consult  Date: 2019    I had the pleasure of meeting with Ms. Janet Reyes Avila and her partner, Khanh, in the [facility] for  counseling; this was at the request of Dr. Pruitt of Maternal Fetal Medicine. This visit was conducted with the assistance of a . Ирина is currently pregnant at 24 and 6/7 weeks  gestation. This pregnancy has been complicated by pre-gestational T2DM, anhydramnios, fetal growth restriction and intermittently abnormal umbilical artery dopplers. This constellation is thought to be secondary to poor placental function. Infectious and antiphospholipid testing has been negative. NIPT demonstrates low risk of fetal aneuploidy. Fetal kidneys and bladder have been visualized and are without gross abnormality. There has been no evidence of PPROM. Ирина is currently receiving close outpatient monitoring and has been counseled that if there is evidence of change in umbilical artery resistive indices or concern about fetal status or growth, inpatient monitoring and even medically indicated  delivery may be recommended.     During our visit, we discussed the survival rates and potential range of outcomes related to  birth between ~25 and 30 weeks  gestation, should this be indicated. 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 We also discussed the possibility of an underlying genetic etiology, which could alter the range of outcomes expected for Ирина s baby. We talked at length about the very guarded prognosis associated with pulmonary hypoplasia, particularly in very  infants, but also at older gestational ages in the context of continued anhydramnios. I described too that those first few minutes to hours are most helpful in determining just how much functional  pulmonary tissue is present and this will be important in determining the infant s ability to survive. We discussed the anticipated need for mechanical ventilation and the risks for pneumothoraces that babies with pulmonary hypoplasia can develop, requiring the placement of chest tubes.    Our discussion also included review of other issues affecting infants born prematurely, including intracranial hemorrhage that can be of varying severity, CP, blindness, deafness and  infections given their immature immune system. I discussed anticipated plans for the infant s immediate care related to the time of delivery, including the presence of a the NICU team in the delivery room and close communication with family about any resuscitative efforts. At this time, Ирина expresses that, should medical  delivery be indicated at any time moving forward, she desires full resuscitation attempts.     I discussed the overall makeup of the medical team and healthcare providers in the [facility].  I discussed, too, other aspects of the NICU including welcoming policies with the requirement for the influenza vaccine for all people entering the NICU during viral season.  A tour of the NICU was provided to them following our consultation along with my contact information should questions arise following this clinic visit.      Thank you very much for the opportunity to meet Ирина.  Please do not hesitate to contact me with any questions regarding this consultation.    Sincerely,    Taylor Bowden MD  [facility]    Total time spent:  45 minutes; 100% of the time spent in direct patient counseling.

## 2019-11-13 PROBLEM — O36.5920 POOR FETAL GROWTH AFFECTING MANAGEMENT OF MOTHER IN SECOND TRIMESTER: Status: ACTIVE | Noted: 2019-11-13

## 2019-11-13 NOTE — ASSESSMENT & PLAN NOTE
DM type 2.  Hgb A1c improved.  On NPH 55 units at night and metformin 1000 mg BID.  Reports BS within range, but did not have her log book.
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
## 2019-11-14 ENCOUNTER — HOSPITAL ENCOUNTER (OUTPATIENT)
Dept: ULTRASOUND IMAGING | Facility: CLINIC | Age: 39
Discharge: HOME | End: 2019-11-14
Attending: OBSTETRICS & GYNECOLOGY | Admitting: OBSTETRICS & GYNECOLOGY
Payer: COMMERCIAL

## 2019-11-14 ENCOUNTER — OFFICE VISIT (OUTPATIENT)
Dept: MATERNAL FETAL MEDICINE | Facility: CLINIC | Age: 39
End: 2019-11-14
Attending: OBSTETRICS & GYNECOLOGY
Payer: COMMERCIAL

## 2019-11-14 ENCOUNTER — TELEPHONE (OUTPATIENT)
Dept: CARE COORDINATION | Facility: CLINIC | Age: 39
End: 2019-11-14

## 2019-11-14 DIAGNOSIS — O41.02X0 ANHYDRAMNIOS IN SECOND TRIMESTER, SINGLE OR UNSPECIFIED FETUS: Primary | ICD-10-CM

## 2019-11-14 DIAGNOSIS — O36.5920 INTRAUTERINE GROWTH RESTRICTION (IUGR) AFFECTING CARE OF MOTHER, SECOND TRIMESTER, SINGLE OR UNSPECIFIED FETUS: ICD-10-CM

## 2019-11-14 PROCEDURE — 76819 FETAL BIOPHYS PROFIL W/O NST: CPT

## 2019-11-14 PROCEDURE — 76820 UMBILICAL ARTERY ECHO: CPT | Performed by: OBSTETRICS & GYNECOLOGY

## 2019-11-14 NOTE — PROGRESS NOTES
Please see full imaging report from [employer] program under imaging tab.    Findings reviewed with Marni with the assistance of an  today. She is aware of the ongoing guarded prognosis for this pregnancy with early onset IUGR, low/absent amniotic flow and with intermittent absent end diastolic flow on the UAR assessment. I recommend ongoing close surveillance, and she is scheduled to see us again on Monday. She is transferring her care to the [facility] team, in anticipation of likely antepartum admission and very early delivery. She is planning to take a leave from work, and plans to bring us the paperwork to complete as soon as she has this available.     Silvia Talley MD  Maternal Fetal Medicine

## 2019-11-14 NOTE — TELEPHONE ENCOUNTER
"[facility]  MATERNAL CHILD HEALTH - SOCIAL WORK PROGRESS NOTE    DATA:     SW called Marni with  as follow-up from NICU Consult on 11.11.19.  She processed the appointments, and acknowledged the sadness that she has been feeling during this pregnancy.  Marni denied having any mental health history or concerns in the past, but endorsed currently feeling very sad and that her situation is hard to talk about with others.  No concerns for self-harm, SI or HI identified, and she described that she is \"trying to live a normal life\" and pushes herself to keep going each day using her baby as motivation.  She reported sometimes she takes a nap during times of sadness, and we discussed how this can be a healthy coping strategy in moderation.  SW informed Marni of support available in the form of individual or group therapy, and she declined referral at this time.  Marni stated that she is utilizing her sister and partner for emotional support, and this experience is bringing her closer to her [employer].  She reported having people at [employer] praying for her/her baby, and this brings her peace.  ALYSSA reinforced the importance of utilizing these supports, and the healthy ways in which Marni is coping.      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  She is currently on a leave of absence and stated that her employer has paperwork that needs to be completed for this leave.  ALYSSA inquired if she needs assistance with getting leave of absence paperwork completed, and Marni was unaware that she could bring documentation to the clinic.  Her plan is to obtain paperwork from employer, and bring to next appointment.  ALYSSA notified [facility] that she will likely need follow-up assistance with this.  ALYSSA encouraged pt to call as needed regarding ongoing practical and emotional supports.    INTERVENTION:      - Supportive follow-up for " ongoing assessment of needs.   - Active listening and validation of expressed concerns.    ASSESSMENT:     Marni was initially somewhat guarded in conversation, but as time progressed opened up more about her sadness in processing her pregnancy complications and concerns for her baby.  She seems very saddened by the situation and seems to be accessing supports close to her.  She may benefit from counseling in the future, but not interested in additional supports at this time.  She seems willing and able to reach out to SW as needed, and was encouraged to do so.    PLAN:     SW will continue to assess needs, offer psychosocial support, coordinate referrals to resources as needed, and collaborate with the multidisciplinary team.    Anabel Marshall, [facility]  Clinical   Maternal Child Health  Phone: [phone]  Pager: [phone]

## 2019-11-18 ENCOUNTER — HOSPITAL ENCOUNTER (OUTPATIENT)
Dept: ULTRASOUND IMAGING | Facility: CLINIC | Age: 39
Discharge: HOME | End: 2019-11-18
Attending: OBSTETRICS & GYNECOLOGY | Admitting: OBSTETRICS & GYNECOLOGY
Payer: COMMERCIAL

## 2019-11-18 ENCOUNTER — OFFICE VISIT (OUTPATIENT)
Dept: MATERNAL FETAL MEDICINE | Facility: CLINIC | Age: 39
End: 2019-11-18
Attending: OBSTETRICS & GYNECOLOGY
Payer: COMMERCIAL

## 2019-11-18 DIAGNOSIS — O36.5920 INTRAUTERINE GROWTH RESTRICTION (IUGR) AFFECTING CARE OF MOTHER, SECOND TRIMESTER, SINGLE OR UNSPECIFIED FETUS: ICD-10-CM

## 2019-11-18 DIAGNOSIS — O41.02X0 ANHYDRAMNIOS IN SECOND TRIMESTER, SINGLE OR UNSPECIFIED FETUS: Primary | ICD-10-CM

## 2019-11-18 PROCEDURE — 76819 FETAL BIOPHYS PROFIL W/O NST: CPT

## 2019-11-18 PROCEDURE — 76820 UMBILICAL ARTERY ECHO: CPT | Performed by: OBSTETRICS & GYNECOLOGY

## 2019-11-18 NOTE — NURSING NOTE
here for [facility] appointment on 11/18/19...    Name: Juancho Stringer  ID# [id number]  Agency Name: [employer]

## 2019-11-18 NOTE — PROGRESS NOTES
"Please see \"Imaging\" tab under \"Chart Review\" for details of today's US.    Mary Kay Pruitt, DO    "

## 2019-11-21 ENCOUNTER — OFFICE VISIT (OUTPATIENT)
Dept: MATERNAL FETAL MEDICINE | Facility: CLINIC | Age: 39
End: 2019-11-21
Attending: OBSTETRICS & GYNECOLOGY
Payer: COMMERCIAL

## 2019-11-21 ENCOUNTER — HOSPITAL ENCOUNTER (OUTPATIENT)
Dept: ULTRASOUND IMAGING | Facility: CLINIC | Age: 39
Discharge: HOME | End: 2019-11-21
Attending: OBSTETRICS & GYNECOLOGY | Admitting: OBSTETRICS & GYNECOLOGY
Payer: COMMERCIAL

## 2019-11-21 ENCOUNTER — PATIENT OUTREACH (OUTPATIENT)
Dept: EDUCATION SERVICES | Facility: CLINIC | Age: 39
End: 2019-11-21

## 2019-11-21 DIAGNOSIS — O41.02X0 ANHYDRAMNIOS IN SECOND TRIMESTER, SINGLE OR UNSPECIFIED FETUS: Primary | ICD-10-CM

## 2019-11-21 DIAGNOSIS — O36.5920 INTRAUTERINE GROWTH RESTRICTION (IUGR) AFFECTING CARE OF MOTHER, SECOND TRIMESTER, SINGLE OR UNSPECIFIED FETUS: ICD-10-CM

## 2019-11-21 PROCEDURE — 76820 UMBILICAL ARTERY ECHO: CPT | Performed by: OBSTETRICS & GYNECOLOGY

## 2019-11-21 PROCEDURE — 76815 OB US LIMITED FETUS(S): CPT

## 2019-11-21 NOTE — PROGRESS NOTES
T2D pregnancy Diabetes Follow-up    Subjective/Objective:    Yanet Reyes Avila sent in blood glucose log for review. Last date of communication was: 11/11.    Gestational diabetes is being managed with medications    Taking diabetes medications:   yes:     Diabetes Medication(s)     Biguanides       metFORMIN (GLUCOPHAGE) 500 MG tablet    Take 500 mg by mouth 2 times daily (with meals)    Insulin       insulin isophane human (HUMULIN N PEN) 100 UNIT/ML injection    Inject 55 units each night before bed.          Estimated Date of Delivery: Feb 25, 2020    BG/Food Log:           Assessment:    Fasting blood glucoses: 89% in target.  After breakfast: 100% in target.  After lunch: 100% in target.  After dinner: 100% in target.    Plan/Response:  No changes in the patient's current treatment plan.    Called Marni with [employer] : got voice mail and left message  ~ Numbers are (nearly) all in goal, you are doing very well with diabetes care  ~ No need to make any changes today  ~ Please email again in 2 weeks on Dec. 5 or sooner if you see any high numbers      Adenike Concepcion RD, LD, CDE      Any diabetes medication dose changes were made via the CDE Protocol and Collaborative Practice Agreement with the patient's OB/GYN provider. A copy of this encounter was shared with the provider.

## 2019-11-21 NOTE — PROGRESS NOTES
Please see full imaging report from [employer] program under imaging tab.    I reviewed today's findings with Marni with the assistance of an . She did have some questions about the evaluation earlier this week and I reinforced the discussion at that time, including the offer for admission to the hospital to get betamethasone (she has suboptimally controlled diabetes so I am hesitant to treat as an outpatient) and close surveillance incase timely delivery is indicated. She expressed to me that she understands our concerns and the risks for the pregnancy, including IUFD, but simply cannot stay in the hospital until it is time for delivery. She is unable to unwilling to express to me why this is but says she just can't stay. She presents today with another women who she identifies as a sister.     At this time I again offered admission and it was declined. She will return to see us in our primary MFM clinic on Monday, when fetal growth will also be obtained and we will determine next recommended steps, and what she will agree to for treatment.     Silvia Talley MD  Maternal Fetal Medicine

## 2019-11-23 ENCOUNTER — TELEPHONE (OUTPATIENT)
Dept: ENDOCRINOLOGY | Facility: CLINIC | Age: 39
End: 2019-11-23

## 2019-11-23 NOTE — TELEPHONE ENCOUNTER
Please reach out to her Monday 11/25 and get the latest download of her BS meter, check how things are going.  Ask her what doses of insulin she is taking.  Ideally she would be regularly  SEEN in our clinic by one of our PAs.  She has been scheduled for multiple telephone visits with me, all suboptimal.    This will require .   Lori Enamorado MD

## 2019-11-25 ENCOUNTER — OFFICE VISIT (OUTPATIENT)
Dept: MATERNAL FETAL MEDICINE | Facility: CLINIC | Age: 39
End: 2019-11-25
Attending: OBSTETRICS & GYNECOLOGY
Payer: COMMERCIAL

## 2019-11-25 ENCOUNTER — TELEPHONE (OUTPATIENT)
Dept: ENDOCRINOLOGY | Facility: CLINIC | Age: 39
End: 2019-11-25

## 2019-11-25 ENCOUNTER — HOSPITAL ENCOUNTER (OUTPATIENT)
Dept: ULTRASOUND IMAGING | Facility: CLINIC | Age: 39
Discharge: HOME | End: 2019-11-25
Attending: OBSTETRICS & GYNECOLOGY | Admitting: OBSTETRICS & GYNECOLOGY
Payer: COMMERCIAL

## 2019-11-25 VITALS
WEIGHT: 187.5 LBS | DIASTOLIC BLOOD PRESSURE: 77 MMHG | SYSTOLIC BLOOD PRESSURE: 134 MMHG | RESPIRATION RATE: 18 BRPM | BODY MASS INDEX: 32.18 KG/M2 | HEART RATE: 85 BPM

## 2019-11-25 DIAGNOSIS — E11.8 TYPE 2 DIABETES MELLITUS WITH COMPLICATION (H): ICD-10-CM

## 2019-11-25 DIAGNOSIS — O36.5920 INTRAUTERINE GROWTH RESTRICTION (IUGR) AFFECTING CARE OF MOTHER, SECOND TRIMESTER, SINGLE OR UNSPECIFIED FETUS: ICD-10-CM

## 2019-11-25 PROCEDURE — 76820 UMBILICAL ARTERY ECHO: CPT | Performed by: OBSTETRICS & GYNECOLOGY

## 2019-11-25 PROCEDURE — G0463 HOSPITAL OUTPT CLINIC VISIT: HCPCS | Mod: 25,ZF

## 2019-11-25 PROCEDURE — 76816 OB US FOLLOW-UP PER FETUS: CPT

## 2019-11-25 ASSESSMENT — PATIENT HEALTH QUESTIONNAIRE - PHQ9: SUM OF ALL RESPONSES TO PHQ QUESTIONS 1-9: 1

## 2019-11-25 NOTE — TELEPHONE ENCOUNTER
Please schedule her with Mallika Borja  12/19/19 12 noon RTN Gestational  I placed it on hold and patient  approved the appt. Rabia Thomas RN on 11/25/2019 at 12:06 PM

## 2019-11-25 NOTE — PROGRESS NOTES
"[facility] Obstetrical Visit    S: c/o occasional contractions. Not certain if she feels fetal movement. Mild reflux sx.    O:  /77 (BP Location: Left arm, Patient Position: Chair)   Pulse 85   Resp 18   Wt 85 kg (187 lb 8 oz)   LMP 05/11/2019   BMI 32.18 kg/m      Abd - NT    FBS 93-95, 2 hour PP < 140.    A/P:  1) IUP at 26w6d - Severe IUGR and anhydramnios. Unexplained cause. Will continue 2 x weekly ZENON/doppler and begin BPPs at 28 weeks. Plan admission if reversal in UAR or DV waveform.     2) Type 2 DM - On Metformin 1000 bid. Humulin 50 units at bedtime. Good control.    Please see \"Imaging\" tab under \"Chart Review\" for details of today's US at the [facility].    Jah Carreon MD  Maternal-Fetal Medicine      "

## 2019-11-25 NOTE — TELEPHONE ENCOUNTER
She is currently on Humulin N 55 units PM  She is feeling good no more dizziness. They tell her theres not enough amniotic fluid  for baby that she will be seen for to check  on so is concerned.  Scheduled to see Mallika Borja 12/19/19 .  BS:   11/ 25/19  AM  93    11/24/19  10:54 AM 96= 1:32PM 116= 7PM 120= 11 pm 109    11/23/19  9:17 AM  94= 1:27 PM  109= 7 pm 116= 11:40     11/22/19 7:30 am  93= 10:19 = 5:58PM 109= 9:30 PM  101    11/21/19  6:30 Am 93= 11AM 103= 4:49 pm 108= 10:34 Pm  106     Rabia Thomas RN on 11/25/2019 at 12:13 PM

## 2019-11-25 NOTE — NURSING NOTE
[place] [id number] with [facility] agency present and interpreted for entire visit.

## 2019-11-25 NOTE — NURSING NOTE
Marni accompanied by her sister to today's visit.  Manri seen in clinic today for follow up at 26w6d gestation due to pregnancy c/b severe FGR, abn UAR, anhydramnios, abn AFP, type 2 DM on insulin (see report/notes). VSS. Pt reports + fetal movement. Denies s/sx of PTL or preeclampsia. Pt denies bldg/lof/change in discharge/contractions/headache/vision changes/chest pain/SOB/edema. Dr. Carreon met with pt and discussed POC. Plan to continue 2x/week surveillance.  Will begin BPP at 28 weeks. Reviewed contact information with patient. PCC contact card given to patient. PHQ-9 completed =1.  Pt discharged stable and ambulatory.       Laura Mensah RN

## 2019-11-29 ENCOUNTER — HOSPITAL ENCOUNTER (OUTPATIENT)
Dept: ULTRASOUND IMAGING | Facility: CLINIC | Age: 39
Discharge: HOME | End: 2019-11-29
Attending: OBSTETRICS & GYNECOLOGY | Admitting: OBSTETRICS & GYNECOLOGY
Payer: COMMERCIAL

## 2019-11-29 ENCOUNTER — OFFICE VISIT (OUTPATIENT)
Dept: MATERNAL FETAL MEDICINE | Facility: CLINIC | Age: 39
End: 2019-11-29
Attending: OBSTETRICS & GYNECOLOGY
Payer: COMMERCIAL

## 2019-11-29 DIAGNOSIS — E11.8 TYPE 2 DIABETES MELLITUS WITH COMPLICATION (H): ICD-10-CM

## 2019-11-29 DIAGNOSIS — O36.5920 INTRAUTERINE GROWTH RESTRICTION (IUGR) AFFECTING CARE OF MOTHER, SECOND TRIMESTER, SINGLE OR UNSPECIFIED FETUS: ICD-10-CM

## 2019-11-29 PROCEDURE — 76815 OB US LIMITED FETUS(S): CPT

## 2019-11-29 PROCEDURE — 76820 UMBILICAL ARTERY ECHO: CPT | Performed by: OBSTETRICS & GYNECOLOGY

## 2019-11-29 NOTE — PROGRESS NOTES
"Please see \"Imaging\" tab under Chart Review for full details.    Luna Wren MD  Maternal Fetal Medicine    "

## 2019-12-02 ENCOUNTER — HOSPITAL ENCOUNTER (OUTPATIENT)
Dept: ULTRASOUND IMAGING | Facility: CLINIC | Age: 39
Discharge: HOME | End: 2019-12-02
Attending: OBSTETRICS & GYNECOLOGY | Admitting: OBSTETRICS & GYNECOLOGY
Payer: COMMERCIAL

## 2019-12-02 ENCOUNTER — HOSPITAL ENCOUNTER (INPATIENT)
Facility: CLINIC | Age: 39
LOS: 6 days | Discharge: HOME | End: 2019-12-08
Attending: OBSTETRICS & GYNECOLOGY | Admitting: OBSTETRICS & GYNECOLOGY
Payer: COMMERCIAL

## 2019-12-02 ENCOUNTER — HOSPITAL ENCOUNTER (OUTPATIENT)
Facility: CLINIC | Age: 39
End: 2019-12-02
Admitting: OBSTETRICS & GYNECOLOGY
Payer: COMMERCIAL

## 2019-12-02 ENCOUNTER — OFFICE VISIT (OUTPATIENT)
Dept: MATERNAL FETAL MEDICINE | Facility: CLINIC | Age: 39
End: 2019-12-02
Attending: OBSTETRICS & GYNECOLOGY
Payer: COMMERCIAL

## 2019-12-02 DIAGNOSIS — Z98.891 S/P C-SECTION: ICD-10-CM

## 2019-12-02 DIAGNOSIS — E11.8 TYPE 2 DIABETES MELLITUS WITH COMPLICATION (H): ICD-10-CM

## 2019-12-02 DIAGNOSIS — O36.5920 INTRAUTERINE GROWTH RESTRICTION (IUGR) AFFECTING CARE OF MOTHER, SECOND TRIMESTER, SINGLE OR UNSPECIFIED FETUS: ICD-10-CM

## 2019-12-02 DIAGNOSIS — Z3A.28 28 WEEKS GESTATION OF PREGNANCY: Primary | ICD-10-CM

## 2019-12-02 DIAGNOSIS — O41.02X0 ANHYDRAMNIOS IN SECOND TRIMESTER, SINGLE OR UNSPECIFIED FETUS: Primary | ICD-10-CM

## 2019-12-02 PROBLEM — O41.00X0 ANHYDRAMNIOS: Status: ACTIVE | Noted: 2019-12-02

## 2019-12-02 LAB
ABO + RH BLD: NORMAL
ABO + RH BLD: NORMAL
ALBUMIN SERPL-MCNC: 3 G/DL (ref 3.4–5)
ALP SERPL-CCNC: 81 U/L (ref 40–150)
ALT SERPL W P-5'-P-CCNC: 23 U/L (ref 0–50)
ANION GAP SERPL CALCULATED.3IONS-SCNC: 8 MMOL/L (ref 3–14)
AST SERPL W P-5'-P-CCNC: 14 U/L (ref 0–45)
BASOPHILS # BLD AUTO: 0 10E9/L (ref 0–0.2)
BASOPHILS NFR BLD AUTO: 0.1 %
BILIRUB SERPL-MCNC: <0.1 MG/DL (ref 0.2–1.3)
BLD GP AB SCN SERPL QL: NORMAL
BLOOD BANK CMNT PATIENT-IMP: NORMAL
BUN SERPL-MCNC: 8 MG/DL (ref 7–30)
CALCIUM SERPL-MCNC: 9 MG/DL (ref 8.5–10.1)
CHLORIDE SERPL-SCNC: 109 MMOL/L (ref 94–109)
CO2 SERPL-SCNC: 21 MMOL/L (ref 20–32)
CREAT SERPL-MCNC: 0.34 MG/DL (ref 0.52–1.04)
CREAT UR-MCNC: 28 MG/DL
DIFFERENTIAL METHOD BLD: ABNORMAL
EOSINOPHIL # BLD AUTO: 0.1 10E9/L (ref 0–0.7)
EOSINOPHIL NFR BLD AUTO: 0.8 %
ERYTHROCYTE [DISTWIDTH] IN BLOOD BY AUTOMATED COUNT: 12.8 % (ref 10–15)
GFR SERPL CREATININE-BSD FRML MDRD: >90 ML/MIN/{1.73_M2}
GLUCOSE BLDC GLUCOMTR-MCNC: 147 MG/DL (ref 70–99)
GLUCOSE BLDC GLUCOMTR-MCNC: 89 MG/DL (ref 70–99)
GLUCOSE SERPL-MCNC: 88 MG/DL (ref 70–99)
HCT VFR BLD AUTO: 34.9 % (ref 35–47)
HGB BLD-MCNC: 11.8 G/DL (ref 11.7–15.7)
IMM GRANULOCYTES # BLD: 0 10E9/L (ref 0–0.4)
IMM GRANULOCYTES NFR BLD: 0.1 %
LYMPHOCYTES # BLD AUTO: 3.4 10E9/L (ref 0.8–5.3)
LYMPHOCYTES NFR BLD AUTO: 33.2 %
MCH RBC QN AUTO: 30.8 PG (ref 26.5–33)
MCHC RBC AUTO-ENTMCNC: 33.8 G/DL (ref 31.5–36.5)
MCV RBC AUTO: 91 FL (ref 78–100)
MONOCYTES # BLD AUTO: 0.8 10E9/L (ref 0–1.3)
MONOCYTES NFR BLD AUTO: 7.5 %
NEUTROPHILS # BLD AUTO: 6 10E9/L (ref 1.6–8.3)
NEUTROPHILS NFR BLD AUTO: 58.3 %
NRBC # BLD AUTO: 0 10*3/UL
NRBC BLD AUTO-RTO: 0 /100
PLATELET # BLD AUTO: 188 10E9/L (ref 150–450)
POTASSIUM SERPL-SCNC: 3.5 MMOL/L (ref 3.4–5.3)
PROT SERPL-MCNC: 7 G/DL (ref 6.8–8.8)
PROT UR-MCNC: 0.21 G/L
PROT/CREAT 24H UR: 0.74 G/G CR (ref 0–0.2)
RBC # BLD AUTO: 3.83 10E12/L (ref 3.8–5.2)
SODIUM SERPL-SCNC: 138 MMOL/L (ref 133–144)
SPECIMEN EXP DATE BLD: NORMAL
WBC # BLD AUTO: 10.2 10E9/L (ref 4–11)

## 2019-12-02 PROCEDURE — 59025 FETAL NON-STRESS TEST: CPT | Mod: ZF | Performed by: OBSTETRICS & GYNECOLOGY

## 2019-12-02 PROCEDURE — 00000146 ZZHCL STATISTIC GLUCOSE BY METER IP

## 2019-12-02 PROCEDURE — 86900 BLOOD TYPING SEROLOGIC ABO: CPT | Performed by: STUDENT IN AN ORGANIZED HEALTH CARE EDUCATION/TRAINING PROGRAM

## 2019-12-02 PROCEDURE — 86901 BLOOD TYPING SEROLOGIC RH(D): CPT | Performed by: STUDENT IN AN ORGANIZED HEALTH CARE EDUCATION/TRAINING PROGRAM

## 2019-12-02 PROCEDURE — 86850 RBC ANTIBODY SCREEN: CPT | Performed by: STUDENT IN AN ORGANIZED HEALTH CARE EDUCATION/TRAINING PROGRAM

## 2019-12-02 PROCEDURE — 85025 COMPLETE CBC W/AUTO DIFF WBC: CPT | Performed by: STUDENT IN AN ORGANIZED HEALTH CARE EDUCATION/TRAINING PROGRAM

## 2019-12-02 PROCEDURE — 25000131 ZZH RX MED GY IP 250 OP 636 PS 637: Performed by: STUDENT IN AN ORGANIZED HEALTH CARE EDUCATION/TRAINING PROGRAM

## 2019-12-02 PROCEDURE — 76820 UMBILICAL ARTERY ECHO: CPT

## 2019-12-02 PROCEDURE — 84156 ASSAY OF PROTEIN URINE: CPT | Performed by: STUDENT IN AN ORGANIZED HEALTH CARE EDUCATION/TRAINING PROGRAM

## 2019-12-02 PROCEDURE — 80053 COMPREHEN METABOLIC PANEL: CPT | Performed by: STUDENT IN AN ORGANIZED HEALTH CARE EDUCATION/TRAINING PROGRAM

## 2019-12-02 PROCEDURE — 25000128 H RX IP 250 OP 636: Performed by: STUDENT IN AN ORGANIZED HEALTH CARE EDUCATION/TRAINING PROGRAM

## 2019-12-02 PROCEDURE — 12000001 ZZH R&B MED SURG/OB UMMC

## 2019-12-02 PROCEDURE — 36415 COLL VENOUS BLD VENIPUNCTURE: CPT | Performed by: STUDENT IN AN ORGANIZED HEALTH CARE EDUCATION/TRAINING PROGRAM

## 2019-12-02 PROCEDURE — 76818 FETAL BIOPHYS PROFILE W/NST: CPT

## 2019-12-02 PROCEDURE — 25000132 ZZH RX MED GY IP 250 OP 250 PS 637: Performed by: STUDENT IN AN ORGANIZED HEALTH CARE EDUCATION/TRAINING PROGRAM

## 2019-12-02 RX ORDER — PRENATAL VIT/IRON FUM/FOLIC AC 27MG-0.8MG
1 TABLET ORAL DAILY
Status: DISCONTINUED | OUTPATIENT
Start: 2019-12-03 | End: 2019-12-05

## 2019-12-02 RX ORDER — ONDANSETRON 2 MG/ML
4 INJECTION INTRAMUSCULAR; INTRAVENOUS EVERY 6 HOURS PRN
Status: DISCONTINUED | OUTPATIENT
Start: 2019-12-02 | End: 2019-12-05

## 2019-12-02 RX ORDER — ACETAMINOPHEN 325 MG/1
975 TABLET ORAL EVERY 4 HOURS PRN
Status: DISCONTINUED | OUTPATIENT
Start: 2019-12-02 | End: 2019-12-05

## 2019-12-02 RX ORDER — NICOTINE POLACRILEX 4 MG
15-30 LOZENGE BUCCAL
Status: DISCONTINUED | OUTPATIENT
Start: 2019-12-02 | End: 2019-12-05

## 2019-12-02 RX ORDER — DEXTROSE MONOHYDRATE 25 G/50ML
25-50 INJECTION, SOLUTION INTRAVENOUS
Status: DISCONTINUED | OUTPATIENT
Start: 2019-12-02 | End: 2019-12-05

## 2019-12-02 RX ORDER — ASPIRIN 81 MG/1
162 TABLET, CHEWABLE ORAL DAILY
Status: DISCONTINUED | OUTPATIENT
Start: 2019-12-03 | End: 2019-12-05

## 2019-12-02 RX ORDER — DOCUSATE SODIUM 100 MG/1
100 CAPSULE, LIQUID FILLED ORAL 2 TIMES DAILY
Status: DISCONTINUED | OUTPATIENT
Start: 2019-12-02 | End: 2019-12-05

## 2019-12-02 RX ORDER — BISACODYL 10 MG
10 SUPPOSITORY, RECTAL RECTAL DAILY PRN
Status: DISCONTINUED | OUTPATIENT
Start: 2019-12-04 | End: 2019-12-05

## 2019-12-02 RX ORDER — BETAMETHASONE SODIUM PHOSPHATE AND BETAMETHASONE ACETATE 3; 3 MG/ML; MG/ML
12 INJECTION, SUSPENSION INTRA-ARTICULAR; INTRALESIONAL; INTRAMUSCULAR; SOFT TISSUE EVERY 24 HOURS
Status: COMPLETED | OUTPATIENT
Start: 2019-12-02 | End: 2019-12-03

## 2019-12-02 RX ADMIN — METFORMIN HYDROCHLORIDE 1000 MG: 500 TABLET, FILM COATED ORAL at 21:01

## 2019-12-02 RX ADMIN — INSULIN HUMAN 55 UNITS: 100 INJECTION, SUSPENSION SUBCUTANEOUS at 22:17

## 2019-12-02 RX ADMIN — BETAMETHASONE SODIUM PHOSPHATE AND BETAMETHASONE ACETATE 12 MG: 3; 3 INJECTION, SUSPENSION INTRA-ARTICULAR; INTRALESIONAL; INTRAMUSCULAR at 20:04

## 2019-12-02 NOTE — NURSING NOTE
NST performed due to failed BPP 4/8 (no fluid and no fetal tone).  Dr. Ohara reviewed EFM tracing. See NST/BPP Doc Flowsheet tab.  Patient will be admitted to [facility] for further evaluation. coreen Billy RN in L&D notfiied that patient will be coming directly from our clinic to be admitted.  NICU contacted by Dr. Ohara for acceptance and is okay for patient to come.  Marni given map to get to hospital and all questions answered.   used via phone for conversation.

## 2019-12-03 ENCOUNTER — OFFICE VISIT (OUTPATIENT)
Dept: INTERPRETER SERVICES | Facility: CLINIC | Age: 39
End: 2019-12-03
Payer: COMMERCIAL

## 2019-12-03 ENCOUNTER — HOSPITAL ENCOUNTER (INPATIENT)
Dept: ULTRASOUND IMAGING | Facility: CLINIC | Age: 39
End: 2019-12-03
Attending: OBSTETRICS & GYNECOLOGY | Admitting: OBSTETRICS & GYNECOLOGY
Payer: COMMERCIAL

## 2019-12-03 LAB
GLUCOSE BLDC GLUCOMTR-MCNC: 116 MG/DL (ref 70–99)
GLUCOSE BLDC GLUCOMTR-MCNC: 120 MG/DL (ref 70–99)
GLUCOSE BLDC GLUCOMTR-MCNC: 123 MG/DL (ref 70–99)
GLUCOSE BLDC GLUCOMTR-MCNC: 125 MG/DL (ref 70–99)
GLUCOSE BLDC GLUCOMTR-MCNC: 126 MG/DL (ref 70–99)
GLUCOSE BLDC GLUCOMTR-MCNC: 136 MG/DL (ref 70–99)
GLUCOSE BLDC GLUCOMTR-MCNC: 138 MG/DL (ref 70–99)
GLUCOSE BLDC GLUCOMTR-MCNC: 157 MG/DL (ref 70–99)
GLUCOSE BLDC GLUCOMTR-MCNC: 161 MG/DL (ref 70–99)

## 2019-12-03 PROCEDURE — 25000128 H RX IP 250 OP 636: Performed by: STUDENT IN AN ORGANIZED HEALTH CARE EDUCATION/TRAINING PROGRAM

## 2019-12-03 PROCEDURE — 76819 FETAL BIOPHYS PROFIL W/O NST: CPT

## 2019-12-03 PROCEDURE — 12000001 ZZH R&B MED SURG/OB UMMC

## 2019-12-03 PROCEDURE — T1013 SIGN LANG/ORAL INTERPRETER: HCPCS | Mod: U3

## 2019-12-03 PROCEDURE — 76820 UMBILICAL ARTERY ECHO: CPT | Performed by: OBSTETRICS & GYNECOLOGY

## 2019-12-03 PROCEDURE — 25000132 ZZH RX MED GY IP 250 OP 250 PS 637: Performed by: STUDENT IN AN ORGANIZED HEALTH CARE EDUCATION/TRAINING PROGRAM

## 2019-12-03 PROCEDURE — 00000146 ZZHCL STATISTIC GLUCOSE BY METER IP

## 2019-12-03 RX ADMIN — PRENATAL VIT W/ FE FUMARATE-FA TAB 27-0.8 MG 1 TABLET: 27-0.8 TAB at 08:32

## 2019-12-03 RX ADMIN — ASPIRIN 81 MG CHEWABLE TABLET 162 MG: 81 TABLET CHEWABLE at 08:31

## 2019-12-03 RX ADMIN — METFORMIN HYDROCHLORIDE 1000 MG: 500 TABLET, FILM COATED ORAL at 08:32

## 2019-12-03 RX ADMIN — DOCUSATE SODIUM 100 MG: 100 CAPSULE, LIQUID FILLED ORAL at 20:06

## 2019-12-03 RX ADMIN — BETAMETHASONE SODIUM PHOSPHATE AND BETAMETHASONE ACETATE 12 MG: 3; 3 INJECTION, SUSPENSION INTRA-ARTICULAR; INTRALESIONAL; INTRAMUSCULAR at 20:05

## 2019-12-03 RX ADMIN — METFORMIN HYDROCHLORIDE 1000 MG: 500 TABLET, FILM COATED ORAL at 18:40

## 2019-12-03 RX ADMIN — DOCUSATE SODIUM 100 MG: 100 CAPSULE, LIQUID FILLED ORAL at 08:30

## 2019-12-03 NOTE — PLAN OF CARE
Pt very happy ultrasound looks better today. Will receive 2nd betamethasone shot at 2000. Continue to check blood sugars and give insulin per orders. Continue to closely monitor.

## 2019-12-03 NOTE — PROVIDER NOTIFICATION
12/03/19 6538   Provider Notification   Provider Name/Title Dr. Durbin   Method of Notification Electronic Page   Request Evaluate - Remote   Notification Reason Variability Change     Page Sent: FHR minimal variability. Repositioned and po hydrating. Do you want IV placed and fluid bolus?     Response: Provider reviewed strip and stated to continue to encourage PO hydration and that monitoring does not need to be extended. Updated patient on plan of care.

## 2019-12-03 NOTE — PLAN OF CARE
Patient admitted for observation for betamethasone and blood sugar control related to diabetes in the setting of an hydramnios and potential  delivery. Fetus is breech and C/S consent signed should the need arise to deliver. Blood sugars 89 and 147.  Patient taking Novolog sliding scale insulin with meals and NPH at bedtime 55 units. FHT cat II.  Received first dose of betamethasone at . Will continue to monitor, patient denies needs at this time.

## 2019-12-03 NOTE — PROGRESS NOTES
[facility]  MATERNAL CHILD HEALTH - SOCIAL WORK PROGRESS NOTE    SW met with pt at bedside for supportive check-in and ongoing follow-up.  Pt reported that she is doing well today, and feels reassured by US this AM.  She is hopeful that she will be able to discharge tomorrow pending ongoing fetal monitoring and BGs.  Marni acknowledged the ongoing stress of this pregnancy, but today denied any concerns today related to her mood.  She appeared to be in better spirits than last phone call with ALYSSA, and her sister was with her at bedside who seemed to be a positive support.  Marni also expressed relief related to her employer and stated that they are not requiring any paperwork to be completed for her to be on leave, and encouraging her to prioritize her health.  She reported that her boss reassured her that her job will be available to her whenever she is able to return to work (she has worked at same restaurant for 7 years), and no longer feels that her job security is threatened.  Due to ongoing financial stressors of temporary loss of income, ALYSSA provided parking pass that pt can also utilize at anticipated follow-up appointments.  SW also addressed pt's questions related to her insurance, and encouraged her to call when she has the paperwork she was referring to available/if further questions arise.  ALYSSA will continue to follow, offer psychosocial support, coordinate referrals to resources as needed, and collaborate with the multidisciplinary team.    Anabel Marshall, [facility]  Clinical   Maternal Child Health  Phone: [phone]  Pager: [phone]

## 2019-12-03 NOTE — PROGRESS NOTES
Please see ultrasound report under imaging tab for details on ultrasound performed today.    Laura Ohara MD  , OB/GYN  Maternal-Fetal Medicine  [email or web address]  [phone] (Academic office)  [phone] (Pager)
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
## 2019-12-03 NOTE — PLAN OF CARE
28 week pt continues observation through beta window and following blood sugars for type II DM.  Pt denies pain, contractions, LOF, VB and VSS.  FHT occ category 2.  Often minimal variability with few to no accelerations and occ variables noted (see flowsheets).  Pt denies needs at this time.  Will continue to monitor pt and update MD with any changes in status.

## 2019-12-03 NOTE — CONSULTS
NEW INPATIENT DIABETES MANAGEMENT CONSULT  Yanet Reyes Avila  Age: 39 year old  MRN # [id number]   YOB: 1980    Chief Complaint: high risk pregnancy, anhydraminos.  Reason for Consult: antepartum diabetes management   Consulting Provider: Ca Mcmillan MD    History of Present Illness:   Marni is a 39 year old female currently at 28 weeks who was admitted 12/2/19 for betamethasone (BMZ).     She has a dx of underlying TIIDM, poorly controlled, since 2016 (initially dx with pre-DM in 2009).   Upon initial diagnosis, she was nonadherent to regimen of Metformin 1000 mg BID, and A1c went to 10%. Improved with increasing compliance with diet and medication regimen.   She also had gestational DM with prior pregnancy 5/2018, requiring insulin initiation. This pregnancy ended in miscarriage.     She has been followed for TIIDM care previously by Dr. Moody with [facility]. She transferred care this pregnancy to [facility], and was followed by Dr. Enamorado. Was initiated on insulin this pregnancy 7/30/19 (38 units at bedtime). She was continued on her prior to pregnancy Metformin 1000mg BID. She has yet to have required addition of morning NPH. Has received diabetes education and nutrition counseling.     Her most recent A1c 6.0 on 9/10/19, which is at goal for pregnancy. She endorses fasting glucoses 90-93, 2 hour postprandials 's, and HS -120 this past week. No lows <75 (one of 78 in past two weeks).   She follows consistent carb diet as outlined by prior RD/CDE providers. She eats four small meals daily. Rare snacks/sweets, and she is careful to moderate the portion size. She is normally very active at home with walking, notes a change in her BG with increased level of activity.     Upon her admission, BG 88>89 in the evening.    Following first dose of Betamethasone, on her PTA regimen:   Fasting/Pre meal: 136>138  2hr PP: 157 (carb coverage had not yet been ordered, so did not receive  any)  0200: 123  (all slightly above target)    Other Active Medical Problems: high risk pregnancy   Diabetes Mellitus Type: underlying TIIDM in pregnancy   Duration: 3 years  Diabetic Complications: none per patient   Prior to Admission Diabetes Regimen:      Pre-pregnancy:   Metformin 1000mg BID  During pregnancy:   Metformin 1000mg BID  NPH at HS, currently at 55 units   Usual BG control PTA:  At goal in pregnancy, 6.0%  History of DKA: no  Able to Detect Hypoglycemia: none less than 75   Usual Diabetes Care Provider: [facility] (Dr. Enamorado)- last visit 11/8/19.   Primary Care Provider: [facility]  Factors Impacting IP Glucose Control: BMZ, reduced level of activity/walking   Current Diet: Orders Placed This Encounter      Moderate Consistent CHO Diet     10 point ROS completed with pertinent positives and negatives noted in the HPI  Past medical, family and social histories are reviewed and updated.    Social History    Tobacco: never    Alcohol: denies    Marital Status: single    Place of Residence: [place], [place]    Occupation: homemaker    Family History   mother and sister with TIIDM    Physical Exam   /69   Pulse 91   Temp 97.7  F (36.5  C) (Oral)   Resp 18   Wt 84.4 kg (186 lb)   LMP 05/11/2019   Breastfeeding No   BMI 31.93 kg/m    General: pleasant, in no distress. Sister at bedside and attentive. iPad  utilized    HEENT: normocephalic, atraumatic. Oral mucous membranes moist.   Lungs: unlabored respiration, no cough  ABD: rounded, soft, no lipodystrophy noted  Skin: warm and dry, no obvious lesions  MSK:  moves all extremities  Lymp:  no LE edema   Mental status:  alert, oriented to self, place, time  Psych:  affect, calm and appropriate interaction     Most Recent Laboratory Tests:  Recent Labs   Lab 12/02/19 1938   HGB 11.8     No results for input(s): A1C in the last 168 hours.  Recent Labs   Lab 12/02/19 1940   CR 0.34*     Recent Labs   Lab  12/03/19  1031 12/03/19  0835 12/03/19  0628 12/03/19  0233 12/02/19  2216 12/02/19 2000 12/02/19  1940   GLC  --   --   --   --   --   --  88   * 138* 136* 123* 147* 89  --        Assessment:   1) Underlying TIIDM in high risk pregnancy, with anhydraminos, breech presentation  2) Steroid induced hyperglycemia      Second dose of BMZ due tonight, anticipating increasing hyperglycemia following this dose.    Plan:    -NPH at bedtime:  increase to 60 units with second dose BMZ for tonight.   -anticipating she will require NPH insulin for tomorrow morning, due to steroid effect- dose TBD    -add aspart CHO coverage with meals and snacks, start at 1:10g with lunch and reassess later today   -adjusted to OB custom sliding scale (1/50>100 AC, and >120 HS)   -BG monitoring TID AC, HS, 0200, and 2 hour PP   -hypoglycemia protocol   -she is to walk ~10 minutes following meals, if ok to ambulate per OBGYN   -consistent carb diet with carb counting protocol   -diabetes education- needs not identified this admission.   -on discharge, will recommend outpatient follow up with endocrinology service (has appt scheduled with Mallika Borja PA-C 12/19/19)    For Discharge:   -will likely require BID dosing of NPH through washout of BMZ. Doses TBD tomorrow for possible discharge. Once BMZ effect worn off ~12/5-12/6, should be able to return to her PTA dosing until follow up.     Discussed plan of care with patient, nursing.   Thank you for this consult; Inpatient Diabetes will continue to follow.     Diabetes Management Team job code: [id number]    I spent a total of 80 minutes bedside and on the inpatient unit managing glycemic care. Over 50% of my time on the unit was spent counseling the patient and/or coordinating care regarding acute hyperglycemia management.  See note for details.    Traci Byrne PA-C  Inpatient Diabetes Management Service  Pager [phone]
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
## 2019-12-04 ENCOUNTER — VIRTUAL VISIT (OUTPATIENT)
Dept: INTERPRETER SERVICES | Facility: CLINIC | Age: 39
End: 2019-12-04

## 2019-12-04 ENCOUNTER — OFFICE VISIT (OUTPATIENT)
Dept: INTERPRETER SERVICES | Facility: CLINIC | Age: 39
End: 2019-12-04
Payer: COMMERCIAL

## 2019-12-04 ENCOUNTER — HOSPITAL ENCOUNTER (INPATIENT)
Dept: ULTRASOUND IMAGING | Facility: CLINIC | Age: 39
End: 2019-12-04
Attending: OBSTETRICS & GYNECOLOGY | Admitting: OBSTETRICS & GYNECOLOGY
Payer: COMMERCIAL

## 2019-12-04 PROBLEM — O36.5931: Status: ACTIVE | Noted: 2019-12-04

## 2019-12-04 PROBLEM — Z3A.28 28 WEEKS GESTATION OF PREGNANCY: Status: ACTIVE | Noted: 2019-12-04

## 2019-12-04 LAB
GLUCOSE BLDC GLUCOMTR-MCNC: 101 MG/DL (ref 70–99)
GLUCOSE BLDC GLUCOMTR-MCNC: 106 MG/DL (ref 70–99)
GLUCOSE BLDC GLUCOMTR-MCNC: 108 MG/DL (ref 70–99)
GLUCOSE BLDC GLUCOMTR-MCNC: 113 MG/DL (ref 70–99)
GLUCOSE BLDC GLUCOMTR-MCNC: 127 MG/DL (ref 70–99)
GLUCOSE BLDC GLUCOMTR-MCNC: 132 MG/DL (ref 70–99)
GLUCOSE BLDC GLUCOMTR-MCNC: 190 MG/DL (ref 70–99)
GLUCOSE BLDC GLUCOMTR-MCNC: 95 MG/DL (ref 70–99)

## 2019-12-04 PROCEDURE — 12000001 ZZH R&B MED SURG/OB UMMC

## 2019-12-04 PROCEDURE — G0378 HOSPITAL OBSERVATION PER HR: HCPCS

## 2019-12-04 PROCEDURE — 25000132 ZZH RX MED GY IP 250 OP 250 PS 637: Performed by: STUDENT IN AN ORGANIZED HEALTH CARE EDUCATION/TRAINING PROGRAM

## 2019-12-04 PROCEDURE — T1013 SIGN LANG/ORAL INTERPRETER: HCPCS | Mod: U3

## 2019-12-04 PROCEDURE — 90746 HEPB VACCINE 3 DOSE ADULT IM: CPT | Performed by: STUDENT IN AN ORGANIZED HEALTH CARE EDUCATION/TRAINING PROGRAM

## 2019-12-04 PROCEDURE — 76819 FETAL BIOPHYS PROFIL W/O NST: CPT

## 2019-12-04 PROCEDURE — 00000146 ZZHCL STATISTIC GLUCOSE BY METER IP

## 2019-12-04 PROCEDURE — 76820 UMBILICAL ARTERY ECHO: CPT | Performed by: OBSTETRICS & GYNECOLOGY

## 2019-12-04 PROCEDURE — 25000128 H RX IP 250 OP 636: Performed by: STUDENT IN AN ORGANIZED HEALTH CARE EDUCATION/TRAINING PROGRAM

## 2019-12-04 PROCEDURE — 90715 TDAP VACCINE 7 YRS/> IM: CPT | Performed by: STUDENT IN AN ORGANIZED HEALTH CARE EDUCATION/TRAINING PROGRAM

## 2019-12-04 RX ADMIN — CLOSTRIDIUM TETANI TOXOID ANTIGEN (FORMALDEHYDE INACTIVATED), CORYNEBACTERIUM DIPHTHERIAE TOXOID ANTIGEN (FORMALDEHYDE INACTIVATED), BORDETELLA PERTUSSIS TOXOID ANTIGEN (GLUTARALDEHYDE INACTIVATED), BORDETELLA PERTUSSIS FILAMENTOUS HEMAGGLUTININ ANTIGEN (FORMALDEHYDE INACTIVATED), BORDETELLA PERTUSSIS PERTACTIN ANTIGEN, AND BORDETELLA PERTUSSIS FIMBRIAE 2/3 ANTIGEN 0.5 ML: 5; 2; 2.5; 5; 3; 5 INJECTION, SUSPENSION INTRAMUSCULAR at 09:06

## 2019-12-04 RX ADMIN — DOCUSATE SODIUM 100 MG: 100 CAPSULE, LIQUID FILLED ORAL at 08:10

## 2019-12-04 RX ADMIN — METFORMIN HYDROCHLORIDE 1000 MG: 500 TABLET, FILM COATED ORAL at 08:10

## 2019-12-04 RX ADMIN — DOCUSATE SODIUM 100 MG: 100 CAPSULE, LIQUID FILLED ORAL at 20:03

## 2019-12-04 RX ADMIN — ASPIRIN 81 MG CHEWABLE TABLET 162 MG: 81 TABLET CHEWABLE at 08:10

## 2019-12-04 RX ADMIN — PRENATAL VIT W/ FE FUMARATE-FA TAB 27-0.8 MG 1 TABLET: 27-0.8 TAB at 08:10

## 2019-12-04 RX ADMIN — METFORMIN HYDROCHLORIDE 1000 MG: 500 TABLET, FILM COATED ORAL at 18:21

## 2019-12-04 RX ADMIN — HEPATITIS B VACCINE (RECOMBINANT) 20 MCG: 20 INJECTION, SUSPENSION INTRAMUSCULAR at 09:05

## 2019-12-04 NOTE — PLAN OF CARE
Pt comfortable overnight, denies contractions, LOF or VB.  Reports occ 'tightening' that is not painful.  Reports fetal movement earlier in the night but says 'baby is sleeping now.'  Blood glucose monitored per orders.  FHT category 2 this am (see flowsheets).  Will continue with current plan of care.

## 2019-12-04 NOTE — PROVIDER NOTIFICATION
12/04/19 1605   Provider Notification   Provider Name/Title Dr. Alves   Method of Notification In Department   Notification Reason Other (Comment)     Reviewed monitor strip with provider, ok to take patient off.

## 2019-12-04 NOTE — PLAN OF CARE
"VSS. 2hr pp for lunch was 125. Pre-dinner , carb coverage and sliding scale insulin given per orders. 2hr pp  for dinner was 120. Bedtime insulin was 161. When this writer told the patient about her elevated blood glucose the patient said, \"Oh, could it be the cheetos I ate.\" This writer explained that all snacks need to be covered, pt agreed to notify RN's of snacks going forward. Bedtime BG covered per orders. FHR continues to have minimal variability, see flowsheets for details. No ctx on monitor.  Will continue with current plan of care.   "

## 2019-12-04 NOTE — PROGRESS NOTES
"CLINICAL NUTRITION SERVICES - ASSESSMENT NOTE     Nutrition Prescription    RECOMMENDATIONS FOR MDs/PROVIDERS TO ORDER:  None at this time    Malnutrition Status:    Patient does not meet two of the established criteria necessary for diagnosing malnutrition    Recommendations already ordered by Registered Dietitian (RD):  None at this time    Future/Additional Recommendations:  If PO intake declines, consider ordering supplements   Monitor for signs of menu fatigue      REASON FOR ASSESSMENT  Yanet Reyes Avila is a/an 39 year old female assessed by the dietitian for Admission Nutrition Risk Screen for new/uncontrolled diabetes    NUTRITION HISTORY  Per chart review:   @ 28w1d   PMH of Type II DM.    Pt reports eating 3-4 meals per day. For breakfast the pt may have a shake and/or quesadilla. For lunch she may have a fried egg with rice with tortillas. Dinner is a sandwich with ham and martínez. The denies snacking. The pt pregnancy, the pt has noticed some \"cravings\" like chocolate but she has tried to keep this consumption under control. Marni also noted that she is followed by an Endo team at [facility] and has spoken with a Diabetes Educator.     CURRENT NUTRITION ORDERS  Diet: Moderate Consistent Carbohydrate, room service  Intake/Tolerance: Per [employer] the pt is ordering TID meals, totaling >2000 kcal/day and >100 g protein/day. Pt reports that her appetite has been good but she has been somnolent and sleep through some \"normal\" meals times.    A diabetes education has been consulted.     LABS  Labs reviewed   Ref. Range 12/3/2019 20:45 12/3/2019 22:46 2019 02:52 2019 06:52 2019 09:02   Glucose Latest Ref Range: 70 - 99 mg/dL 120 (H) 161 (H) 190 (H) 132 (H) 127 (H)       MEDICATIONS  Medications reviewed  Scheduled bowel regimen  Novolog - correction scale  Novolog 1 unit per 10 grams CHO  Humulin Pen  Metformin  Prenatal Multivitamin with iron     ANTHROPOMETRICS  Height:  Ht Readings from Last " "1 Encounters:   10/22/19 1.626 m (5' 4\")   Prepregnancy wt: 77.3 kg (170#) - per chart review/care everywhere   Most Recent Weight: 84.4 kg (186 lb)    IBW: 54.5 kg (142%)   Prepregnancy BMI: 29.2 Overweight BMI 25-29.9  Weight History: The pt has gained 16# so far this pregnancy. With pt's prepregnancy BMI it is recommended that she gain a total of 15-25#.  Wt Readings from Last 15 Encounters:   12/03/19 84.4 kg (186 lb)   11/25/19 85 kg (187 lb 8 oz)   11/11/19 84.9 kg (187 lb 3.2 oz)   10/22/19 82.4 kg (181 lb 9.6 oz)   10/08/19 81.1 kg (178 lb 12.8 oz)   09/12/19 79.7 kg (175 lb 9.6 oz)   09/10/19 80.2 kg (176 lb 14.4 oz)   08/19/19 78.7 kg (173 lb 8 oz)   07/19/19 78.5 kg (173 lb)   07/05/19 78.5 kg (173 lb)   01/14/19  77.6 kg (171 lb) CE   06/15/18 76.2 kg (168 lb)     Dosing Weight: 60 kg (adjusted based on Prepregnancy IBW of 54.5 kg and prepregnancy wt of 77.3 kg)     ASSESSED NUTRITION NEEDS  Estimated Energy Needs: 7498-4114 kcals/day (25 - 30 kcals/kg +452)  Justification: 3rd trimester   Estimated Protein Needs: 66-84 grams protein/day (1.1 - 1.4 grams of pro/kg)  Justification: 3rd trimester   Estimated Fluid Needs: 3 L/day   Justification: 3rd trimester     PHYSICAL FINDINGS  See malnutrition section below.    MALNUTRITION  % Intake: No decreased intake noted  % Weight Loss: None noted  Subcutaneous Fat Loss: None observed  Muscle Loss: None observed  Fluid Accumulation/Edema: None noted  Malnutrition Diagnosis: Patient does not meet two of the established criteria necessary for diagnosing malnutrition    NUTRITION DIAGNOSIS  No nutrition diagnosis at this time    INTERVENTIONS  Implementation  - Nutrition Education: Provided education on the importance of adequate but not excessive PO intake during pregnancy. Reviewed and provided the following handout: Type 2 DM Nutrition Education. Reviewed CHO containing food and discussed the principles of My Plate.      Goals  Patient to consume % of " nutritionally adequate meal trays TID, or the equivalent with supplements/snacks.     Monitoring/Evaluation  No nutrition concerns identified at this time. Will continue to follow pt via chart check every 14 days. Please re-consult if further needs arise.       Linda Tovar RD, LD  Pager: [phone]

## 2019-12-04 NOTE — UTILIZATION REVIEW
"Admission Status; Secondary Review Determination     Under the authority of the Utilization Management Committee, the utilization review process indicated a secondary review on the above patient. The review outcome is based on review of the medical records, discussions with staff, and applying clinical experience noted on the date of the review.     (X) Observation Status Appropriate - This patient does not meet hospital inpatient criteria and is placed in observation status. If this patient's primary payer is Medicare and was admitted as an inpatient, Condition Code 44 should be used and patient status changed to \"observation\".     RATIONALE FOR DETERMINATION:  40 yo female  at 28w0d admitted secondary to a non-reassuring BPP in the setting of unexplained severe IUGR and anhydramnios. The patient has had FGR and anhydramnios since 20-22 weeks.  The patient was admitted for serial  testing and betamethsone injection.  Possible discharge tomorrow assuming continued reassuring fetal monitoring.      The severity of illness, intensity of service provided, expected LOS and risk for adverse outcome make the care appropriate for further observation; however, doesn't meet criteria for hospital inpatient admission. I attempted to communicate this to the resident OB/GYN physician caring for this patient today.      The information on this document is developed by the utilization review team in order for the business office to ensure compliance. This only denotes the appropriateness of proper admission status and does not reflect the quality of care rendered.     The definitions of Inpatient Status and Observation Status used in making the determination above are those provided in the CMS Coverage Manual, Chapter 1 and Chapter 6, section 70.4.     Sincerely,     Leonel King  Physician Advisor  Utilization Management  [facility]  "

## 2019-12-04 NOTE — PROGRESS NOTES
IP Diabetes Management  Daily Note           Assessment and Plan:   HPI: Marni is a 39 year old female currently at 28 weeks who was admitted 12/2/19 for betamethasone (BMZ). She has a history of well controlled GDM with use of insulin in pregnancy. Experiencing steroid hyperglycemia.       Assessment:   1) Underlying TIIDM in high risk pregnancy, with anhydraminos, breech presentation  2) Steroid induced hyperglycemia      Plan:    -added NPH 13 units AM this morning, and continue likely through 12/5.   -continue NPH 60 units this evening- possibly reduce back to PTA 55 units 12/5.   -continue aspart CHO coverage through today (reduced to 1:12g CHO with lunch and dinner), and discontinue for tomorrow.    -increased to aspart 1/20 intensity sliding scale >100 TID AC and >120 HS   -BG monitoring TID AC, HS, 0200   -hypoglycemia protocol   -mod CHO diet with carb counting protocol   -diabetes education: needs not identified this admission.    Outpatient follow up: with [facility] endocrinology service (has appt scheduled with Mallika Borja PA-C 12/19/19)    Plan discussed with patient, bedside RN. Primary team paged.     Interval History and Assessment: interval glucose trend reviewed:   Second and final dose of BMZ on board as of last night.   HS BG to 161, and 190 at 0300; patient did not cover cheetos with aspart.   Fasting glucose slightly above target at 127.     Abnormal dopplers on fetal US this morning; will remain hospitalized for further monitoring. She is concerned about baby.     Current nutritional intake and type: Orders Placed This Encounter      Moderate Consistent CHO Diet      PTA Diabetes Regimen:   Pre-pregnancy:   Metformin 1000mg BID  During pregnancy:   Metformin 1000mg BID  NPH at HS, currently at 55 units    Discharge Planning: per primary team   We will continue to follow.            Diabetes History:   Type of Diabetes: Type 2 Diabetes Mellitus, Gestational Diabetes Mellitus  Lab Results    Component Value Date    A1C 8.2 07/05/2019              Review of Systems:   The Review of Systems is negative other than noted in the Interval History.           Medications:     Current Facility-Administered Medications   Medication     acetaminophen (TYLENOL) tablet 975 mg     aspirin (ASA) chewable tablet 162 mg     bisacodyl (DULCOLAX) Suppository 10 mg     glucose gel 15-30 g    Or     dextrose 50 % injection 25-50 mL    Or     glucagon injection 1 mg     docusate sodium (COLACE) capsule 100 mg     hepatitis B vaccine (ENGERIX-B) injection 20 mcg     insulin aspart (NovoLOG) inj (RAPID ACTING)     insulin aspart (NovoLOG) inj (RAPID ACTING)     insulin aspart (NovoLOG) inj (RAPID ACTING)     insulin aspart (NovoLOG) inj (RAPID ACTING)     insulin aspart (NovoLOG) inj (RAPID ACTING)     insulin aspart (NovoLOG) inj (RAPID ACTING)     insulin isophane human (HumuLIN N PEN) injection 13 Units     insulin isophane human (HumuLIN N PEN) injection 65 Units     metFORMIN (GLUCOPHAGE) tablet 1,000 mg     ondansetron (ZOFRAN) injection 4 mg     prenatal multivitamin w/iron per tablet 1 tablet     sodium phosphate (FLEET ENEMA) 1 enema     Tdap (tetanus-diphtheria-acell pertussis) (ADACEL) injection 0.5 mL            Physical Exam:    /71   Pulse 86   Temp 97.9  F (36.6  C) (Oral)   Resp 18   Wt 84.4 kg (186 lb)   LMP 05/11/2019   Breastfeeding No   BMI 31.93 kg/m    General: pleasant, in no distress. Sister at bedside sleeping.    HEENT: normocephalic, atraumatic. Oral mucous membranes moist.   Lungs: unlabored respiration, no cough  ABD: rounded, soft, no lipodystrophy noted  Skin: warm and dry, no obvious lesions  MSK:  moves all extremities  Lymp:  no LE edema   Mental status:  alert, oriented to self, place, time  Psych:  affect, calm and appropriate interaction             Data:     Recent Labs   Lab 12/04/19  0902 12/04/19  0652 12/04/19  0252 12/03/19  2246 12/03/19  2045 12/03/19  1832   12/02/19 1940   GLC  --   --   --   --   --   --   --  88   * 132* 190* 161* 120* 126*   < >  --     < > = values in this interval not displayed.     Lab Results   Component Value Date    WBC 10.2 12/02/2019    WBC 8.9 07/05/2019    HGB 11.8 12/02/2019    HGB 12.9 07/05/2019    HCT 34.9 (L) 12/02/2019    HCT 38.9 07/05/2019    MCV 91 12/02/2019    MCV 89 07/05/2019     12/02/2019     07/05/2019     Lab Results   Component Value Date     12/02/2019    POTASSIUM 3.5 12/02/2019    CHLORIDE 109 12/02/2019    CO2 21 12/02/2019    GLC 88 12/02/2019     Lab Results   Component Value Date    BUN 8 12/02/2019     Lab Results   Component Value Date    TSH 1.63 07/19/2019     Lab Results   Component Value Date    AST 14 12/02/2019    AST 30 07/19/2019    ALT 23 12/02/2019    ALT 65 (H) 07/19/2019    ALKPHOS 81 12/02/2019       Diabetes Management Team job code: [id number]  I spent a total of 25 minutes bedside and on the inpatient unit managing glycemic care. Over 50% of my time on the unit was spent counseling the patient and/or coordinating care regarding acute hyperglycemia management.  See note for details.    Traci Byrne PA-C  Inpatient Diabetes Management Service  Pager [phone]
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
## 2019-12-05 ENCOUNTER — HOSPITAL ENCOUNTER (INPATIENT)
Dept: ULTRASOUND IMAGING | Facility: CLINIC | Age: 39
End: 2019-12-05
Attending: OBSTETRICS & GYNECOLOGY | Admitting: OBSTETRICS & GYNECOLOGY
Payer: COMMERCIAL

## 2019-12-05 ENCOUNTER — ANESTHESIA EVENT (OUTPATIENT)
Dept: OBGYN | Facility: CLINIC | Age: 39
End: 2019-12-05
Payer: COMMERCIAL

## 2019-12-05 ENCOUNTER — ANESTHESIA (OUTPATIENT)
Dept: OBGYN | Facility: CLINIC | Age: 39
End: 2019-12-05
Payer: COMMERCIAL

## 2019-12-05 PROBLEM — Z98.891 S/P C-SECTION: Status: ACTIVE | Noted: 2019-12-05

## 2019-12-05 LAB
ABO + RH BLD: NORMAL
ABO + RH BLD: NORMAL
ALBUMIN UR-MCNC: NEGATIVE MG/DL
APPEARANCE UR: CLEAR
BACTERIA #/AREA URNS HPF: ABNORMAL /HPF
BILIRUB UR QL STRIP: NEGATIVE
BLD GP AB SCN SERPL QL: NORMAL
BLOOD BANK CMNT PATIENT-IMP: NORMAL
COLOR UR AUTO: ABNORMAL
CREAT UR-MCNC: 22 MG/DL
ERYTHROCYTE [DISTWIDTH] IN BLOOD BY AUTOMATED COUNT: 13.3 % (ref 10–15)
GLUCOSE BLDC GLUCOMTR-MCNC: 101 MG/DL (ref 70–99)
GLUCOSE BLDC GLUCOMTR-MCNC: 54 MG/DL (ref 70–99)
GLUCOSE BLDC GLUCOMTR-MCNC: 55 MG/DL (ref 70–99)
GLUCOSE BLDC GLUCOMTR-MCNC: 73 MG/DL (ref 70–99)
GLUCOSE BLDC GLUCOMTR-MCNC: 86 MG/DL (ref 70–99)
GLUCOSE BLDC GLUCOMTR-MCNC: 88 MG/DL (ref 70–99)
GLUCOSE UR STRIP-MCNC: NEGATIVE MG/DL
HCT VFR BLD AUTO: 35.6 % (ref 35–47)
HGB BLD-MCNC: 11.9 G/DL (ref 11.7–15.7)
HGB UR QL STRIP: NEGATIVE
KETONES UR STRIP-MCNC: NEGATIVE MG/DL
LEUKOCYTE ESTERASE UR QL STRIP: NEGATIVE
MCH RBC QN AUTO: 30.7 PG (ref 26.5–33)
MCHC RBC AUTO-ENTMCNC: 33.4 G/DL (ref 31.5–36.5)
MCV RBC AUTO: 92 FL (ref 78–100)
NITRATE UR QL: NEGATIVE
PH UR STRIP: 6.5 PH (ref 5–7)
PLATELET # BLD AUTO: 214 10E9/L (ref 150–450)
PROT UR-MCNC: 0.07 G/L
PROT/CREAT 24H UR: 0.31 G/G CR (ref 0–0.2)
RBC # BLD AUTO: 3.88 10E12/L (ref 3.8–5.2)
RBC #/AREA URNS AUTO: <1 /HPF (ref 0–2)
SOURCE: ABNORMAL
SP GR UR STRIP: 1.01 (ref 1–1.03)
SPECIMEN EXP DATE BLD: NORMAL
SQUAMOUS #/AREA URNS AUTO: <1 /HPF (ref 0–1)
UROBILINOGEN UR STRIP-MCNC: NORMAL MG/DL (ref 0–2)
WBC # BLD AUTO: 14 10E9/L (ref 4–11)
WBC #/AREA URNS AUTO: <1 /HPF (ref 0–5)

## 2019-12-05 PROCEDURE — 25000128 H RX IP 250 OP 636: Performed by: STUDENT IN AN ORGANIZED HEALTH CARE EDUCATION/TRAINING PROGRAM

## 2019-12-05 PROCEDURE — 36415 COLL VENOUS BLD VENIPUNCTURE: CPT | Performed by: STUDENT IN AN ORGANIZED HEALTH CARE EDUCATION/TRAINING PROGRAM

## 2019-12-05 PROCEDURE — 86900 BLOOD TYPING SEROLOGIC ABO: CPT | Performed by: STUDENT IN AN ORGANIZED HEALTH CARE EDUCATION/TRAINING PROGRAM

## 2019-12-05 PROCEDURE — 71000014 ZZH RECOVERY PHASE 1 LEVEL 2 FIRST HR: Performed by: OBSTETRICS & GYNECOLOGY

## 2019-12-05 PROCEDURE — 86850 RBC ANTIBODY SCREEN: CPT | Performed by: STUDENT IN AN ORGANIZED HEALTH CARE EDUCATION/TRAINING PROGRAM

## 2019-12-05 PROCEDURE — 84156 ASSAY OF PROTEIN URINE: CPT | Performed by: STUDENT IN AN ORGANIZED HEALTH CARE EDUCATION/TRAINING PROGRAM

## 2019-12-05 PROCEDURE — 12000001 ZZH R&B MED SURG/OB UMMC

## 2019-12-05 PROCEDURE — 86901 BLOOD TYPING SEROLOGIC RH(D): CPT | Performed by: STUDENT IN AN ORGANIZED HEALTH CARE EDUCATION/TRAINING PROGRAM

## 2019-12-05 PROCEDURE — 36000057 ZZH SURGERY LEVEL 3 1ST 30 MIN - UMMC: Performed by: OBSTETRICS & GYNECOLOGY

## 2019-12-05 PROCEDURE — 00000146 ZZHCL STATISTIC GLUCOSE BY METER IP

## 2019-12-05 PROCEDURE — 37000008 ZZH ANESTHESIA TECHNICAL FEE, 1ST 30 MIN: Performed by: OBSTETRICS & GYNECOLOGY

## 2019-12-05 PROCEDURE — 88307 TISSUE EXAM BY PATHOLOGIST: CPT | Mod: 26 | Performed by: STUDENT IN AN ORGANIZED HEALTH CARE EDUCATION/TRAINING PROGRAM

## 2019-12-05 PROCEDURE — 76819 FETAL BIOPHYS PROFIL W/O NST: CPT

## 2019-12-05 PROCEDURE — 25800030 ZZH RX IP 258 OP 636: Performed by: STUDENT IN AN ORGANIZED HEALTH CARE EDUCATION/TRAINING PROGRAM

## 2019-12-05 PROCEDURE — 27110028 ZZH OR GENERAL SUPPLY NON-STERILE: Performed by: OBSTETRICS & GYNECOLOGY

## 2019-12-05 PROCEDURE — 00000159 ZZHCL STATISTIC H-SEND OUTS PREP: Performed by: STUDENT IN AN ORGANIZED HEALTH CARE EDUCATION/TRAINING PROGRAM

## 2019-12-05 PROCEDURE — 25000125 ZZHC RX 250: Performed by: STUDENT IN AN ORGANIZED HEALTH CARE EDUCATION/TRAINING PROGRAM

## 2019-12-05 PROCEDURE — 25000132 ZZH RX MED GY IP 250 OP 250 PS 637: Performed by: STUDENT IN AN ORGANIZED HEALTH CARE EDUCATION/TRAINING PROGRAM

## 2019-12-05 PROCEDURE — C1765 ADHESION BARRIER: HCPCS | Performed by: OBSTETRICS & GYNECOLOGY

## 2019-12-05 PROCEDURE — C9290 INJ, BUPIVACAINE LIPOSOME: HCPCS | Performed by: STUDENT IN AN ORGANIZED HEALTH CARE EDUCATION/TRAINING PROGRAM

## 2019-12-05 PROCEDURE — 76820 UMBILICAL ARTERY ECHO: CPT | Performed by: OBSTETRICS & GYNECOLOGY

## 2019-12-05 PROCEDURE — 85027 COMPLETE CBC AUTOMATED: CPT | Performed by: STUDENT IN AN ORGANIZED HEALTH CARE EDUCATION/TRAINING PROGRAM

## 2019-12-05 PROCEDURE — 40000170 ZZH STATISTIC PRE-PROCEDURE ASSESSMENT II: Performed by: OBSTETRICS & GYNECOLOGY

## 2019-12-05 PROCEDURE — 81001 URINALYSIS AUTO W/SCOPE: CPT | Performed by: STUDENT IN AN ORGANIZED HEALTH CARE EDUCATION/TRAINING PROGRAM

## 2019-12-05 PROCEDURE — 27210794 ZZH OR GENERAL SUPPLY STERILE: Performed by: OBSTETRICS & GYNECOLOGY

## 2019-12-05 PROCEDURE — 36000059 ZZH SURGERY LEVEL 3 EA 15 ADDTL MIN UMMC: Performed by: OBSTETRICS & GYNECOLOGY

## 2019-12-05 PROCEDURE — 37000009 ZZH ANESTHESIA TECHNICAL FEE, EACH ADDTL 15 MIN: Performed by: OBSTETRICS & GYNECOLOGY

## 2019-12-05 PROCEDURE — 25000128 H RX IP 250 OP 636

## 2019-12-05 PROCEDURE — 88307 TISSUE EXAM BY PATHOLOGIST: CPT | Performed by: STUDENT IN AN ORGANIZED HEALTH CARE EDUCATION/TRAINING PROGRAM

## 2019-12-05 PROCEDURE — 25800030 ZZH RX IP 258 OP 636

## 2019-12-05 PROCEDURE — 71000015 ZZH RECOVERY PHASE 1 LEVEL 2 EA ADDTL HR: Performed by: OBSTETRICS & GYNECOLOGY

## 2019-12-05 RX ORDER — PHENYLEPHRINE HCL IN 0.9% NACL 1 MG/10 ML
SYRINGE (ML) INTRAVENOUS CONTINUOUS PRN
Status: DISCONTINUED | OUTPATIENT
Start: 2019-12-05 | End: 2019-12-05

## 2019-12-05 RX ORDER — MAGNESIUM SULFATE IN WATER 40 MG/ML
INJECTION, SOLUTION INTRAVENOUS
Status: COMPLETED
Start: 2019-12-05 | End: 2019-12-05

## 2019-12-05 RX ORDER — OXYTOCIN/0.9 % SODIUM CHLORIDE 30/500 ML
340 PLASTIC BAG, INJECTION (ML) INTRAVENOUS CONTINUOUS PRN
Status: DISCONTINUED | OUTPATIENT
Start: 2019-12-05 | End: 2019-12-08 | Stop reason: HOSPADM

## 2019-12-05 RX ORDER — DIPHENHYDRAMINE HYDROCHLORIDE 50 MG/ML
25 INJECTION INTRAMUSCULAR; INTRAVENOUS EVERY 6 HOURS PRN
Status: DISCONTINUED | OUTPATIENT
Start: 2019-12-05 | End: 2019-12-08 | Stop reason: HOSPADM

## 2019-12-05 RX ORDER — IBUPROFEN 600 MG/1
600 TABLET, FILM COATED ORAL EVERY 6 HOURS
Status: DISCONTINUED | OUTPATIENT
Start: 2019-12-05 | End: 2019-12-08 | Stop reason: HOSPADM

## 2019-12-05 RX ORDER — KETOROLAC TROMETHAMINE 30 MG/ML
30 INJECTION, SOLUTION INTRAMUSCULAR; INTRAVENOUS EVERY 6 HOURS
Status: DISPENSED | OUTPATIENT
Start: 2019-12-05 | End: 2019-12-06

## 2019-12-05 RX ORDER — NALBUPHINE HYDROCHLORIDE 10 MG/ML
2.5-5 INJECTION, SOLUTION INTRAMUSCULAR; INTRAVENOUS; SUBCUTANEOUS EVERY 6 HOURS PRN
Status: DISCONTINUED | OUTPATIENT
Start: 2019-12-05 | End: 2019-12-05

## 2019-12-05 RX ORDER — MORPHINE SULFATE 1 MG/ML
INJECTION, SOLUTION EPIDURAL; INTRATHECAL; INTRAVENOUS PRN
Status: DISCONTINUED | OUTPATIENT
Start: 2019-12-05 | End: 2019-12-05

## 2019-12-05 RX ORDER — OXYTOCIN/0.9 % SODIUM CHLORIDE 30/500 ML
PLASTIC BAG, INJECTION (ML) INTRAVENOUS CONTINUOUS PRN
Status: DISCONTINUED | OUTPATIENT
Start: 2019-12-05 | End: 2019-12-05

## 2019-12-05 RX ORDER — OXYTOCIN/0.9 % SODIUM CHLORIDE 30/500 ML
100 PLASTIC BAG, INJECTION (ML) INTRAVENOUS CONTINUOUS
Status: DISCONTINUED | OUTPATIENT
Start: 2019-12-05 | End: 2019-12-08 | Stop reason: HOSPADM

## 2019-12-05 RX ORDER — SODIUM CHLORIDE 9 MG/ML
INJECTION, SOLUTION INTRAVENOUS CONTINUOUS
Status: DISCONTINUED | OUTPATIENT
Start: 2019-12-05 | End: 2019-12-05

## 2019-12-05 RX ORDER — BISACODYL 10 MG
10 SUPPOSITORY, RECTAL RECTAL DAILY PRN
Status: DISCONTINUED | OUTPATIENT
Start: 2019-12-07 | End: 2019-12-08 | Stop reason: HOSPADM

## 2019-12-05 RX ORDER — KETOROLAC TROMETHAMINE 30 MG/ML
INJECTION, SOLUTION INTRAMUSCULAR; INTRAVENOUS PRN
Status: DISCONTINUED | OUTPATIENT
Start: 2019-12-05 | End: 2019-12-05

## 2019-12-05 RX ORDER — CITRIC ACID/SODIUM CITRATE 334-500MG
SOLUTION, ORAL ORAL
Status: DISCONTINUED
Start: 2019-12-05 | End: 2019-12-05 | Stop reason: HOSPADM

## 2019-12-05 RX ORDER — CALCIUM GLUCONATE 94 MG/ML
1 INJECTION, SOLUTION INTRAVENOUS
Status: DISCONTINUED | OUTPATIENT
Start: 2019-12-05 | End: 2019-12-05

## 2019-12-05 RX ORDER — SODIUM CHLORIDE, SODIUM LACTATE, POTASSIUM CHLORIDE, CALCIUM CHLORIDE 600; 310; 30; 20 MG/100ML; MG/100ML; MG/100ML; MG/100ML
INJECTION, SOLUTION INTRAVENOUS CONTINUOUS
Status: DISCONTINUED | OUTPATIENT
Start: 2019-12-05 | End: 2019-12-05 | Stop reason: HOSPADM

## 2019-12-05 RX ORDER — NALOXONE HYDROCHLORIDE 0.4 MG/ML
.1-.4 INJECTION, SOLUTION INTRAMUSCULAR; INTRAVENOUS; SUBCUTANEOUS
Status: DISCONTINUED | OUTPATIENT
Start: 2019-12-05 | End: 2019-12-05

## 2019-12-05 RX ORDER — ONDANSETRON 2 MG/ML
INJECTION INTRAMUSCULAR; INTRAVENOUS PRN
Status: DISCONTINUED | OUTPATIENT
Start: 2019-12-05 | End: 2019-12-05

## 2019-12-05 RX ORDER — OXYCODONE HYDROCHLORIDE 5 MG/1
5-10 TABLET ORAL
Status: DISCONTINUED | OUTPATIENT
Start: 2019-12-05 | End: 2019-12-08 | Stop reason: HOSPADM

## 2019-12-05 RX ORDER — HYDROCORTISONE 2.5 %
CREAM (GRAM) TOPICAL 3 TIMES DAILY PRN
Status: DISCONTINUED | OUTPATIENT
Start: 2019-12-05 | End: 2019-12-08 | Stop reason: HOSPADM

## 2019-12-05 RX ORDER — ONDANSETRON 2 MG/ML
4 INJECTION INTRAMUSCULAR; INTRAVENOUS EVERY 6 HOURS PRN
Status: DISCONTINUED | OUTPATIENT
Start: 2019-12-05 | End: 2019-12-08 | Stop reason: HOSPADM

## 2019-12-05 RX ORDER — METOCLOPRAMIDE HYDROCHLORIDE 5 MG/ML
10 INJECTION INTRAMUSCULAR; INTRAVENOUS EVERY 6 HOURS PRN
Status: DISCONTINUED | OUTPATIENT
Start: 2019-12-05 | End: 2019-12-08 | Stop reason: HOSPADM

## 2019-12-05 RX ORDER — ACETAMINOPHEN 325 MG/1
650 TABLET ORAL EVERY 6 HOURS
Status: DISCONTINUED | OUTPATIENT
Start: 2019-12-05 | End: 2019-12-08 | Stop reason: HOSPADM

## 2019-12-05 RX ORDER — LIDOCAINE 40 MG/G
CREAM TOPICAL
Status: DISCONTINUED | OUTPATIENT
Start: 2019-12-05 | End: 2019-12-05

## 2019-12-05 RX ORDER — CEFAZOLIN SODIUM 2 G/100ML
2 INJECTION, SOLUTION INTRAVENOUS
Status: COMPLETED | OUTPATIENT
Start: 2019-12-05 | End: 2019-12-05

## 2019-12-05 RX ORDER — LIDOCAINE 40 MG/G
CREAM TOPICAL
Status: DISCONTINUED | OUTPATIENT
Start: 2019-12-05 | End: 2019-12-08 | Stop reason: HOSPADM

## 2019-12-05 RX ORDER — BUPIVACAINE HYDROCHLORIDE 2.5 MG/ML
INJECTION, SOLUTION EPIDURAL; INFILTRATION; INTRACAUDAL PRN
Status: DISCONTINUED | OUTPATIENT
Start: 2019-12-05 | End: 2019-12-05

## 2019-12-05 RX ORDER — DEXTROSE MONOHYDRATE 25 G/50ML
25-50 INJECTION, SOLUTION INTRAVENOUS
Status: DISCONTINUED | OUTPATIENT
Start: 2019-12-05 | End: 2019-12-08 | Stop reason: HOSPADM

## 2019-12-05 RX ORDER — DEXTROSE, SODIUM CHLORIDE, SODIUM LACTATE, POTASSIUM CHLORIDE, AND CALCIUM CHLORIDE 5; .6; .31; .03; .02 G/100ML; G/100ML; G/100ML; G/100ML; G/100ML
INJECTION, SOLUTION INTRAVENOUS CONTINUOUS
Status: DISCONTINUED | OUTPATIENT
Start: 2019-12-05 | End: 2019-12-08 | Stop reason: HOSPADM

## 2019-12-05 RX ORDER — FENTANYL CITRATE 50 UG/ML
INJECTION, SOLUTION INTRAMUSCULAR; INTRAVENOUS PRN
Status: DISCONTINUED | OUTPATIENT
Start: 2019-12-05 | End: 2019-12-05

## 2019-12-05 RX ORDER — PROCHLORPERAZINE 25 MG
25 SUPPOSITORY, RECTAL RECTAL EVERY 12 HOURS PRN
Status: DISCONTINUED | OUTPATIENT
Start: 2019-12-05 | End: 2019-12-08 | Stop reason: HOSPADM

## 2019-12-05 RX ORDER — BUPIVACAINE HYDROCHLORIDE 7.5 MG/ML
INJECTION, SOLUTION INTRASPINAL PRN
Status: DISCONTINUED | OUTPATIENT
Start: 2019-12-05 | End: 2019-12-05

## 2019-12-05 RX ORDER — AMOXICILLIN 250 MG
1 CAPSULE ORAL 2 TIMES DAILY PRN
Status: DISCONTINUED | OUTPATIENT
Start: 2019-12-05 | End: 2019-12-08 | Stop reason: HOSPADM

## 2019-12-05 RX ORDER — CITRIC ACID/SODIUM CITRATE 334-500MG
30 SOLUTION, ORAL ORAL
Status: COMPLETED | OUTPATIENT
Start: 2019-12-05 | End: 2019-12-05

## 2019-12-05 RX ORDER — METHYLERGONOVINE MALEATE 0.2 MG/ML
200 INJECTION INTRAVENOUS
Status: DISCONTINUED | OUTPATIENT
Start: 2019-12-05 | End: 2019-12-08 | Stop reason: HOSPADM

## 2019-12-05 RX ORDER — CEFAZOLIN SODIUM 2 G/100ML
2 INJECTION, SOLUTION INTRAVENOUS
Status: DISCONTINUED | OUTPATIENT
Start: 2019-12-05 | End: 2019-12-05

## 2019-12-05 RX ORDER — SODIUM CHLORIDE, SODIUM LACTATE, POTASSIUM CHLORIDE, CALCIUM CHLORIDE 600; 310; 30; 20 MG/100ML; MG/100ML; MG/100ML; MG/100ML
INJECTION, SOLUTION INTRAVENOUS CONTINUOUS PRN
Status: DISCONTINUED | OUTPATIENT
Start: 2019-12-05 | End: 2019-12-05

## 2019-12-05 RX ORDER — LANOLIN 100 %
OINTMENT (GRAM) TOPICAL
Status: DISCONTINUED | OUTPATIENT
Start: 2019-12-05 | End: 2019-12-08 | Stop reason: HOSPADM

## 2019-12-05 RX ORDER — CEFAZOLIN SODIUM 1 G/3ML
1 INJECTION, POWDER, FOR SOLUTION INTRAMUSCULAR; INTRAVENOUS SEE ADMIN INSTRUCTIONS
Status: DISCONTINUED | OUTPATIENT
Start: 2019-12-05 | End: 2019-12-05

## 2019-12-05 RX ORDER — MAGNESIUM SULFATE IN WATER 40 MG/ML
2 INJECTION, SOLUTION INTRAVENOUS CONTINUOUS
Status: DISCONTINUED | OUTPATIENT
Start: 2019-12-05 | End: 2019-12-05

## 2019-12-05 RX ORDER — NICOTINE POLACRILEX 4 MG
15-30 LOZENGE BUCCAL
Status: DISCONTINUED | OUTPATIENT
Start: 2019-12-05 | End: 2019-12-08 | Stop reason: HOSPADM

## 2019-12-05 RX ORDER — SIMETHICONE 80 MG
80 TABLET,CHEWABLE ORAL 4 TIMES DAILY PRN
Status: DISCONTINUED | OUTPATIENT
Start: 2019-12-05 | End: 2019-12-08 | Stop reason: HOSPADM

## 2019-12-05 RX ORDER — CEFAZOLIN SODIUM 1 G/3ML
1 INJECTION, POWDER, FOR SOLUTION INTRAMUSCULAR; INTRAVENOUS SEE ADMIN INSTRUCTIONS
Status: DISCONTINUED | OUTPATIENT
Start: 2019-12-05 | End: 2019-12-05 | Stop reason: HOSPADM

## 2019-12-05 RX ORDER — CARBOPROST TROMETHAMINE 250 UG/ML
250 INJECTION, SOLUTION INTRAMUSCULAR
Status: DISCONTINUED | OUTPATIENT
Start: 2019-12-05 | End: 2019-12-08 | Stop reason: HOSPADM

## 2019-12-05 RX ORDER — MISOPROSTOL 200 UG/1
800 TABLET ORAL
Status: DISCONTINUED | OUTPATIENT
Start: 2019-12-05 | End: 2019-12-08 | Stop reason: HOSPADM

## 2019-12-05 RX ORDER — AMOXICILLIN 250 MG
2 CAPSULE ORAL 2 TIMES DAILY PRN
Status: DISCONTINUED | OUTPATIENT
Start: 2019-12-05 | End: 2019-12-08 | Stop reason: HOSPADM

## 2019-12-05 RX ORDER — NALOXONE HYDROCHLORIDE 0.4 MG/ML
.1-.4 INJECTION, SOLUTION INTRAMUSCULAR; INTRAVENOUS; SUBCUTANEOUS
Status: DISCONTINUED | OUTPATIENT
Start: 2019-12-05 | End: 2019-12-08 | Stop reason: HOSPADM

## 2019-12-05 RX ORDER — DIPHENHYDRAMINE HCL 25 MG
25 CAPSULE ORAL EVERY 6 HOURS PRN
Status: DISCONTINUED | OUTPATIENT
Start: 2019-12-05 | End: 2019-12-08 | Stop reason: HOSPADM

## 2019-12-05 RX ORDER — EPHEDRINE SULFATE 50 MG/ML
5 INJECTION, SOLUTION INTRAMUSCULAR; INTRAVENOUS; SUBCUTANEOUS
Status: DISCONTINUED | OUTPATIENT
Start: 2019-12-05 | End: 2019-12-05

## 2019-12-05 RX ORDER — OXYTOCIN 10 [USP'U]/ML
10 INJECTION, SOLUTION INTRAMUSCULAR; INTRAVENOUS
Status: DISCONTINUED | OUTPATIENT
Start: 2019-12-05 | End: 2019-12-08 | Stop reason: HOSPADM

## 2019-12-05 RX ADMIN — MAGNESIUM SULFATE IN WATER 2 G/HR: 40 INJECTION, SOLUTION INTRAVENOUS at 09:26

## 2019-12-05 RX ADMIN — OXYTOCIN-SODIUM CHLORIDE 0.9% IV SOLN 30 UNIT/500ML 300 ML/HR: 30-0.9/5 SOLUTION at 11:41

## 2019-12-05 RX ADMIN — SODIUM CHLORIDE, SODIUM LACTATE, POTASSIUM CHLORIDE, CALCIUM CHLORIDE AND DEXTROSE MONOHYDRATE: 5; 600; 310; 30; 20 INJECTION, SOLUTION INTRAVENOUS at 21:33

## 2019-12-05 RX ADMIN — Medication 6 G: at 08:53

## 2019-12-05 RX ADMIN — BUPIVACAINE HYDROCHLORIDE 20 ML: 2.5 INJECTION, SOLUTION EPIDURAL; INFILTRATION; INTRACAUDAL at 13:20

## 2019-12-05 RX ADMIN — SENNOSIDES AND DOCUSATE SODIUM 1 TABLET: 8.6; 5 TABLET ORAL at 21:40

## 2019-12-05 RX ADMIN — SODIUM CHLORIDE, SODIUM LACTATE, POTASSIUM CHLORIDE, CALCIUM CHLORIDE: 600; 310; 30; 20 INJECTION, SOLUTION INTRAVENOUS at 11:09

## 2019-12-05 RX ADMIN — SODIUM CHLORIDE, SODIUM LACTATE, POTASSIUM CHLORIDE, CALCIUM CHLORIDE: 600; 310; 30; 20 INJECTION, SOLUTION INTRAVENOUS at 12:33

## 2019-12-05 RX ADMIN — CEFAZOLIN SODIUM 2 G: 2 INJECTION, SOLUTION INTRAVENOUS at 11:23

## 2019-12-05 RX ADMIN — MORPHINE SULFATE 0.15 MG: 1 INJECTION, SOLUTION EPIDURAL; INTRATHECAL; INTRAVENOUS at 11:17

## 2019-12-05 RX ADMIN — FENTANYL CITRATE 15 MCG: 50 INJECTION, SOLUTION INTRAMUSCULAR; INTRAVENOUS at 11:17

## 2019-12-05 RX ADMIN — Medication 75 MCG/MIN: at 11:19

## 2019-12-05 RX ADMIN — SODIUM CITRATE AND CITRIC ACID MONOHYDRATE 30 ML: 500; 334 SOLUTION ORAL at 10:48

## 2019-12-05 RX ADMIN — ONDANSETRON 4 MG: 2 INJECTION INTRAMUSCULAR; INTRAVENOUS at 12:00

## 2019-12-05 RX ADMIN — SODIUM CHLORIDE, POTASSIUM CHLORIDE, SODIUM LACTATE AND CALCIUM CHLORIDE: 600; 310; 30; 20 INJECTION, SOLUTION INTRAVENOUS at 10:48

## 2019-12-05 RX ADMIN — BUPIVACAINE 20 ML: 13.3 INJECTION, SUSPENSION, LIPOSOMAL INFILTRATION at 13:20

## 2019-12-05 RX ADMIN — MAGNESIUM SULFATE HEPTAHYDRATE 6 G: 500 INJECTION, SOLUTION INTRAMUSCULAR; INTRAVENOUS at 08:53

## 2019-12-05 RX ADMIN — FENTANYL CITRATE 12.5 MCG: 50 INJECTION, SOLUTION INTRAMUSCULAR; INTRAVENOUS at 12:24

## 2019-12-05 RX ADMIN — ACETAMINOPHEN 650 MG: 325 TABLET, FILM COATED ORAL at 18:20

## 2019-12-05 RX ADMIN — Medication 100 ML/HR: at 13:54

## 2019-12-05 RX ADMIN — MAGNESIUM SULFATE HEPTAHYDRATE 2 G/HR: 40 INJECTION, SOLUTION INTRAVENOUS at 09:26

## 2019-12-05 RX ADMIN — KETOROLAC TROMETHAMINE 30 MG: 30 INJECTION, SOLUTION INTRAMUSCULAR; INTRAVENOUS at 18:20

## 2019-12-05 RX ADMIN — SODIUM CHLORIDE, POTASSIUM CHLORIDE, SODIUM LACTATE AND CALCIUM CHLORIDE 1000 ML: 600; 310; 30; 20 INJECTION, SOLUTION INTRAVENOUS at 09:11

## 2019-12-05 RX ADMIN — KETOROLAC TROMETHAMINE 30 MG: 30 INJECTION, SOLUTION INTRAMUSCULAR at 12:23

## 2019-12-05 RX ADMIN — BUPIVACAINE HYDROCHLORIDE IN DEXTROSE 1.6 ML: 7.5 INJECTION, SOLUTION SUBARACHNOID at 11:17

## 2019-12-05 NOTE — PROVIDER NOTIFICATION
12/05/19 0652   Provider Notification   Provider Name/Title    Method of Notification Electronic Page   Request Evaluate - Remote   Notification Reason Decels   Had a 2.5 minute decel. Returned to baseline spontaneously.

## 2019-12-05 NOTE — PLAN OF CARE
VSS. Continues on blood sugar monitoring for diabetes type 2. Patient complained of cramping during the day but improved overnight. Patient denies leaking of fluid or vaginal bleeding. Patient able to sleep overnight. See flowsheets for EFM.

## 2019-12-05 NOTE — PLAN OF CARE
Patient arrived to [facility] unit via zoom cart at 1600,with belongings, accompanied by family, with infant in NICU. Received report from Kjersti N and checked bands. Unit and room orientation started. Call light within arms reach; no concerns present at this time. Continue with plan of care.

## 2019-12-05 NOTE — ANESTHESIA PROCEDURE NOTES
Spinal/LP Procedure Note    Spinal Block  Staff:     Anesthesiologist:  Belen Wiseman MD    Resident/CRNA:  Danielle Orlando MD    Spinal/LP performed by resident/CRNA in presence of a teaching physician.    Location: OB and OR  Procedure Start/Stop Times:     patient identified, IV checked, site marked, risks and benefits discussed, informed consent, monitors and equipment checked, pre-op evaluation, at physician/surgeon's request and post-op pain management      Correct Patient: Yes      Correct Position: Yes      Correct Site: Yes      Correct Procedure: Yes      Correct Laterality:  N/A    Site Marked:  N/A  Procedure:     Procedure:  Intrathecal    ASA:  3    Position:  Sitting    Sterile Prep: chloraprep, mask, sterile gloves and patient draped      Insertion site:  L4-5    Approach:  Midline    Needle Type:  Milady    Needle gauge (G):  25    Local Skin Infiltration:  1% lidocaine    amount (ml):  3    Introducer used: Yes      Introducer gauge:  20 G    Attempts:  1    Redirects:  0    CSF:  Clear    Paresthesias:  No  Assessment/Narrative:     Sensory Level:  T10

## 2019-12-05 NOTE — ANESTHESIA POSTPROCEDURE EVALUATION
Anesthesia POST Procedure Evaluation    Patient: Yanet Reyes Avila   MRN:     [id number] Gender:   female   Age:    39 year old :      1980        Preoperative Diagnosis: * No pre-op diagnosis entered *   Procedure(s):   SECTION   Postop Comments: No value filed.       Anesthesia Type:  Not documented  MAC, Spinal, Peripheral Nerve Block, For Post-op pain in coordination with surgeon    Reportable Event: NO     PAIN: Uncomplicated   Sign Out status: Comfortable, Well controlled pain     PONV: No PONV   Sign Out status:  No Nausea or Vomiting     Neuro/Psych: Uneventful perioperative course   Sign Out Status: Preoperative baseline; Age appropriate mentation     Airway/Resp.: Uneventful perioperative course   Sign Out Status: Non labored breathing, age appropriate RR; Resp. Status within EXPECTED Parameters     CV: Uneventful perioperative course   Sign Out status: Appropriate BP and perfusion indices; Appropriate HR/Rhythm     Disposition:   Sign Out in:  PACU  Disposition:  Floor  Recovery Course: Uneventful  Follow-Up: Not required           Last Anesthesia Record Vitals:  CRNA VITALS  2019 1250 - 2019 1350      2019             Pulse:  79    Ht Rate:  80    SpO2:  100 %          Last PACU Vitals:  Vitals Value Taken Time   /73 2019  2:15 PM   Temp     Pulse 91 2019  2:15 PM   Resp 18 2019  2:26 PM   SpO2 100 % 2019  2:26 PM   Temp src     NIBP     Pulse     SpO2     Resp     Temp     Ht Rate     Temp 2     Vitals shown include unvalidated device data.      Electronically Signed By: Belen Wiseman MD, 2019, 2:28 PM

## 2019-12-05 NOTE — PROGRESS NOTES
[facility]  MATERNAL CHILD HEALTH - SOCIAL WORK PROGRESS NOTE    SW met with pt multiple times throughout the day and professional  present throughout interactions.  SW spent time with pt prior to c/s for emotional support, and ensured understanding of the plan and baby's transfer to the NICU.  Multiple family members and pt's SO arrived prior to c/s, and were offering support and prayers to pt and her baby.  She is appropriately scared for her baby at this time, and utilizing her rosa and family to cope.  SW will continue to follow, offer psychosocial support and coordination of resources, and collaborate with the multidisciplinary team.    Anabel Marshall, [facility]  Clinical   Maternal Child Health  Phone: [phone]  Pager: [phone]

## 2019-12-05 NOTE — PLAN OF CARE
Data: Today is hospital day 3. Maternal status stable. Fetal assessment is EFM as charted.   Action: Continue with plan of care, which is fetal and blood sugar monitoring. Patient encouraged to move extremities while in bed.  Response: Patient coping well.

## 2019-12-05 NOTE — BRIEF OP NOTE
[facility]   Brief Operative Note     Surgery Date: 2019    Surgeon:  Vivienne Laura MD    Assistants:  MATA Dimas Fellow    Joseline Durbin, PGY-3    Pre-op Diagnosis:  1. Intrauterine pregnancy at 28w2d     2. Category II FHT     3. Persistent reverse end diastolic flow     4. Fetal growth restriction     5. Anyhydramnios     6. Type II Diabetes Mellitus     Post-op Diagnosis:  1. Same      2. Liveborn female infant     Procedure:  Primary classical  section with triple layer uterine closure via Pfannenstiel incision    Anesthesia: Spinal    EBL:  1500 mL    IVF:  1000 mL crystalloid    UOP:  200 mL clear urine at the end of the case    Drains: Rivas Catheter     Specimens:  Cord blood, Cord gases, placenta    Complications: None apparent    Findings:   1. No recto-fascial or intraabdominal adhesions  2. Clear amniotic fluid  3. Liveborn femlae infant in breech presentation. Apgars not yet recorded. Weight 500g.  4. Arterial cord pH 7.18, base deficit 7.9. Venous cord pH 7.3, base deficit 3.3.  5. Normal uterus, fallopian tubes, and ovaries.     Disposition:  Transferred in stable condition to MARJAN Alves MD  Maternal Fetal Medicine Fellow  2019 12:52 PM
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
## 2019-12-05 NOTE — OP NOTE
[facility]  Full Operative Progress Note     Surgery Date:  2019    Surgeon:  Vivienne Laura MD      Assistants:  Rebeca Alves MD MFM Fellow    Joseline Durbin MD, PGY-3    Jimytomást Mod, MS3    Pre-op Diagnosis:     1. Intrauterine pregnancy at 28w2d                                         2. Category II FHT                                         3. Persistent reverse end diastolic flow                                         4. Fetal growth restriction                                         5. Anyhydramnios                                         6. Type II Diabetes Mellitus         7. Breech presentation       Post-op Diagnosis:  1. Same      2. Liveborn female infant     Procedure:  Primary classical  section with triple layer uterine closure via Pfannenstiel incision    Anesthesia: Spinal    EBL:  1500 cc    IVF:  1100 mL crystalloid    UOP:  150 mL clear urine at the end of the case    Drains: Rivas Catheter     Specimens:  Cord blood, cord gases, placenta    Complications: None apparent    Indications:   Yanet Reyes Avila is a 39 year old  at 28w2d who was diagnosed with anhydramnios at 16 weeks and subsequent IUGR. Her most recent growth scan on  showed an EFW in the 3%ile. She has been followed closely in the outpatient setting with BPPs. She had previously been recommended betameathsone which she declined. On 12/, her BPP was 4/8, and she had a non reactive NST, and betamethasone with admission for fetal monitoring and control of hyperglycemia. On HD#4, her BPP was 4/8 with reversed end diastolic flow in the umbilical arteries, and she had persistent minimal variability and new decelerations. Delivery was recommended. Magnesium for neuroprotection was started.   The risks, benefits, and alternatives of classical  section were discussed with the patient, and she agreed to proceed.     Findings:   1. No recto-fascial or intraabdominal  adhesions  2. Clear amniotic fluid  3. Liveborn femlae infant in breech presentation. Apgars not yet recorded. Weight 500g.  4. Arterial cord pH 7.18, base deficit 7.9. Venous cord pH 7.3, base deficit 3.3.  5. Normal uterus, fallopian tubes, and ovaries.        Procedure Details:   The patient was brought to the OR, where adequate spinal anesthesia was administered.  She was placed in the dorsal supine position with a slight leftward tilt. She was prepped and draped in the usual sterile fashion. A surgical time out was performed. A pfannenstiel skin incision was made with the scalpel, and carried down to the underlying fascia with sharp and blunt dissection. The fascia was incised in the midline, and the incision was extended laterally with the Barrientos scissors. The superior aspect of the fascia was grasped with the Kocher clamps and dissected off of the underlying rectus muscles with blunt and sharp dissection. Attention was then turned to the inferior aspect of the fascia, which was similarly dissected off of the underlying rectus muscles. The rectus muscles were  in the midline, and the peritoneum was grasped with a Tami clamp and entered with Metzenbaum scissors, and the opening was extended with digital pressure. The bladder blade was placed. The lower uterine segment was not well developed, so the decision was made to proceed with a classical incision.  A classical hysterotomy was made with the scalpel, and the incision was extended superiorly and inferiorly with bandage scissors. The infant was noted to be in the breech position, and was delivered via standard breech maneuvers.  No nuchal cord was noted. The infant was placed in a plastic bag. The cord was doubly clamped and cut, and the infant was handed off to the awaiting NICU staff. A segment of cord was cut and . The placenta was delivered with gentle traction on the umbilical cord and uterine massage. The uterus was exteriorized and cleared of  all clots and debris. Uterine tone was noted to be firm of pitocin given through the running IV and uterine massage.  The first layer of the hysterotomy was closed with a running locked suture of 0 Vicryl.  The second layer of the hysterotomy was then closed with a running locked suture of 0 Monocryl suture. The serosa of the uterus was closed with a baseball stitch of 4-0 Monocryl. An additional figure-of-X suture of 0 Vicryl was placed at the inferior aspect of the hysterotomy. The hysterotomy was noted to be hemostatic. The posterior cul de sac was cleared of all clots and debris. The uterus was returned to the abdomen. The pericolic gutters were cleared of all clots and debris. The hysterotomy was reexamined and noted to be hemostatic. A half sheet of Seprafilm was placed over the hysterotomy. TThe fascia and rectus muscles were examined and areas of oozing were controlled with electrocautery. The fascia was closed with a running 0 Vicryl suture. The subcutaneous tissue was irrigated and areas of oozing were controlled with electrocautery. The subcutaneous tissue was greater than 2 cm in thickness, and was therefore closed with a running suture of 3-0 Vicryl. The skin was closed with 4-0 Monocryl and covered with a sterile dressing.    All sponge, needle, and instrument counts were correct. The patient tolerated the procedure well, and was transferred to recovery in stable condition. Dr. Laura was present and scrubbed for the entirety of the procedure.     Joseline Durbin MD  OBGYN PGY-3  2:02 PM 12/5/2019    I was present and scrubbed for entire procedure.   Vivienne Laura MD

## 2019-12-05 NOTE — PLAN OF CARE
Patient in NICU to visit baby. Report given to Jayde EDWARDS. Belongings placed in patients room. Provide comfort, support and monitoring during this transition time.

## 2019-12-05 NOTE — PLAN OF CARE
Patient prepared for surgery this  morning. Consent for surgery and TAPS block signed and witnessed with .  Pt NPO since 2400, Type and screen current. Baby will go to NICU due to early gestation.

## 2019-12-05 NOTE — PLAN OF CARE
Plan: c/s per Dr. Carreon.  Ipad  initially used to initiate surgery prep.   Pt agreeable to IV start and magnesium infusion.  Social work at  for emotion support until family arrived.  [place]  Jesus Manuel present for C/S discussion between pt, Dr. Carreon, and Dr. Alves.  Pt agrees with plan.  NICU updated by Judy angela RN. Anesthesia to see pt. FHR remains 150 occ variables, with minimal variablity and absent at times. Report to Patsy MONK RN.

## 2019-12-05 NOTE — PROGRESS NOTES
[facility] Diabetes Management  Daily Note           Assessment and Plan:   HPI: Marni is a 39 year old female currently at 28 weeks who was admitted 19 for betamethasone (BMZ). She has a history of well controlled GDM with use of insulin in pregnancy. Experienced steroid hyperglycemia.       Assessment:   1) Underlying TIIDM in high risk pregnancy, with anhydraminos, breech presentation  2) Steroid induced hyperglycemia-resolved       Steroid effect waning.     Plan:    -discontinued Metformin, NPH.   -PRN IV insulin (OB intrapartum) ordered if reaching threshold    -following delivery, will initiate moderate intensity sliding scale AC/HS.     Outpatient follow up: with [facility] (has appt scheduled with Mallika Borja PA-C 19)    Plan discussed with bedside RN.     Interval History and Assessment: interval glucose trend reviewed:   BG now trending to within goal as steroid effect wanes  Pre meal last evening 108  , .   Fasting glucose tightly controlled at 73    Discussed with RN; patient is going for  today due to decels on monitoring, ultrasound more concerning.     Current nutritional intake and type: Orders Placed This Encounter      NPO per Anesthesia Guidelines for Procedure/Surgery Except for: Meds    PTA Diabetes Regimen:   Pre-pregnancy:   Metformin 1000mg BID  During pregnancy:   Metformin 1000mg BID  NPH at HS, currently at 55 units    Discharge Planning: per primary team   We will continue to follow.            Diabetes History:   Type of Diabetes: Type 2 Diabetes Mellitus, Gestational Diabetes Mellitus  Lab Results   Component Value Date    A1C 8.2 2019              Review of Systems:   The Review of Systems is negative other than noted in the Interval History.           Medications:     Current Facility-Administered Medications   Medication     acetaminophen (TYLENOL) tablet 975 mg     aspirin (ASA) chewable tablet 162 mg     bisacodyl (DULCOLAX)  Suppository 10 mg     bupivacaine liposome (EXPAREL) 1.3 % LA inj susp 20 mL     calcium gluconate 10 % injection 1 g     ceFAZolin (ANCEF) 1 g vial to attach to  ml bag for ADULT or 50 ml bag for PEDS     ceFAZolin (ANCEF) 1 g vial to attach to  ml bag for ADULT or 50 ml bag for PEDS     ceFAZolin (ANCEF) intermittent infusion 2 g in 100 mL dextrose PRE-MIX     ceFAZolin (ANCEF) intermittent infusion 2 g in 100 mL dextrose PRE-MIX     glucose gel 15-30 g    Or     dextrose 50 % injection 25-50 mL    Or     glucagon injection 1 mg     docusate sodium (COLACE) capsule 100 mg     ePHEDrine injection 5 mg     insulin 1 units/1 mL saline (NovoLIN, HumuLIN Regular) infusion ADULT/PEDS     insulin aspart (NovoLOG) inj (RAPID ACTING)     insulin aspart (NovoLOG) inj (RAPID ACTING)     lactated ringers BOLUS 1,000 mL     lactated ringers BOLUS 1,000 mL    Or     lactated ringers BOLUS 500 mL     lactated ringers BOLUS 250 mL     lactated ringers infusion     lidocaine (LMX4) cream     lidocaine 1 % 0.1-1 mL     magnesium sulfate 40 MG/ML infusion     magnesium sulfate infusion     medication instruction     nalbuphine (NUBAIN) injection 2.5-5 mg     naloxone (NARCAN) injection 0.1-0.4 mg     ondansetron (ZOFRAN) injection 4 mg     Opioid plan postpartum - medication instruction     Patient NOT a candidate for azithromycin (ZITHROMAX) antibiotic.     prenatal multivitamin w/iron per tablet 1 tablet     sodium chloride (PF) 0.9% PF flush 3 mL     sodium chloride (PF) 0.9% PF flush 3 mL     sodium chloride 0.9% infusion     sodium citrate-citric acid (BICITRA) 500-334 MG/5ML solution     sodium citrate-citric acid (BICITRA) solution 30 mL     sodium phosphate (FLEET ENEMA) 1 enema            Physical Exam:    /77   Pulse 86   Temp 97.7  F (36.5  C) (Oral)   Resp 16   Wt 84.4 kg (186 lb)   LMP 05/11/2019   Breastfeeding No   BMI 31.93 kg/m    Pt not seen in person today, going for C/S.            Data:      Recent Labs   Lab 12/05/19  0829 12/05/19  0220 12/04/19  2215 12/04/19  2001 12/04/19  1700 12/04/19  1406  12/02/19  1940   GLC  --   --   --   --   --   --   --  88   BGM 73 88 106* 113* 101* 108*   < >  --     < > = values in this interval not displayed.     Lab Results   Component Value Date    WBC 10.2 12/02/2019    WBC 8.9 07/05/2019    HGB 11.8 12/02/2019    HGB 12.9 07/05/2019    HCT 34.9 (L) 12/02/2019    HCT 38.9 07/05/2019    MCV 91 12/02/2019    MCV 89 07/05/2019     12/02/2019     07/05/2019     Lab Results   Component Value Date     12/02/2019    POTASSIUM 3.5 12/02/2019    CHLORIDE 109 12/02/2019    CO2 21 12/02/2019    GLC 88 12/02/2019     Lab Results   Component Value Date    BUN 8 12/02/2019     Lab Results   Component Value Date    TSH 1.63 07/19/2019     Lab Results   Component Value Date    AST 14 12/02/2019    AST 30 07/19/2019    ALT 23 12/02/2019    ALT 65 (H) 07/19/2019    ALKPHOS 81 12/02/2019     Diabetes Management Team job code: [id number]  Traci Byrne PA-C  Inpatient Diabetes Management Service  Pager [phone]

## 2019-12-05 NOTE — PLAN OF CARE
Pt reported feeling dizzy. BG 55- received 8oz of apple juice. BG 15 minutes post juice 54. Plan is likely for apple juice/ginger ale mix. Reported to RN resuming care.

## 2019-12-05 NOTE — PLAN OF CARE
Data: Yanet Reyes Avila transferred to [facility] via cart at 1600. Baby in NICU. Pt and family stopped to visit baby before transfer. Anglican was performed by spiritual health. Pt is appropriate from an emotional standpoint.  Action: Receiving unit notified of transfer: Yes. Patient and family notified of room change. Report given to Jayde VERAS at 1630. Belongings sent to receiving unit. Accompanied by Registered Nurse. Oriented patient to surroundings. Call light within reach. ID bands double-checked with receiving RN.  Response: Patient tolerated transfer and is stable.

## 2019-12-05 NOTE — PROVIDER NOTIFICATION
12/04/19 2134   Provider Notification   Provider Name/Title Dr. Mcmillan   Method of Notification In Department   Request Evaluate - Remote   Notification Reason Other (Comment)   Strip reviewed with MD Minimal variability, little to no accels. Okay to remove monitors.   Patient states that has been feeling cramping throughout day, baby very active today. UA ordered.

## 2019-12-05 NOTE — ANESTHESIA CARE TRANSFER NOTE
Patient: Yanet Reyes Avila    Procedure(s):   SECTION    Preoperative Diagnosis:  at 28w2d; severe IUGR, anhidramnios     Diagnosis Additional Information: No value filed.    Anesthesia Type:   MAC, Spinal, Peripheral Nerve Block, For Post-op pain in coordination with surgeon     Note:  Airway :Room Air  Patient transferred to:PACU  Comments: VSS. Breathing spontaneously at a regular rate with adequate tidal volumes and maintaining O2 sats on room air. Denies nausea or pain. No apparent complications from anesthesia. Bilateral TAP block well tolerated.     Danielle Orlando MD  Anesthesiology Resident, CA-1    Handoff Report: Identifed the Patient, Identified the Reponsible Provider, Reviewed the pertinent medical history, Discussed the surgical course, Reviewed Intra-OP anesthesia mangement and issues during anesthesia, Set expectations for post-procedure period and Allowed opportunity for questions and acknowledgement of understanding      Vitals: (Last set prior to Anesthesia Care Transfer)    CRNA VITALS  2019 1139 - 2019 1209      2019             Pulse:  65    Ht Rate:  63    SpO2:  100 %                Electronically Signed By: Danielle Orlando MD  2019  12:09 PM

## 2019-12-05 NOTE — ANESTHESIA PROCEDURE NOTES
Peripheral Nerve Block Procedure Note    Staff:     Anesthesiologist:  Belen Wiseman MD    Resident/CRNA:  Danielle Orlando MD    Block performed by resident/CRNA in the presence of a teaching physician    Location: OB and OR AFTER induction  Procedure Start/Stop TImes:      12/5/2019 1:21 PM    patient identified, IV checked, site marked, risks and benefits discussed, informed consent, monitors and equipment checked, pre-op evaluation, at physician/surgeon's request and post-op pain management      Correct Patient: Yes      Correct Position: Yes      Correct Site: Yes      Correct Procedure: Yes      Correct Laterality:  N/A    Site Marked:  N/A  Procedure details:     Procedure:  TAP    ASA:  3    Laterality:  Bilateral    Position:  Supine    Sterile Prep: chloraprep, mask and sterile gloves      Local skin infiltration:  None    Needle:  Insulated    Needle gauge:  21    Needle length (inches):  3.13    Ultrasound: Yes      Ultrasound used to identify targeted nerve, plexus, or vascular structure and placed a needle adjacent to it      Permanent Image entered into patiient's record      Abnormal pain on injection: No      Blood Aspirated: No      Paresthesias:  No    Bleeding at site: No      Bolus via:  Needle    Infusion Method:  Single Shot    Complications:  None

## 2019-12-05 NOTE — PROVIDER NOTIFICATION
12/05/19 0750   Provider Notification   Provider Name/Title Dr. Carreon   Method of Notification In Department   Notification Reason Status Update   Dr. Carreon notified of US results by Annabelle sonographrosemarie. Pt placed back on fetal monitor after pt returned to her room.
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
## 2019-12-06 LAB
GLUCOSE BLDC GLUCOMTR-MCNC: 104 MG/DL (ref 70–99)
GLUCOSE BLDC GLUCOMTR-MCNC: 114 MG/DL (ref 70–99)
GLUCOSE BLDC GLUCOMTR-MCNC: 134 MG/DL (ref 70–99)
GLUCOSE BLDC GLUCOMTR-MCNC: 147 MG/DL (ref 70–99)
GLUCOSE BLDC GLUCOMTR-MCNC: 95 MG/DL (ref 70–99)
HGB BLD-MCNC: 7.6 G/DL (ref 11.7–15.7)
HGB BLD-MCNC: 7.6 G/DL (ref 11.7–15.7)
HGB BLD-MCNC: 7.9 G/DL (ref 11.7–15.7)

## 2019-12-06 PROCEDURE — 25000132 ZZH RX MED GY IP 250 OP 250 PS 637: Performed by: STUDENT IN AN ORGANIZED HEALTH CARE EDUCATION/TRAINING PROGRAM

## 2019-12-06 PROCEDURE — 36415 COLL VENOUS BLD VENIPUNCTURE: CPT | Performed by: STUDENT IN AN ORGANIZED HEALTH CARE EDUCATION/TRAINING PROGRAM

## 2019-12-06 PROCEDURE — 85018 HEMOGLOBIN: CPT | Performed by: STUDENT IN AN ORGANIZED HEALTH CARE EDUCATION/TRAINING PROGRAM

## 2019-12-06 PROCEDURE — 12000001 ZZH R&B MED SURG/OB UMMC

## 2019-12-06 PROCEDURE — 25000128 H RX IP 250 OP 636: Performed by: STUDENT IN AN ORGANIZED HEALTH CARE EDUCATION/TRAINING PROGRAM

## 2019-12-06 PROCEDURE — 00000146 ZZHCL STATISTIC GLUCOSE BY METER IP

## 2019-12-06 RX ORDER — POLYETHYLENE GLYCOL 3350 17 G/17G
17 POWDER, FOR SOLUTION ORAL DAILY
Status: DISCONTINUED | OUTPATIENT
Start: 2019-12-06 | End: 2019-12-08 | Stop reason: HOSPADM

## 2019-12-06 RX ADMIN — ACETAMINOPHEN 650 MG: 325 TABLET, FILM COATED ORAL at 00:24

## 2019-12-06 RX ADMIN — IBUPROFEN 600 MG: 600 TABLET ORAL at 19:30

## 2019-12-06 RX ADMIN — ACETAMINOPHEN 650 MG: 325 TABLET, FILM COATED ORAL at 12:23

## 2019-12-06 RX ADMIN — POLYETHYLENE GLYCOL 3350 17 G: 17 POWDER, FOR SOLUTION ORAL at 12:30

## 2019-12-06 RX ADMIN — SENNOSIDES AND DOCUSATE SODIUM 2 TABLET: 8.6; 5 TABLET ORAL at 16:59

## 2019-12-06 RX ADMIN — SENNOSIDES AND DOCUSATE SODIUM 2 TABLET: 8.6; 5 TABLET ORAL at 09:59

## 2019-12-06 RX ADMIN — ACETAMINOPHEN 650 MG: 325 TABLET, FILM COATED ORAL at 19:30

## 2019-12-06 RX ADMIN — ACETAMINOPHEN 650 MG: 325 TABLET, FILM COATED ORAL at 06:38

## 2019-12-06 RX ADMIN — OXYCODONE HYDROCHLORIDE 5 MG: 5 TABLET ORAL at 22:24

## 2019-12-06 RX ADMIN — KETOROLAC TROMETHAMINE 30 MG: 30 INJECTION, SOLUTION INTRAMUSCULAR; INTRAVENOUS at 06:38

## 2019-12-06 RX ADMIN — KETOROLAC TROMETHAMINE 30 MG: 30 INJECTION, SOLUTION INTRAMUSCULAR; INTRAVENOUS at 00:24

## 2019-12-06 RX ADMIN — IBUPROFEN 600 MG: 600 TABLET ORAL at 12:23

## 2019-12-06 RX ADMIN — SIMETHICONE CHEW TAB 80 MG 80 MG: 80 TABLET ORAL at 17:00

## 2019-12-06 NOTE — PROGRESS NOTES
"IP Diabetes Management  Daily Note           Assessment and Plan:   HPI: Marni is a 39 year old female currently at 28 weeks who was admitted 19 for betamethasone (BMZ). She has a history of well controlled GDM with use of insulin in pregnancy. Experienced steroid hyperglycemia. Delivered via  due to non-reassuring fetal status . Baby in NICU.     Assessment:   1) Underlying TIIDM in high risk pregnancy, with anhydraminos, breech presentation  2) Steroid induced hyperglycemia-resolved       Plan while Hospitalized:    -Moderate intensity sliding scale AC, HS    Recommendation for Discharge: instructions posted in discharge AVS for patient    -continue to hold PTA Metformin, to be resumed in the outpatient setting (following repeat counseling regarding unknown long term effect of use while breastfeeding) pending glycemic control   -recommend continuing twice daily BG monitoring upon discharge, alternating between fasting, pre-meal, and bedtime checks to gain data throughout the day.     Outpatient follow up: with [facility] endocrinology service (has appt scheduled with Mallika Borja PA-C 19)     Interval History and Assessment: interval glucose trend reviewed:   Delivered yesterday afternoon. She is doing well  Glycemic control has been excellent thus far post delivery.   Discussed that she currently is not requiring the use of Metformin.   Discussed that, if indicated, metformin or insulin could be resumed for glycemic control. Discussed Metformin not well studied long term in use while breastfeeding, so risk to baby is unknown.   Marni reports that she is going to watch her diet, and be more active, \"to stay heathy for my baby.\"    Current nutritional intake and type: Orders Placed This Encounter      Moderate Consistent CHO Diet    PTA Diabetes Regimen:   Pre-pregnancy:   Metformin 1000mg BID  During pregnancy:   Metformin 1000mg BID  NPH 55 units qHS    Discharge Planning: per primary team " "  We will sign off today. Call back with questions prior to discharge.            Diabetes History:   Type of Diabetes: Type 2 Diabetes Mellitus, Gestational Diabetes Mellitus  Lab Results   Component Value Date    A1C 8.2 07/05/2019              Review of Systems:   The Review of Systems is negative other than noted in the Interval History.           Medications:     Current Facility-Administered Medications   Medication     acetaminophen (TYLENOL) tablet 650 mg     [START ON 12/7/2019] bisacodyl (DULCOLAX) Suppository 10 mg     bupivacaine liposome (EXPAREL) LONG ACTING injection was administered into the infiltration site to produce postsurgical analgesia. Duration of action is up to 72 hours, and other \"sathya\" medications should not be given for 96 hours with the exception of the lidocaine 5% patch (LIDODERM) and the lidocaine 10mg in potassium infusions. This entry is for INFORMATION ONLY.     carboprost (HEMABATE) injection 250 mcg     dextrose 5% in lactated ringers infusion     glucose gel 15-30 g    Or     dextrose 50 % injection 25-50 mL    Or     glucagon injection 1 mg     diphenhydrAMINE (BENADRYL) capsule 25 mg    Or     diphenhydrAMINE (BENADRYL) injection 25 mg     hydrocortisone 2.5 % cream     ibuprofen (ADVIL/MOTRIN) tablet 600 mg     insulin aspart (NovoLOG) inj (RAPID ACTING)     insulin aspart (NovoLOG) inj (RAPID ACTING)     ketorolac (TORADOL) injection 30 mg     lactated ringers BOLUS 1,000 mL     lanolin ointment     lidocaine (LMX4) cream     lidocaine 1 % 0.1-1 mL     Measles, Mumps & Rubella Vac (MMR) injection 0.5 mL     methylergonovine (METHERGINE) injection 200 mcg     metoclopramide (REGLAN) injection 10 mg     misoprostol (CYTOTEC) tablet 800 mcg     naloxone (NARCAN) injection 0.1-0.4 mg     No MMR Needed -  Assessment: Patient does not need MMR vaccine     NO Rho (D) immune globulin (RhoGam) needed - mother Rh POSITIVE     No Tdap Needed - Assessment: Patient does not need Tdap " vaccine     ondansetron (ZOFRAN) injection 4 mg     oxyCODONE (ROXICODONE) tablet 5-10 mg     oxytocin (PITOCIN) 30 units in 500 mL 0.9% NaCl infusion     oxytocin (PITOCIN) 30 units in 500 mL 0.9% NaCl infusion     oxytocin (PITOCIN) injection 10 Units     prochlorperazine (COMPAZINE) injection 10 mg    Or     prochlorperazine (COMPAZINE) Suppository 25 mg     senna-docusate (SENOKOT-S/PERICOLACE) 8.6-50 MG per tablet 1 tablet    Or     senna-docusate (SENOKOT-S/PERICOLACE) 8.6-50 MG per tablet 2 tablet     simethicone (MYLICON) chewable tablet 80 mg     sodium chloride (PF) 0.9% PF flush 3 mL     sodium chloride (PF) 0.9% PF flush 3 mL     [START ON 12/7/2019] sodium phosphate (FLEET ENEMA) 1 enema     tranexamic acid (CYKLOKAPRON) Bolus 1g vial attach to NaCl 50 or 100 mL bag ADULT            Physical Exam:    /69   Pulse 100   Temp 98.2  F (36.8  C) (Oral)   Resp 18   Wt 84.4 kg (186 lb)   LMP 05/11/2019   SpO2 100%   Breastfeeding Unknown   BMI 31.93 kg/m    Pt not seen in person today, going for C/S.            Data:     Recent Labs   Lab 12/06/19  0634 12/06/19  0034 12/05/19  2124 12/05/19  1437 12/05/19  1402 12/05/19  1344  12/02/19  1940   GLC  --   --   --   --   --   --   --  88   * 114* 86 101* 54* 55*   < >  --     < > = values in this interval not displayed.     Lab Results   Component Value Date    WBC 14.0 (H) 12/05/2019    WBC 10.2 12/02/2019    WBC 8.9 07/05/2019    HGB 7.6 (L) 12/06/2019    HGB 11.9 12/05/2019    HGB 11.8 12/02/2019    HCT 35.6 12/05/2019    HCT 34.9 (L) 12/02/2019    HCT 38.9 07/05/2019    MCV 92 12/05/2019    MCV 91 12/02/2019    MCV 89 07/05/2019     12/05/2019     12/02/2019     07/05/2019     Lab Results   Component Value Date     12/02/2019    POTASSIUM 3.5 12/02/2019    CHLORIDE 109 12/02/2019    CO2 21 12/02/2019    GLC 88 12/02/2019     Lab Results   Component Value Date    BUN 8 12/02/2019     Lab Results   Component  Value Date    TSH 1.63 07/19/2019     Lab Results   Component Value Date    AST 14 12/02/2019    AST 30 07/19/2019    ALT 23 12/02/2019    ALT 65 (H) 07/19/2019    ALKPHOS 81 12/02/2019     Diabetes Management Team job code: [id number]  Traci Byrne PA-C  Inpatient Diabetes Management Service  Pager [phone]

## 2019-12-06 NOTE — PLAN OF CARE
"Patient is French speaking, but speaks enough English for basic cares. Denies needing an  for basic cares.    VSS and postpartum assessments WDL. Patient has GDM and an insulin pen with sliding scale. She has not needed insulin since delivery. See results review for BG check results. Blood glucose to be checked at 0200 without correction.    Baby girl, Belen in NICU. Belen's condition and prognosis is uncertain, therefore the patient has requested to visit her as she is able. Pt got up and into wheelchair for NICU visit; denied dizziness, reported feeling steady on feet.  She has not been out of bed since.      Patient has requested a pump so that she can provide breastmilk for Belen as she is able. I brought a pump in her room, and she requested to start using it \"later.\"     Patient videos from [employer] modified due to Belen's uncertain condition.    Pain managed with toradol & tylenol.  PIV in left hand.  Family, friends and  present and supportive.  Will continue with postpartum cares and education per plan of care.   "

## 2019-12-06 NOTE — PROGRESS NOTES
"SPIRITUAL HEALTH SERVICES  SPIRITUAL ASSESSMENT Progress Note  [facility] ([facility]) NICU    ILLNESS CIRCUMSTANCES:   Reviewed documentation. Reflective conversation shared with pt Marni, which integrated elements of NICU experience with her new baby belen and spiritual narratives.     Context of Serious Illness/Symptom(s) - Marni's baby girl \"Elena Valentina Gomez Reyes\" was born yesterday at 28w2d, with severe fetal growth restriction and birth weight of 500g. She is receiving ongoing care on the NICU.     Resources for Support - Mom identifies strong support from her extended family who have been with her since baby's delivery yesterday.     DISTRESS:     Emotional/Spiritual/Existential Distress - Marni reflected on her worries from yesterday, however she was much relieved that Belen is \"doing a little better\" today. Yesterday, she was tearful in the midst of her fears and worries for Belen, saying \"I want her to be ok.\"     SPIRITUAL/Anabaptism COPING:     Jewish/Mitali - Anabaptism. Family is connected to [employer] [employer] in [facility].     Spiritual Practice(s) - Prayer, Quaker connection, devotion to [facility]. Pentecostal, which I provided for baby Belen yesterday.     Emotional/Relational/Existential Connections - Marni reflected on her hopes for Belen, including her eventual discharge home to be \"presented\" at her [employer]. Marni said that she \"made a promise to the [facility] [employer] [facility] to present Belen at the Qu[employer].\"     Meaning/Sense-Making - Marni is hoping for the best for her baby Belen and today, she is focusing on the positive.     PLAN: Will provide baptismal certificate for family on Monday and will continue to follow during NICU admission. [facility] remains available for any further needs or requests.     DANIEL LebronDiv  Associate   Pager [phone]    * [facility] remains available 24/7 for emergent requests/referrals, either by having the switchboard page the " on-call  or by entering an ASAP/STAT consult in Epic (this will also page the on-call ).*

## 2019-12-06 NOTE — DISCHARGE INSTRUCTIONS
Please continue to check your pre-meal and bedtime blood sugars twice daily until your next follow up appointment. Alternate the time you check each day, to get data throughout the day. Write the numbers down and bring to your follow up appointment.    You do not need any medications for blood sugar control on discharge. Continue to monitor your dietary intake and increase activity as tolerated. We will discuss resumption of metformin or insulin if needed for blood sugar control in the clinic.     You have an appointment scheduled with Mallika Borja PA-C 19 in the Endocrine clinic. Please call the clinic at [phone] if you have non-urgent questions regarding your blood sugars or insulin.     If you have urgent questions or concerns regarding your blood sugars or insulin, you may contact [phone] (the main hospital ). Ask to speak with the endocrinologist on call.    Thank you for letting the Diabetes Management Team be involved in your care!     Birth Discharge Instructions: [place]  Actividad    No levante más de 10 libras tino las 6 semanas posteriores a matias cirugía. Pida a los miembros de matias jaswinder y amigos que la ayuden cuando lo necesite.    No conduzca si está tomando píldoras para el dolor recetadas por matias médico. Puede conducir si está tomando píldoras de venta vee para el dolor.    No kaylie ejercicio ni actividades intensas por 6 semanas. No kaylie nada que requiera un esfuerzo en el sitio de matias cirugía.    No kaylie fuerza al usar el baño. Matias equipo de atención podría recetarle un laxante si tiene problemas para  el intestino (estreñimiento).    Para cuidar de matias incisión:    Mantenga la incisión limpia y seca    No empape matias incisión en agua. No nade ni use la rodrick ni jacuzzis hasta que matias incisión haya cicatrizado por completo. Puede sentarse en la rodrick si el nivel del agua está por debajo de matias incisión.    Después de lavarse, seque garth matias incisión. Incluya la piel  que podría entrar en contacto con matias incisión.    No use agua oxigenada, gel, cremas, lociones ni ungüentos sobre matias incisión.    Ajuste matias ropa para evitar la presión en el sitio de matias cirugía (compruebe el elástico en matias ropa interior, por ejemplo)    Si tiene Steri-Strips, deje las pequeñas tiras de cinta en matias sitio. Se caerán solas o puede quitárselas después de rios semana.    Podría alaina rios pequeña cantidad de secreción transparente o rosada y esto es normal. Consulte con matias proveedor de atención médica:    Si el drenaje aumenta o tiene olor.    Si tiene hinchazón, enrojecimiento o rios erupción alrededor de la incisión.    Si tiene más dolor y matias medicamento para el dolor no ayuda    Si tiene fiebre de 100.4  F (38  C) o más (temperatura tomada bajo matias lengua) con o sin escalofríos     El área alrededor de matias incisión (herida de la cirugía) se sentirá adormecida. [place] es normal. El adormecimiento debería desaparecer en menos de un año.       Mantenga maxx terence limpias:  Siempre lávese las terence antes de tocar matias incisión. [place] ayuda a reducir matias riesgo de infección. Si maxx terence no están sucias, puede usar un gel de alcohol para limpiarse las terence. Mantenga maxx uñas cortas y limpias.   Llame a matias proveedor de atención médica si tiene alguno de estos síntomas:    Empapa rios toalla femenina con ivett en el correr de 1 hora o ve coágulos más grandes que rios pelota de golf.    Sangrado que dura más de 6 semanas.    Tiene rios secreción vaginal que huele mal.    Dolor, calambres o sensibilidad graves en la región inferior de matias vientre.    Necesidad más frecuente o urgente de orinar (hacer pis), o ardor al hacerlo.    Náuseas y vómitos    Enrojecimiento, hinchazón o dolor alrededor de rios vena en matias pierna.    Problemas para amamantar o un área enrojecida o dolorosa en matias pecho.    Si tiene dolor en el pecho y tos o dificultad para respirar.    Problemas para manejar la tristeza, ansiedad o depresión.     Si le  preocupa hacerse daño o hacerle daño al bebé, llame al médico de inmediato.    Tiene preguntas o inquietudes después de regresar a casa.       Birth Discharge Instructions  Activity    Do not lift more than 10 pounds for 6 weeks after surgery. Ask family and friends for help when you need it.    Do not drive while taking pain pills prescribed by your doctor. You may drive if taking over-the-counter pain pills.    No heavy exercise or activity for 6 weeks. Don t do anything that will put a strain on your surgery site.    Don t strain when using the toilet. Your care team may prescribe a stool softener if you have problems with your bowel movements (constipation).    To care for your incision:    Keep the incision clean and dry    Do not soak your incision in water. No swimming or hot tubs until your incision has fully healed. You may soak in the bathtub if the water level is below your incision.    After washing, dry your incision well. Include the skin that may come in contact with your incision.    Do not use any peroxide, gel, cream, lotion, or ointment on your incision.     Adjust your clothes to avoid pressure on your surgery site (check the elastic in your underwear for example)    If you have Steri-Strips, leave the small strips of tape in place. They will fall off on their own, or you can remove them after one week.    You may see a small amount of clear or pink drainage and this is normal. Check with your health care provider:    If the drainage increases or has an odor.    If you have swelling, redness, or a rash around the incision.    If you have increased pain and your pain medicine doesn t help    If you have a fever of 100.4  F (38  C) or higher (temperature taken under your tongue), with or without chills   The area around your incision (surgery wound) will feel numb. This is normal. The numbness should go away in less than a year.   Keep your hands clean:   Always wash your hands before  touching your incision. This helps reduce your risk of infection. If your hands aren t dirty, you may use an alcohol hand-rub to clean your hands. Keep your nails clean and short.     Call your health care provider if you have any of these symptoms:    You soak a sanitary pad with blood within 1 hour, or you see blood clots larger than a golf ball.    Bleeding that lasts more than 6 weeks.    You have vaginal discharge that smells bad.    Severe pain, cramping or tenderness in your lower belly area.    A more frequent or urgent need to urinate (pee), or it burns when you pee.    Nausea and vomiting.    Redness, swelling or pain around a vein in your leg.    Problems breastfeeding, or a red or painful area on your breast.    If you have chest pain and cough or are gasping for air.    Problems coping with sadness, anxiety, or depression.     If you have any concerns about hurting yourself of the baby, call your doctor right away.      You have questions or concerns after you return home.

## 2019-12-06 NOTE — PROGRESS NOTES
Post  Anesthesia Follow Up Note    Patient: Yanet Reyes Avila    Patient location: Postpartum floor.    Chief complaint: Acute postoperative pain managment    Procedure(s) Performed:  No admission procedures for hospital encounter.    Anesthesia type: Spinal Block and transversus abdominus plane (TAP) nerve block        Subjective:   The patient reports good pain control.  Pain Intensity: 3/10.  Patient reports mild pruritus, denies weakness, paresthesia.  Denies numbness, tingling lips, tinnitus, metallic taste, difficulties breathing nausea, or vomiting. Rivas still in place.She is able to ambulate and tolerates regular diet.        Objective:  Respiratory Function (RR / SpO2 / Airway Patency): Satisfactory    Cardiac Function (HR / Rhythm / BP): Satisfactory    Strength and sensation lower extremities: Strength 5/5 and grossly symmetric bilateral LE    Site of spinal/epidural insertion: clean and intact, no tenderness, swelling or erythema    Last Vitals: /69   Pulse 100   Temp 36.8  C (98.2  F) (Oral)   Resp 18   Wt 84.4 kg (186 lb)   LMP 2019   SpO2 100%   Breastfeeding Unknown   BMI 31.93 kg/m        Assessment and Plan:   Yanet Reyes Avila is a 39 year old female POD #1 s/p low-transverse caesarian section with spinal morphine, 0.15 mg IT and single shot TAP nerve block injections bupivacaine 0.25% with epinephrine 1:200,000 20 mL, then liposome bupivacaine (Exparel) long-acting 1.3% 20 mL given on 2019 for postoperative analgesia.  Pt is ambulating without difficulty, no weakness or paresthesias.  No evidence of adverse side effects associated with spinal and nerve block injections. Pt is receiving adequate incisional pain control.  Anticipate up to 72 hours of incisional pain control.  Anticipate patient will require opioid/nonopioid analgesics for visceral and muscle pain not controlled with local anesthetic.      - NO other local anesthetic use within 96 hours of liposome  bupivacaine (Exparel) long acting  - patient received verbal and written instructions about liposome bupivacaine   - please call if questions or concerns  - discussed plan with attending anesthesiologist    Danielle Orlando MD  Anesthesiology Resident, CA-1  12/6/2019 9:05 AM    If questions or concerns, please contact OB Anesthesiologist  Phone [phone]

## 2019-12-06 NOTE — PROVIDER NOTIFICATION
12/06/19 0726   Provider Notification   Provider Name/Title G3 Myrhe   Method of Notification Electronic Page   Request Evaluate-Remote   Notification Reason Lab Results     FYI hgb is 7.6 this morning. Pt is asymptomatic but slightly tachy .

## 2019-12-06 NOTE — PLAN OF CARE
Data: Vital signs within normal limits. Postpartum checks within normal limits - fundus firm at U/2. Patient eating and drinking normally. Sotelo removed at 0700; output was great prior to sotelo removal. Has yet to void. No apparent signs of infection. UTV incision. Fasting BG done this AM was 104. Patient went down to visit infant in NICU at beginning of shift. Patient wishes to start pumping for infant down in NICU.   Action: Patient medicated during the shift for pain. See MAR. Patient stated she was comfortable.   Response: Support person, significant other, present.   Plan: Continue with plan of care.

## 2019-12-06 NOTE — PLAN OF CARE
Data: VSS, postpartum assessments WNL. She is voiding without difficulty, up ad kwasi, passing gas but no BM yet (requesting Miralax) eating and drinking normally. Ate and did not inform RN therefore blood glucose was not checked this morning with late breakfast. Incision appears to be healing well, lochia WNL, no s/s infection. She is independent with self care and plans to shower later today. Pumping for baby in the NICU, lactation saw Pt today. Taking tylenol and ibu for pain. Reports good pain management. Hgb of 7.6 this morning, provider is aware, Pt is asymptomatic.   Action: Education provided on plan of care. Miralax ordered.   Response: Pt is agreeable with her plan of care. Support person present.

## 2019-12-06 NOTE — PROVIDER NOTIFICATION
12/06/19 1131   Provider Notification   Provider Name/Title G2 Fronek   Method of Notification Electronic Page   Request Evaluate-Remote   Notification Reason Medication Request  (Miralax per Pt request)
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
## 2019-12-06 NOTE — PROVIDER NOTIFICATION
12/06/19 0215   Provider Notification   Provider Name/Title G2 (Dr. Fraga)   Method of Notification Electronic Page   Request Evaluate-Remote   Notification Reason Other     Hey just wanted to clarify GDM orders for pt. She has 0200 BG that says to be done if pt on insulin, which she has not needed. Do you want this done? Also did not see a fasting BG for the AM ordered - did you want one? Thanks!

## 2019-12-06 NOTE — LACTATION NOTE
D:  I met with Marni and  in her postpartum room today.  Belen is her first baby.  Marni has DM II which she usually manages with diet; she has used metformin and insulin during pregnancy.  She has no history of breast/chest surgery or trauma, had not yet been helped to start pumping.  I:  I gave her a folder of introductory materials and went over pumping guidelines.    We talked about hands on pumping techniques, hand expression and how to access the [employer] websites. I assisted her in starting to pump, helped her work on hand expression.  I told her we could help her obtain a pump to use at discharge.  A:  Mom able to start pumping, colostrum sent to her baby.  P:  Will continue to provide lactation support.       Yoko Wild, RNC, IBCLC

## 2019-12-07 LAB
ALBUMIN UR-MCNC: NEGATIVE MG/DL
APPEARANCE UR: CLEAR
BACTERIA #/AREA URNS HPF: ABNORMAL /HPF
BILIRUB UR QL STRIP: NEGATIVE
COLOR UR AUTO: ABNORMAL
GLUCOSE BLDC GLUCOMTR-MCNC: 105 MG/DL (ref 70–99)
GLUCOSE BLDC GLUCOMTR-MCNC: 110 MG/DL (ref 70–99)
GLUCOSE BLDC GLUCOMTR-MCNC: 158 MG/DL (ref 70–99)
GLUCOSE UR STRIP-MCNC: 70 MG/DL
HGB UR QL STRIP: ABNORMAL
KETONES UR STRIP-MCNC: NEGATIVE MG/DL
LEUKOCYTE ESTERASE UR QL STRIP: NEGATIVE
NITRATE UR QL: NEGATIVE
PH UR STRIP: 6 PH (ref 5–7)
RBC #/AREA URNS AUTO: 0 /HPF (ref 0–2)
SOURCE: ABNORMAL
SP GR UR STRIP: 1 (ref 1–1.03)
SQUAMOUS #/AREA URNS AUTO: <1 /HPF (ref 0–1)
UROBILINOGEN UR STRIP-MCNC: NORMAL MG/DL (ref 0–2)
WBC #/AREA URNS AUTO: 0 /HPF (ref 0–5)

## 2019-12-07 PROCEDURE — 25000132 ZZH RX MED GY IP 250 OP 250 PS 637: Performed by: STUDENT IN AN ORGANIZED HEALTH CARE EDUCATION/TRAINING PROGRAM

## 2019-12-07 PROCEDURE — 87086 URINE CULTURE/COLONY COUNT: CPT | Performed by: STUDENT IN AN ORGANIZED HEALTH CARE EDUCATION/TRAINING PROGRAM

## 2019-12-07 PROCEDURE — 81001 URINALYSIS AUTO W/SCOPE: CPT | Performed by: STUDENT IN AN ORGANIZED HEALTH CARE EDUCATION/TRAINING PROGRAM

## 2019-12-07 PROCEDURE — 12000001 ZZH R&B MED SURG/OB UMMC

## 2019-12-07 PROCEDURE — 00000146 ZZHCL STATISTIC GLUCOSE BY METER IP

## 2019-12-07 RX ADMIN — ACETAMINOPHEN 650 MG: 325 TABLET, FILM COATED ORAL at 20:03

## 2019-12-07 RX ADMIN — ACETAMINOPHEN 650 MG: 325 TABLET, FILM COATED ORAL at 07:44

## 2019-12-07 RX ADMIN — SENNOSIDES AND DOCUSATE SODIUM 2 TABLET: 8.6; 5 TABLET ORAL at 20:03

## 2019-12-07 RX ADMIN — IBUPROFEN 600 MG: 600 TABLET ORAL at 20:03

## 2019-12-07 RX ADMIN — POLYETHYLENE GLYCOL 3350 17 G: 17 POWDER, FOR SOLUTION ORAL at 07:44

## 2019-12-07 RX ADMIN — IBUPROFEN 600 MG: 600 TABLET ORAL at 07:44

## 2019-12-07 RX ADMIN — SENNOSIDES AND DOCUSATE SODIUM 2 TABLET: 8.6; 5 TABLET ORAL at 07:43

## 2019-12-07 RX ADMIN — IBUPROFEN 600 MG: 600 TABLET ORAL at 14:10

## 2019-12-07 RX ADMIN — IBUPROFEN 600 MG: 600 TABLET ORAL at 01:27

## 2019-12-07 RX ADMIN — ACETAMINOPHEN 650 MG: 325 TABLET, FILM COATED ORAL at 14:10

## 2019-12-07 RX ADMIN — ACETAMINOPHEN 650 MG: 325 TABLET, FILM COATED ORAL at 01:27

## 2019-12-07 NOTE — PLAN OF CARE
VSS and postpartum assessments WDL.  Up ad kwasi with steady gait.  Independent with cares.  Infant, Belen, in NICU; Pumping independently and bringing milk down to NICU for baby.  Pain managed with tylenol and ibuprofen. Mom suffering from mild constipation; given senna x2 to help. Miralax given earlier and resulted in very small, hard BM. Simethicone given to relieve gas pain, as well.    Near the end of the shift, patient reported feeling burning with urination that had gotten steadily worse during the evening. MD was notified and orders for a UA were placed.     Boyfriend Khanh present and supportive.  Will continue with postpartum cares and education per plan of care.
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
## 2019-12-07 NOTE — PLAN OF CARE
Data: VSS, postpartum assessments WNL. She is voiding without difficulty, up ad kwasi, passing gas, had small BM yesterday,eating and drinking normally. Has not needed insulin today before meals. Incision appears to be healing well, lochia WNL, no s/s infection. She is independent with self cares and is pumping for baby in NICU. Taking tylenol, ibuprofen, and prn oxycodone for incisional pain. Reports good pain management.   Action: Education provided on discharge goals.  Response: Pt is agreeable with her plan of care. Goes to NICU often to be with infant. Support person present. Anticipate discharge 12/8.

## 2019-12-07 NOTE — PROVIDER NOTIFICATION
12/06/19 2224   Provider Notification   Provider Name/Title Myhre   Method of Notification Electronic Page   Request Evaluate-Remote   Notification Reason Vital Signs Change     Pt is complaining of pain during urination that has gotten a lot worse over the past few times she has urinated. She states it is a 9 on a scale of 1-10. Would you like to order a urine sample collection?

## 2019-12-07 NOTE — PLAN OF CARE
Vitals & PP assessments are stable. Lungs clear. Voiding without difficulties. UA sent to lab with result still pending. Had BM & passing flatus. Pumping & taking milk to NICU. Walking in hallways & visiting infant. Pt pain well controled with her PO pain meds.     Will continue on supportive cares.    .

## 2019-12-08 VITALS
BODY MASS INDEX: 31.93 KG/M2 | RESPIRATION RATE: 18 BRPM | DIASTOLIC BLOOD PRESSURE: 70 MMHG | OXYGEN SATURATION: 100 % | SYSTOLIC BLOOD PRESSURE: 116 MMHG | TEMPERATURE: 98.1 F | HEART RATE: 98 BPM | WEIGHT: 186 LBS

## 2019-12-08 LAB
BACTERIA SPEC CULT: NO GROWTH
GLUCOSE BLDC GLUCOMTR-MCNC: 112 MG/DL (ref 70–99)
GLUCOSE BLDC GLUCOMTR-MCNC: 143 MG/DL (ref 70–99)
Lab: NORMAL
SPECIMEN SOURCE: NORMAL

## 2019-12-08 PROCEDURE — 90707 MMR VACCINE SC: CPT | Performed by: STUDENT IN AN ORGANIZED HEALTH CARE EDUCATION/TRAINING PROGRAM

## 2019-12-08 PROCEDURE — 25000128 H RX IP 250 OP 636: Performed by: STUDENT IN AN ORGANIZED HEALTH CARE EDUCATION/TRAINING PROGRAM

## 2019-12-08 PROCEDURE — 00000146 ZZHCL STATISTIC GLUCOSE BY METER IP

## 2019-12-08 PROCEDURE — 25000132 ZZH RX MED GY IP 250 OP 250 PS 637: Performed by: STUDENT IN AN ORGANIZED HEALTH CARE EDUCATION/TRAINING PROGRAM

## 2019-12-08 RX ORDER — ACETAMINOPHEN 325 MG/1
650 TABLET ORAL EVERY 6 HOURS PRN
Qty: 100 TABLET | Refills: 0 | COMMUNITY
Start: 2019-12-08 | End: 2020-01-16

## 2019-12-08 RX ORDER — OXYCODONE HYDROCHLORIDE 5 MG/1
5 TABLET ORAL EVERY 6 HOURS PRN
Qty: 4 TABLET | Refills: 0 | Status: SHIPPED | OUTPATIENT
Start: 2019-12-08 | End: 2020-01-16

## 2019-12-08 RX ORDER — IBUPROFEN 600 MG/1
600 TABLET, FILM COATED ORAL EVERY 6 HOURS PRN
Qty: 60 TABLET | Refills: 0 | Status: SHIPPED | OUTPATIENT
Start: 2019-12-08 | End: 2020-01-16

## 2019-12-08 RX ORDER — AMOXICILLIN 250 MG
1 CAPSULE ORAL DAILY
Qty: 100 TABLET | Refills: 0 | COMMUNITY
Start: 2019-12-08 | End: 2020-02-28

## 2019-12-08 RX ORDER — OXYCODONE HYDROCHLORIDE 5 MG/1
5-10 TABLET ORAL EVERY 6 HOURS PRN
Qty: 12 TABLET | Refills: 0 | Status: SHIPPED | OUTPATIENT
Start: 2019-12-08 | End: 2020-01-16

## 2019-12-08 RX ORDER — FERROUS SULFATE 325(65) MG
325 TABLET ORAL
Qty: 90 TABLET | Refills: 0 | Status: SHIPPED | OUTPATIENT
Start: 2019-12-08 | End: 2020-01-16

## 2019-12-08 RX ADMIN — IBUPROFEN 600 MG: 600 TABLET ORAL at 09:33

## 2019-12-08 RX ADMIN — ACETAMINOPHEN 650 MG: 325 TABLET, FILM COATED ORAL at 02:00

## 2019-12-08 RX ADMIN — ACETAMINOPHEN 650 MG: 325 TABLET, FILM COATED ORAL at 09:33

## 2019-12-08 RX ADMIN — POLYETHYLENE GLYCOL 3350 17 G: 17 POWDER, FOR SOLUTION ORAL at 09:34

## 2019-12-08 RX ADMIN — SENNOSIDES AND DOCUSATE SODIUM 2 TABLET: 8.6; 5 TABLET ORAL at 09:33

## 2019-12-08 RX ADMIN — IBUPROFEN 600 MG: 600 TABLET ORAL at 02:00

## 2019-12-08 RX ADMIN — MEASLES, MUMPS, AND RUBELLA VIRUS VACCINE LIVE 0.5 ML: 1000; 12500; 1000 INJECTION, POWDER, LYOPHILIZED, FOR SUSPENSION SUBCUTANEOUS at 09:34

## 2019-12-08 NOTE — PLAN OF CARE
Data: Vital signs stable, postpartum assessments within normal limits.   Eating and drinking without nausea or vomiting. Has not needed any insulin today.  Up ad kwasi, and voiding without difficulty. Passing gas and having BMs.  Pumping for baby in the NICU.  Pain managed with tylenol and ibuprofen. Pt states she is comfortable.  Incision appears to be healing well, no s/s infection.  Discharge outcomes on care plan met.   Action: Review of care plan, teaching, and discharge instructions done.   Response: Patient states understanding and comfort with her discharge instructions and plan of care. All questions addressed. She will discharge to board on [facility] to be closer to her baby in NICU.
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
## 2019-12-08 NOTE — LACTATION NOTE
This note was copied from a baby's chart.  D:  I met with Khanh Grider and .  I:  I dispensed a Symphony and instructed her in its use.  I described the process for switching out her pump at discharge.  I reviewed the new handout from the [facility] on keeping breast pumps/parts clean.  I reviewed pumping recommendations, labeling, storage, logging, transport, etc.  She slept all night last night; we talked about importance of nighttime pumping.  I moved her to Maintain setting (I watched her pump and she filled a bottle).   A:  Mom has information and equipment she needs to initiate her supply and has updated cleaning guidelines.  P:  Will continue to provide lactation support.    Amber Craft, RNC, IBCLC

## 2019-12-13 ENCOUNTER — VIRTUAL VISIT (OUTPATIENT)
Dept: ENDOCRINOLOGY | Facility: CLINIC | Age: 39
End: 2019-12-13
Payer: COMMERCIAL

## 2019-12-13 DIAGNOSIS — E11.8 TYPE 2 DIABETES MELLITUS WITH COMPLICATION (H): Primary | ICD-10-CM

## 2019-12-13 PROBLEM — Z3A.28 28 WEEKS GESTATION OF PREGNANCY: Status: RESOLVED | Noted: 2019-12-04 | Resolved: 2019-12-13

## 2019-12-13 PROBLEM — O21.9 NAUSEA AND VOMITING IN PREGNANCY: Status: RESOLVED | Noted: 2019-07-05 | Resolved: 2019-12-13

## 2019-12-13 PROBLEM — O41.00X0 ANHYDRAMNIOS: Status: RESOLVED | Noted: 2019-12-02 | Resolved: 2019-12-13

## 2019-12-13 PROBLEM — O09.529 ANTEPARTUM MULTIGRAVIDA OF ADVANCED MATERNAL AGE: Status: RESOLVED | Noted: 2019-07-05 | Resolved: 2019-12-13

## 2019-12-13 PROBLEM — K59.00 CONSTIPATION DURING PREGNANCY, ANTEPARTUM: Status: RESOLVED | Noted: 2019-07-05 | Resolved: 2019-12-13

## 2019-12-13 PROBLEM — Z33.1 PREGNANCY, INCIDENTAL: Status: RESOLVED | Noted: 2019-09-10 | Resolved: 2019-12-13

## 2019-12-13 PROBLEM — O36.5920 POOR FETAL GROWTH AFFECTING MANAGEMENT OF MOTHER IN SECOND TRIMESTER: Status: RESOLVED | Noted: 2019-11-13 | Resolved: 2019-12-13

## 2019-12-13 PROBLEM — O99.619 CONSTIPATION DURING PREGNANCY, ANTEPARTUM: Status: RESOLVED | Noted: 2019-07-05 | Resolved: 2019-12-13

## 2019-12-13 PROBLEM — O09.90 SUPERVISION OF HIGH RISK PREGNANCY, ANTEPARTUM: Status: RESOLVED | Noted: 2019-07-05 | Resolved: 2019-12-13

## 2019-12-13 NOTE — PROGRESS NOTES
Endocrine TELEPHONE VISIT NOTE    Attending Assessment/Plan :     Diabetes mellitus type 2.  She is off insulin but on metformin.  Recommend to keep appt with Mallika Borja scheduled for 12/19/19 .  Future long term DM follow up appts may continue with Mallika Borja until stabilized.      Post partum one week, lactating    Metabolic syndrome with high TG, low HDL, DM    Need for primary care is apparent - this will also be an issue to be addressed longer term.     Start time 0701 -  # [id number] with lots of audio problems requiring me to repeat myself 3-4 times; patient finally reached [phone]  Stop time 0716  Total time   14 minutes including all the time with ; 7 minutes with patient    Lori Enamorado MD       Chief complaint/ HISTORY OF PRESENT ILLNESS  Marni presents for follow up of DM2 with recent  pregnancy, metabolic syndrome. She was seen by me along with  today. I last saw her via telephone visit 11/8/19    Prediabetes in 2009 progressed to diabetes in 2016.  In 10/17 the A1c got to 10%.   HgbA1c was 9.1% from 5/18.    She has been on and off insulin associated with pregnancy.  Insulin was started with the recent pregnancy 7/19.    She was hospitalized 12/2-12/8/19 .  Intrauterine pregnancy 28w2d was treated with C section 12/5/19  .The pregnancy was complicated by anhydramnios, fetal growth restriction. The infant was a 500 gram female. Baby is in the NICU.  Today she says she stopped insulin when she left the hospital. She continues on metformin. She is lactating.      Prediabetes in 2009 progressed to diabetes in 2016.  In 10/17 the A1c got to 10%.   HgbA1c was 9.1% from 5/18.        Current regimen:  Metformin 500 2 tablets ? Dose bid - ? 1000 bid    She continues to check BS-- We did not have a download today and Marni was also not reading from her meter when she reported her Recent BSs-- she says she  Is checking twice/day AM and at night.   103-106 both AM  "fasting at \"at night'    We have the following labs:   07: prolactin 17.2, TSH 1.7, insulin 21.4  09: insulin 19.4\, , HDL 21, cholesterol 184  10/28/09: prolactin 13.1, 25OHD 17.1, HgbA1c 5.9%  6/3//10: FSH 6  10/19/17: HgbA1c 10%  18: HgbA1c 9.1%  18: HgbA1c 7.8%, , HDL 27, cholesterol 178  19 HgbA1c 8.6% , glucose 269, creatinine 0.45, at diagnosis of pregnancy   HgbA1c 6%   19: creatinine 0.34,     REVIEW OF SYSTEMS  A little sad  pain    Past Medical History  Past Medical History:   Diagnosis Date     DM2 (diabetes mellitus, type 2) (H)      High triglycerides     600+     HPV (human papilloma virus) infection      Infertility, female 2009     Miscarriage 2018     Obesity 2012     Prediabetes      Primary amenorrhea      Past Surgical History:   Procedure Laterality Date      SECTION N/A 2019    Procedure:  SECTION;  Surgeon: Vivienne Laura MD;  Location: [facility]       Medications    Current Outpatient Medications   Medication Sig Dispense Refill     acetaminophen (TYLENOL) 325 MG tablet Take 2 tablets (650 mg) by mouth every 6 hours as needed for mild pain Start after Delivery. 100 tablet 0     blood glucose (NO BRAND SPECIFIED) test strip Use to test blood sugar 4 times daily or as directed. 150 strip 11     Cholecalciferol (VITAMIN D) 2000 units CAPS Take 1 capsule by mouth daily Take one capsule daily. 90 capsule 3     docusate sodium (COLACE) 100 MG capsule Take 1 capsule (100 mg) by mouth 2 times daily 90 capsule 6     ferrous sulfate (FEROSUL) 325 (65 Fe) MG tablet Take 1 tablet (325 mg) by mouth daily (with breakfast) 90 tablet 0     ibuprofen (ADVIL/MOTRIN) 600 MG tablet Take 1 tablet (600 mg) by mouth every 6 hours as needed for moderate pain Start after delivery 60 tablet 0     insulin pen needle (32G X 4 MM) 32G X 4 MM miscellaneous Use 1 pen needles daily as directed. 100 each 3     insulin " "syringe-needle U-100 (30G X 1/2\" 0.3 ML) 30G X 1/2\" 0.3 ML miscellaneous Use  syringes daily or as directed. 100 each 3     metFORMIN (GLUCOPHAGE) 500 MG tablet Take 1,000 mg by mouth 2 times daily (with meals)        oxyCODONE (ROXICODONE) 5 MG tablet Take 1-2 tablets (5-10 mg) by mouth every 6 hours as needed 12 tablet 0     Prenatal Vit-Fe Fumarate-FA (PRENATAL MULTIVITAMIN W/IRON) 27-0.8 MG tablet Take 1 tablet by mouth daily 90 tablet 3     senna-docusate (SENOKOT-S/PERICOLACE) 8.6-50 MG tablet Take 1 tablet by mouth daily Start after delivery. 100 tablet 0     vitamin B6 (PYRIDOXINE) 25 MG tablet Take 1 tablet (25 mg) by mouth every 8 hours (Patient not taking: Reported on 11/25/2019) 90 tablet 6     vitamin D3 (CHOLECALCIFEROL) 400 units capsule Take 2 capsules (800 Units) by mouth daily 90 capsule 3       Physical Exam  LMP 05/11/2019   There is no height or weight on file to calculate BMI.                                                                                                             Limited  -  "

## 2019-12-16 LAB — COPATH REPORT: NORMAL

## 2019-12-17 ENCOUNTER — TELEPHONE (OUTPATIENT)
Dept: OBGYN | Facility: CLINIC | Age: 39
End: 2019-12-17

## 2019-12-17 NOTE — TELEPHONE ENCOUNTER
Received call from Lea a public health nurse. States that she saw Marni for a home visit and had concerns about  incision. States there is about an inch opening and patient reports pus.     Left message for Lea that we will call and get Marni in for an appointment.    Left message for patient with  to call back and talk to nurses. Can see Turner tomorrow for incision check.

## 2019-12-17 NOTE — TELEPHONE ENCOUNTER
Spoke with Marni via   Services and offered appointment tomorrow at 1:15pm with Dr. Tompkins to evaluate her incision.  This time will not work with her as she gets medical rides to the NICU everyday and doesn't arrive until 1:20pm and right after she gets there, the doctors round and give her updates on her baby.    Offered appointment at 2:15pm with Dr. Caraballo instead. She agreed with this appointment and was scheduled.    Advised she keep the area covered with a bandage and she agreed with plan.

## 2019-12-18 ENCOUNTER — OFFICE VISIT (OUTPATIENT)
Dept: OBGYN | Facility: CLINIC | Age: 39
End: 2019-12-18
Attending: OBSTETRICS & GYNECOLOGY
Payer: COMMERCIAL

## 2019-12-18 VITALS
BODY MASS INDEX: 31.93 KG/M2 | DIASTOLIC BLOOD PRESSURE: 86 MMHG | HEART RATE: 81 BPM | HEIGHT: 64 IN | SYSTOLIC BLOOD PRESSURE: 126 MMHG

## 2019-12-18 DIAGNOSIS — G89.18 ACUTE POST-OPERATIVE PAIN: ICD-10-CM

## 2019-12-18 DIAGNOSIS — K59.01 SLOW TRANSIT CONSTIPATION: Primary | ICD-10-CM

## 2019-12-18 RX ORDER — ACETAMINOPHEN 325 MG/1
325-650 TABLET ORAL EVERY 6 HOURS PRN
Qty: 60 TABLET | Refills: 1 | Status: SHIPPED | OUTPATIENT
Start: 2019-12-18

## 2019-12-18 RX ORDER — POLYETHYLENE GLYCOL 3350 17 G/17G
1 POWDER, FOR SOLUTION ORAL DAILY
Qty: 1 BOTTLE | Refills: 1 | Status: SHIPPED | OUTPATIENT
Start: 2019-12-18

## 2019-12-18 RX ORDER — IBUPROFEN 600 MG/1
600 TABLET, FILM COATED ORAL EVERY 6 HOURS PRN
Qty: 60 TABLET | Refills: 1 | Status: SHIPPED | OUTPATIENT
Start: 2019-12-18 | End: 2020-01-16 | Stop reason: ALTCHOICE

## 2019-12-18 ASSESSMENT — ANXIETY QUESTIONNAIRES
GAD7 TOTAL SCORE: 4
2. NOT BEING ABLE TO STOP OR CONTROL WORRYING: SEVERAL DAYS
7. FEELING AFRAID AS IF SOMETHING AWFUL MIGHT HAPPEN: SEVERAL DAYS
5. BEING SO RESTLESS THAT IT IS HARD TO SIT STILL: NOT AT ALL
3. WORRYING TOO MUCH ABOUT DIFFERENT THINGS: SEVERAL DAYS
6. BECOMING EASILY ANNOYED OR IRRITABLE: NOT AT ALL
1. FEELING NERVOUS, ANXIOUS, OR ON EDGE: NOT AT ALL

## 2019-12-18 ASSESSMENT — PATIENT HEALTH QUESTIONNAIRE - PHQ9
5. POOR APPETITE OR OVEREATING: SEVERAL DAYS
SUM OF ALL RESPONSES TO PHQ QUESTIONS 1-9: 8

## 2019-12-18 NOTE — LETTER
"2019    RE: Yanet Reyes Avila  [address]  [facility] [address]     Dear Colleague,    Thank you for referring your patient, Yanet Reyes Avila, to the [facility] at [facility]. Please see a copy of my visit note below.    S: 40 yo  presents for incision check. She is s/p classical c section on 19 for 28 week IUP with severe IUGR. Pregnancy also complicated by T2DM and pre E without SF.   Doing well but noticed increased drainage from incision with small opening.    OB History    Para Term  AB Living   2 1 0 1 1 1   SAB TAB Ectopic Multiple Live Births   1 0 0 0 1      # Outcome Date GA Lbr Robin/2nd Weight Sex Delivery Anes PTL Lv   2  19 28w2d   F CS-Classical   STEVE      Name: REYES AVILA,FEMALE-PERLA      Apgar1: 2  Apgar5: 7   1 SAB 18             /86   Pulse 81   Ht 1.626 m (5' 4\")   BMI 31.93 kg/m     Appears well  Abdomen soft NT ND  Pfannenstiel is CDI and well healing with superior edge 1 mm everted compared to inferior edge. Bright red granulation tissue at superior edge. Nontender. No drainage now. Intact without any separation at this time.   No induration, no erythema    Granulation tissue treated with silver nitrate    A/P: normal exam POD 13. Continue cares and hygiene. RTC 2 days.   Yue Caraballo MD    "

## 2019-12-19 ENCOUNTER — OFFICE VISIT (OUTPATIENT)
Dept: ENDOCRINOLOGY | Facility: CLINIC | Age: 39
End: 2019-12-19
Payer: COMMERCIAL

## 2019-12-19 VITALS
DIASTOLIC BLOOD PRESSURE: 79 MMHG | HEIGHT: 64 IN | WEIGHT: 175.6 LBS | HEART RATE: 86 BPM | SYSTOLIC BLOOD PRESSURE: 115 MMHG | BODY MASS INDEX: 29.98 KG/M2

## 2019-12-19 DIAGNOSIS — E11.8 TYPE 2 DIABETES MELLITUS WITH COMPLICATION (H): Primary | ICD-10-CM

## 2019-12-19 LAB — HBA1C MFR BLD: 5.2 % (ref 4.3–6)

## 2019-12-19 ASSESSMENT — ANXIETY QUESTIONNAIRES: GAD7 TOTAL SCORE: 4

## 2019-12-19 ASSESSMENT — MIFFLIN-ST. JEOR: SCORE: 1466.52

## 2019-12-19 ASSESSMENT — PAIN SCALES - GENERAL: PAINLEVEL: SEVERE PAIN (6)

## 2019-12-19 NOTE — PROGRESS NOTES
"S: 40 yo  presents for incision check. She is s/p classical c section on 19 for 28 week IUP with severe IUGR. Pregnancy also complicated by T2DM and pre E without SF.   Doing well but noticed increased drainage from incision with small opening.    OB History    Para Term  AB Living   2 1 0 1 1 1   SAB TAB Ectopic Multiple Live Births   1 0 0 0 1      # Outcome Date GA Lbr Robin/2nd Weight Sex Delivery Anes PTL Lv   2  19 28w2d   F CS-Classical   STEVE      Name: REYES AVILA,FEMALE-PERLA      Apgar1: 2  Apgar5: 7   1 SAB 18             /86   Pulse 81   Ht 1.626 m (5' 4\")   BMI 31.93 kg/m    Appears well  Abdomen soft NT ND  Pfannenstiel is CDI and well healing with superior edge 1 mm everted compared to inferior edge. Bright red granulation tissue at superior edge. Nontender. No drainage now. Intact without any separation at this time.   No induration, no erythema    Granulation tissue treated with silver nitrate    A/P: normal exam POD 13. Continue cares and hygiene. RTC 2 days.   Yue Caraballo MD    "

## 2019-12-19 NOTE — LETTER
12/19/2019       RE: Yanet Reyes Avila  [address] [facility] [address]     Dear Colleague,    Thank you for referring your patient, Yanet Reyes Avila, to the [facility] ENDOCRINOLOGY at [facility]. Please see a copy of my visit note below.    Diabetes Consult Note    Yanet Reyes Avila is a 39 year old female who recently delivered her first child, a baby girl delivered at just 28w2d secondary to IUGR and non reactive NST.  Marni has had prediabetes since 2009 and met diagnoses with type 2 in 2016.  Her last A1C prior to pregnancy was 9.1 on 5/18/2018.  She was found to be pregnant 6/21/2019 and started on insulin the following month when A1C was 8.2%.  She started working with [facility] [employer] in late July and on her own and started taking some old Novolog she had each morning but BG was elevated.  She then began to take 16 units NPH each evening. By the time she met Dr. Enamorado in September of this year, she was taking 42 units of NPH insulin each evening and A1C was down to 6.0. By the time of delivery NPH was 55 units each evening and most BG were at goal.      Since delivery however, Marni has not used insulin.  She is taking Metformin 500 mg 1 tablet bid.  She is pumping breastmilk about every 4 hours for her baby until she has to return to work.  Baby is struggling in NICU but can take her milk.  She is checking her BG twice daily.    A1C today is 5.2.    At times she feels her BG is low with shaking and sweating when nursing; she has not checked, but feels better with milk, watered down juice or even water.    She has to eat and drink when pumping.      We reviewed glucometer data together.  It revealed:    In the mornings: 122 155 119 117 133 125 120 114 113 113  In evenings: 118 112 106 129 97 94      She has a primary care provider at [facility] and would like to follow-up there as near her home.      Marni's  has a past medical history of DM2  "(diabetes mellitus, type 2) (H) (2016), High triglycerides, HPV (human papilloma virus) infection (2019), Infertility, female (2009), Miscarriage (06/21/2018), Obesity (2012), Prediabetes (2009), and Primary amenorrhea (2007). She also has no past medical history of Arthritis, Depressive disorder, Heart disease, Hypertension, or Thyroid disease.  Immunization History   Administered Date(s) Administered     HepB-Adult 12/04/2019     Influenza Vaccine IM > 6 months Valent IIV4 10/08/2019     MMR 12/08/2019     TDAP Vaccine (Adacel) 12/04/2019     Current Outpatient Medications   Medication     acetaminophen (TYLENOL) 325 MG tablet     acetaminophen (TYLENOL) 325 MG tablet     blood glucose (NO BRAND SPECIFIED) test strip     Cholecalciferol (VITAMIN D) 2000 units CAPS     docusate sodium (COLACE) 100 MG capsule     ferrous sulfate (FEROSUL) 325 (65 Fe) MG tablet     ibuprofen (ADVIL/MOTRIN) 600 MG tablet     ibuprofen (ADVIL/MOTRIN) 600 MG tablet     metFORMIN (GLUCOPHAGE) 500 MG tablet     oxyCODONE (ROXICODONE) 5 MG tablet     polyethylene glycol (MIRALAX/GLYCOLAX) powder     Prenatal Vit-Fe Fumarate-FA (PRENATAL MULTIVITAMIN W/IRON) 27-0.8 MG tablet     senna-docusate (SENOKOT-S/PERICOLACE) 8.6-50 MG tablet     vitamin D3 (CHOLECALCIFEROL) 400 units capsule     insulin pen needle (32G X 4 MM) 32G X 4 MM miscellaneous     insulin syringe-needle U-100 (30G X 1/2\" 0.3 ML) 30G X 1/2\" 0.3 ML miscellaneous     vitamin B6 (PYRIDOXINE) 25 MG tablet     No current facility-administered medications for this visit.          Marni's SH: Never smoked. Unemployed.       Marni's family history includes Diabetes in her mother and sister.    ROS:   Patient denies any fevers, chills or sweats as well as any changes in vision, problems with floaters or field cuts in vision, pain or problems with dentition, new or different headaches.  Patient denies symptoms of hypo and hyperglycemia except as above.   Patients denies marked fatigue, " "cough, shortness of breath, chest pain or pressure.  There has been no pain with or other changes in urination or  itching or pain in genital areas.  Patient denies any noted swelling in feet, ankles or otherwise, loss of sensation or pain  in feet or other areas.    Patient also denies current difficulties with depressed mood, anhedonia or worrying too much.        Exam:    There were no vitals taken for this visit.    General: Pleasant, well nourished and hydrated female in NAD.   Psych:  Mood is \"good,\" affect is appropriate.  Thought form and content are fluid and coherent.    HEENT: Eyes and sclera are clear. Extraocular movements are grossly intact without proptosis.  Nares are patent, mucous membranes moist.  Neck: No masses or JVD are noted.    Resp: Easy and unlabored breathing.   Neuro: Alert and oriented, communicating clearly.   Ext: no swelling or edema  Data:      Last Basic Metabolic Panel:  Lab Results   Component Value Date     12/02/2019      Lab Results   Component Value Date    POTASSIUM 3.5 12/02/2019     Lab Results   Component Value Date    CHLORIDE 109 12/02/2019     Lab Results   Component Value Date    SEPIDEH 9.0 12/02/2019     Lab Results   Component Value Date    CO2 21 12/02/2019     Lab Results   Component Value Date    BUN 8 12/02/2019     Lab Results   Component Value Date    CR 0.34 12/02/2019     Lab Results   Component Value Date    GLC 88 12/02/2019       GFR Estimate   Date Value Ref Range Status   12/02/2019 >90 >60 mL/min/[1.73_m2] Final     Comment:     Non  GFR Calc  Starting 12/18/2018, serum creatinine based estimated GFR (eGFR) will be   calculated using the Chronic Kidney Disease Epidemiology Collaboration   (CKD-EPI) equation.     07/19/2019 >90 >60 mL/min/[1.73_m2] Final     Comment:     Non  GFR Calc  Starting 12/18/2018, serum creatinine based estimated GFR (eGFR) will be   calculated using the Chronic Kidney Disease Epidemiology " Collaboration   (CKD-EPI) equation.       GFR Estimate If Black   Date Value Ref Range Status   12/02/2019 >90 >60 mL/min/[1.73_m2] Final     Comment:      GFR Calc  Starting 12/18/2018, serum creatinine based estimated GFR (eGFR) will be   calculated using the Chronic Kidney Disease Epidemiology Collaboration   (CKD-EPI) equation.     07/19/2019 >90 >60 mL/min/[1.73_m2] Final     Comment:      GFR Calc  Starting 12/18/2018, serum creatinine based estimated GFR (eGFR) will be   calculated using the Chronic Kidney Disease Epidemiology Collaboration   (CKD-EPI) equation.           Lab Results   Component Value Date    A1C 8.2 (H) 07/05/2019    HEMOGLOBINA1 6.0 09/10/2019           Assessment/Plan:      Marni is a 38 yo F with long standing type 2 diabetes recently complicating pregnancy though brought to goal with high doses of insulin.  Her type 2 diabetes is doing reasonably well on low dose Metformin only while breastfeeding post partum.    She has multiple fasting values c/w with ongoing type 2 diabetes though recent A1C was quite low.  Advised to follow with PCP every 3 months and likely will need to increase Metformin when quits breastfeeding to keep A1C <7.0.  She is welcome to return to our clinic but will see clinic near her home.  If planning another pregnancy advised to come to clinic prior to conception.  Preconception blood sugars advised to be <95 fasting, <140 1 hour postprandial.  She should see us to achieve this prior to further conception.  She verbalizes understanding.   Advised see provider anytime seeing BG >200.    It is my privilege to be involved in the care of the above patient.     Mallika Borja PA-C, MPAS  [facility]  Diabetes, Endocrinology, and Metabolism  [phone] Appointments/Nurse  [phone] Fax  [phone] pager  [phone] nurse line                              >50% of 30 minute visit spent in face to face counseling,  education and coordination of care related to options for better glycemic control as well as preventing, detecting, and treating hypoglycemia.      It is my privilege to be involved in the care of the above patient.     Mallika Borja PA-C, MPAS  [facility]  Diabetes, Endocrinology, and Metabolism  [phone] Appointments/Nurse  [phone] pager  [phone] nurse line    This note was completed in part using Dragon voice recognition, and may contain word and grammatical errors.

## 2019-12-19 NOTE — PROGRESS NOTES
Diabetes Consult Note    Yanet Reyes Avila is a 39 year old female who recently delivered her first child, a baby girl delivered at just 28w2d secondary to IUGR and non reactive NST.  Marni has had prediabetes since 2009 and met diagnoses with type 2 in 2016.  Her last A1C prior to pregnancy was 9.1 on 5/18/2018.  She was found to be pregnant 6/21/2019 and started on insulin the following month when A1C was 8.2%.  She started working with [employer] in late July and on her own and started taking some old Novolog she had each morning but BG was elevated.  She then began to take 16 units NPH each evening. By the time she met Dr. Enamorado in September of this year, she was taking 42 units of NPH insulin each evening and A1C was down to 6.0. By the time of delivery NPH was 55 units each evening and most BG were at goal.      Since delivery however, Marni has not used insulin.  She is taking Metformin 500 mg 1 tablet bid.  She is pumping breastmilk about every 4 hours for her baby until she has to return to work.  Baby is struggling in NICU but can take her milk.  She is checking her BG twice daily.    A1C today is 5.2.    At times she feels her BG is low with shaking and sweating when nursing; she has not checked, but feels better with milk, watered down juice or even water.    She has to eat and drink when pumping.      We reviewed glucometer data together.  It revealed:    In the mornings: 122 155 119 117 133 125 120 114 113 113  In evenings: 118 112 106 129 97 94      She has a primary care provider at [facility] and would like to follow-up there as near her home.      Marni's  has a past medical history of DM2 (diabetes mellitus, type 2) (H) (2016), High triglycerides, HPV (human papilloma virus) infection (2019), Infertility, female (2009), Miscarriage (06/21/2018), Obesity (2012), Prediabetes (2009), and Primary amenorrhea (2007). She also has no past medical history of Arthritis,  "Depressive disorder, Heart disease, Hypertension, or Thyroid disease.  Immunization History   Administered Date(s) Administered     HepB-Adult 12/04/2019     Influenza Vaccine IM > 6 months Valent IIV4 10/08/2019     MMR 12/08/2019     TDAP Vaccine (Adacel) 12/04/2019     Current Outpatient Medications   Medication     acetaminophen (TYLENOL) 325 MG tablet     acetaminophen (TYLENOL) 325 MG tablet     blood glucose (NO BRAND SPECIFIED) test strip     Cholecalciferol (VITAMIN D) 2000 units CAPS     docusate sodium (COLACE) 100 MG capsule     ferrous sulfate (FEROSUL) 325 (65 Fe) MG tablet     ibuprofen (ADVIL/MOTRIN) 600 MG tablet     ibuprofen (ADVIL/MOTRIN) 600 MG tablet     metFORMIN (GLUCOPHAGE) 500 MG tablet     oxyCODONE (ROXICODONE) 5 MG tablet     polyethylene glycol (MIRALAX/GLYCOLAX) powder     Prenatal Vit-Fe Fumarate-FA (PRENATAL MULTIVITAMIN W/IRON) 27-0.8 MG tablet     senna-docusate (SENOKOT-S/PERICOLACE) 8.6-50 MG tablet     vitamin D3 (CHOLECALCIFEROL) 400 units capsule     insulin pen needle (32G X 4 MM) 32G X 4 MM miscellaneous     insulin syringe-needle U-100 (30G X 1/2\" 0.3 ML) 30G X 1/2\" 0.3 ML miscellaneous     vitamin B6 (PYRIDOXINE) 25 MG tablet     No current facility-administered medications for this visit.          Marni's SH: Never smoked. Unemployed.       Marni's family history includes Diabetes in her mother and sister.    ROS:   Patient denies any fevers, chills or sweats as well as any changes in vision, problems with floaters or field cuts in vision, pain or problems with dentition, new or different headaches.  Patient denies symptoms of hypo and hyperglycemia except as above.   Patients denies marked fatigue, cough, shortness of breath, chest pain or pressure.  There has been no pain with or other changes in urination or  itching or pain in genital areas.  Patient denies any noted swelling in feet, ankles or otherwise, loss of sensation or pain  in feet or other areas.    Patient " "also denies current difficulties with depressed mood, anhedonia or worrying too much.        Exam:    There were no vitals taken for this visit.    General: Pleasant, well nourished and hydrated female in NAD.   Psych:  Mood is \"good,\" affect is appropriate.  Thought form and content are fluid and coherent.    HEENT: Eyes and sclera are clear. Extraocular movements are grossly intact without proptosis.  Nares are patent, mucous membranes moist.  Neck: No masses or JVD are noted.    Resp: Easy and unlabored breathing.   Neuro: Alert and oriented, communicating clearly.   Ext: no swelling or edema  Data:      Last Basic Metabolic Panel:  Lab Results   Component Value Date     12/02/2019      Lab Results   Component Value Date    POTASSIUM 3.5 12/02/2019     Lab Results   Component Value Date    CHLORIDE 109 12/02/2019     Lab Results   Component Value Date    SEPIDEH 9.0 12/02/2019     Lab Results   Component Value Date    CO2 21 12/02/2019     Lab Results   Component Value Date    BUN 8 12/02/2019     Lab Results   Component Value Date    CR 0.34 12/02/2019     Lab Results   Component Value Date    GLC 88 12/02/2019       GFR Estimate   Date Value Ref Range Status   12/02/2019 >90 >60 mL/min/[1.73_m2] Final     Comment:     Non  GFR Calc  Starting 12/18/2018, serum creatinine based estimated GFR (eGFR) will be   calculated using the Chronic Kidney Disease Epidemiology Collaboration   (CKD-EPI) equation.     07/19/2019 >90 >60 mL/min/[1.73_m2] Final     Comment:     Non  GFR Calc  Starting 12/18/2018, serum creatinine based estimated GFR (eGFR) will be   calculated using the Chronic Kidney Disease Epidemiology Collaboration   (CKD-EPI) equation.       GFR Estimate If Black   Date Value Ref Range Status   12/02/2019 >90 >60 mL/min/[1.73_m2] Final     Comment:      GFR Calc  Starting 12/18/2018, serum creatinine based estimated GFR (eGFR) will be   calculated using the " Chronic Kidney Disease Epidemiology Collaboration   (CKD-EPI) equation.     07/19/2019 >90 >60 mL/min/[1.73_m2] Final     Comment:      GFR Calc  Starting 12/18/2018, serum creatinine based estimated GFR (eGFR) will be   calculated using the Chronic Kidney Disease Epidemiology Collaboration   (CKD-EPI) equation.           Lab Results   Component Value Date    A1C 8.2 (H) 07/05/2019    HEMOGLOBINA1 6.0 09/10/2019           Assessment/Plan:      Marni is a 40 yo F with long standing type 2 diabetes recently complicating pregnancy though brought to goal with high doses of insulin.  Her type 2 diabetes is doing reasonably well on low dose Metformin only while breastfeeding post partum.    She has multiple fasting values c/w with ongoing type 2 diabetes though recent A1C was quite low.  Advised to follow with PCP every 3 months and likely will need to increase Metformin when quits breastfeeding to keep A1C <7.0.  She is welcome to return to our clinic but will see clinic near her home.  If planning another pregnancy advised to come to clinic prior to conception.  Preconception blood sugars advised to be <95 fasting, <140 1 hour postprandial.  She should see us to achieve this prior to further conception.  She verbalizes understanding.   Advised see provider anytime seeing BG >200.    It is my privilege to be involved in the care of the above patient.     Mallika Borja PA-C, [facility]  Diabetes, Endocrinology, and Metabolism  [phone] Appointments/Nurse  [phone] Fax  [phone] pager  [phone] nurse line                              >50% of 30 minute visit spent in face to face counseling, education and coordination of care related to options for better glycemic control as well as preventing, detecting, and treating hypoglycemia.      It is my privilege to be involved in the care of the above patient.     Mallika Borja PA-C, [facility]  [place]  Diabetes, Endocrinology, and Metabolism  [phone] Appointments/Nurse  [phone] pager  [phone] nurse line    This note was completed in part using Dragon voice recognition, and may contain word and grammatical errors.

## 2019-12-19 NOTE — PATIENT INSTRUCTIONS
Felicidades!    Jeffrey diabetes esta garth controlada!    Cualqueir pregunta avise!    Sigue con primary care.     My best wishes,    Mallika Borja PA-C, MPAS  [facility]  Diabetes, Endocrinology, and Metabolism  [phone] Appointments/Nurse  [phone] Fax  [phone] nurse line  [phone] URGENTafter hours/weekend Endocrinologist on call                    You have earned this graduation by achieving diabetes related goals and stability.    It has been a pleasure serving you.  Please use the lessons learned in the diabetes clinic as a base on which to build a lifetime of better health and wellness.  You should continue to regularly follow up with your primary care provider for diabetes management.    Please schedule appointment with your primary care provider within the next 3 months.   We have refilled all of your diabetes-related medication for the next year.    Future changes and refills should come from your primary care provider.     The diabetes care team remains available to you for future consultation should you and your doctor have new questions or concerns.

## 2019-12-20 ENCOUNTER — OFFICE VISIT (OUTPATIENT)
Dept: OBGYN | Facility: CLINIC | Age: 39
End: 2019-12-20
Attending: OBSTETRICS & GYNECOLOGY
Payer: COMMERCIAL

## 2019-12-20 VITALS
BODY MASS INDEX: 30.14 KG/M2 | HEART RATE: 80 BPM | SYSTOLIC BLOOD PRESSURE: 108 MMHG | DIASTOLIC BLOOD PRESSURE: 73 MMHG | HEIGHT: 64 IN

## 2019-12-20 DIAGNOSIS — Z98.891 STATUS POST CESAREAN DELIVERY: Primary | ICD-10-CM

## 2019-12-20 PROCEDURE — T1013 SIGN LANG/ORAL INTERPRETER: HCPCS | Mod: U3,ZF

## 2019-12-20 PROCEDURE — G0463 HOSPITAL OUTPT CLINIC VISIT: HCPCS | Mod: ZF

## 2019-12-20 NOTE — PROGRESS NOTES
"S: doing well except feels like wearing the binder lower has rubbed her incision and caused some irritation. No drainage. Infant is not doing very well in NICU.     We discussed long history of primary infertility and now spontaneous conception. Discussed post partum fertility in the setting of recent classical .     OB History    Para Term  AB Living   2 1 0 1 1 1   SAB TAB Ectopic Multiple Live Births   1 0 0 0 1      # Outcome Date GA Lbr Robin/2nd Weight Sex Delivery Anes PTL Lv   2  19 28w2d   F CS-Classical   STEVE      Name: REYES AVILA,FEMALE-PERLA      Apgar1: 2  Apgar5: 7   1 SAB 18             Past Medical History:   Diagnosis Date     DM2 (diabetes mellitus, type 2) (H)      High triglycerides     600+     HPV (human papilloma virus) infection 2019     Infertility, female 2009     Miscarriage 2018     Obesity 2012     Prediabetes 2009     Primary amenorrhea      /73   Pulse 80   Ht 1.626 m (5' 4\")   BMI 30.14 kg/m    Appears well   Abdomen soft NT ND  Incision CDI area treated with silver nitrate is involuting nicely    A/P:  s/p primary classical  on 19 for IUGR with persistent reversed end diastolic flow and anhydramnios in setting of type II DM and breech presentation    Is pumping/breastfeeding  Understands to wait 18 months between deliveries  Will be interested in contraception at next visit  Current partner of 2 years with spontaneous conception after lifetime of inability to conceive.    Yue Caraballo MD      "

## 2019-12-20 NOTE — LETTER
"2019       RE: Yanet Reyes Avila  [address]  [place] [address]     Dear Colleague,    Thank you for referring your patient, Yanet Reyes Avila, to the [facility] at [facility]. Please see a copy of my visit note below.    S: doing well except feels like wearing the binder lower has rubbed her incision and caused some irritation. No drainage. Infant is not doing very well in NICU.     We discussed long history of primary infertility and now spontaneous conception. Discussed post partum fertility in the setting of recent classical .     OB History    Para Term  AB Living   2 1 0 1 1 1   SAB TAB Ectopic Multiple Live Births   1 0 0 0 1      # Outcome Date GA Lbr Robin/2nd Weight Sex Delivery Anes PTL Lv   2  19 28w2d   F CS-Classical   STEVE      Name: REYES AVILA,FEMALE-PERLA      Apgar1: 2  Apgar5: 7   1 SAB 18             Past Medical History:   Diagnosis Date     DM2 (diabetes mellitus, type 2) (H) 2016     High triglycerides     600+     HPV (human papilloma virus) infection 2019     Infertility, female 2009     Miscarriage 2018     Obesity 2012     Prediabetes 2009     Primary amenorrhea      /73   Pulse 80   Ht 1.626 m (5' 4\")   BMI 30.14 kg/m     Appears well   Abdomen soft NT ND  Incision CDI area treated with silver nitrate is involuting nicely    A/P:  s/p primary classical  on 19 for IUGR with persistent reversed end diastolic flow and anhydramnios in setting of type II DM and breech presentation    Is pumping/breastfeeding  Understands to wait 18 months between deliveries  Will be interested in contraception at next visit  Current partner of 2 years with spontaneous conception after lifetime of inability to conceive.    Yue Caraballo MD        "

## 2020-01-02 ENCOUNTER — TELEPHONE (OUTPATIENT)
Dept: ENDOCRINOLOGY | Facility: CLINIC | Age: 40
End: 2020-01-02

## 2020-01-02 NOTE — TELEPHONE ENCOUNTER
I am very surprised to see her on my telephone visit schedule for tomorrow.  I suspect this appt was made long ago and then not cancelled after she delivered.  She was recently seen by Mallika Borja and I highly doubt they intended her to have a phone visit with me so soon.  Please cancel this appt and subsequent telephone appts with me.  Mallika scheduled her to RTC to see her prn.    Lori Enamorado MD

## 2020-01-16 ENCOUNTER — OFFICE VISIT (OUTPATIENT)
Dept: OBGYN | Facility: CLINIC | Age: 40
End: 2020-01-16
Attending: OBSTETRICS & GYNECOLOGY
Payer: COMMERCIAL

## 2020-01-16 VITALS
WEIGHT: 173.66 LBS | HEIGHT: 64 IN | HEART RATE: 86 BPM | DIASTOLIC BLOOD PRESSURE: 85 MMHG | BODY MASS INDEX: 29.65 KG/M2 | SYSTOLIC BLOOD PRESSURE: 124 MMHG

## 2020-01-16 DIAGNOSIS — Z11.51 SCREENING FOR HUMAN PAPILLOMAVIRUS: ICD-10-CM

## 2020-01-16 DIAGNOSIS — B37.31 YEAST INFECTION OF THE VAGINA: ICD-10-CM

## 2020-01-16 DIAGNOSIS — B37.2 YEAST INFECTION OF THE SKIN: ICD-10-CM

## 2020-01-16 PROCEDURE — 88175 CYTOPATH C/V AUTO FLUID REDO: CPT | Performed by: OBSTETRICS & GYNECOLOGY

## 2020-01-16 PROCEDURE — G0463 HOSPITAL OUTPT CLINIC VISIT: HCPCS | Mod: ZF

## 2020-01-16 PROCEDURE — 87624 HPV HI-RISK TYP POOLED RSLT: CPT | Performed by: OBSTETRICS & GYNECOLOGY

## 2020-01-16 PROCEDURE — G0476 HPV COMBO ASSAY CA SCREEN: HCPCS | Performed by: OBSTETRICS & GYNECOLOGY

## 2020-01-16 RX ORDER — NYSTATIN AND TRIAMCINOLONE ACETONIDE 100000; 1 [USP'U]/G; MG/G
OINTMENT TOPICAL 2 TIMES DAILY
Qty: 30 G | Refills: 1 | Status: SHIPPED | OUTPATIENT
Start: 2020-01-16

## 2020-01-16 RX ORDER — FLUCONAZOLE 150 MG/1
150 TABLET ORAL ONCE
Qty: 1 TABLET | Refills: 0 | Status: SHIPPED | OUTPATIENT
Start: 2020-01-16 | End: 2020-02-28

## 2020-01-16 ASSESSMENT — MIFFLIN-ST. JEOR: SCORE: 1457.72

## 2020-01-16 NOTE — NURSING NOTE
Chief Complaint   Patient presents with     Postpartum Care     SUBJECTIVE:   Yanet Reyes Avila is here for her 6-week postpartum checkup.     PHQ-9 score:   Hx of Abuse:  No    Delivery Date: 2019.    Delivering provider:  Vivienne Laura MD.    Type of delivery:  .     Delivery complications: premature   Infant gender:  girl, weight 1 pounds 1.6 oz.  Feeding Method:  Bottlefed.  Complications reported with feeding:  none, infant thriving .    Bleeding:  None.  Duration:  3 weeks.  Menses resumed:  No  Bowel/Urinary problems:  Yes     Contraception Planned:  Natural Family Planning   She  has not had intercourse since delivery..

## 2020-01-16 NOTE — LETTER
[facility] PROFESSIONAL BLDG [facility] [address]  [address]  [address]  [place] [id number] [phone]          2020    RE:  Yanet Reyes Avila                                                                                                                                                       [address]  [place] [address]            To whom it may concern:    Yanet Reyes Avila is under my professional care for her pregnancy and  section.       Due to complications of her delivery she is unable to return to work for 3 months following her surgery.     Sincerely,        Vivienne Laura MD

## 2020-01-16 NOTE — LETTER
"2020       RE: Yanet Reyes Avila  [address]  [facility] [facility] [address]     Dear Colleague,    Thank you for referring your patient, Yanet Reyes Avila, to the [facility] at [facility]. Please see a copy of my visit note below.      Nursing Notes:   Yvette Brown RN  2020  3:05 PM  Signed  Chief Complaint   Patient presents with     Postpartum Care     SUBJECTIVE:   Yanet Reyes Avila is here for her 6-week postpartum checkup.     Hx of Abuse:  No    Delivery Date: 2019.    Delivering provider:  Vivienne Laura MD.    Type of delivery:  .     Delivery complications: premature   Infant gender:  girl, weight 1 pounds 1.6 oz.  Feeding Method: Pumping, baby still ventilated in NICU  Bleeding:  None.  Duration:  3 weeks.  Menses resumed:  No  Bowel/Urinary problems:  Yes     Contraception Planned:  Natural Family Planning   She  has not had intercourse since delivery.  ================================================================  ROS: 10 point ROS neg other than the symptoms noted above in the HPI.   Patient's  was recently diagnosed with gastric cancer and is currently getting chemo through [facility]. Prognosis sounds poor. She is struggling with helping him and being with baby. Worried about needing to return to work. States mood is okay, but has anxiety.     EXAM:  /85   Pulse 86   Ht 1.626 m (5' 4\")   Wt 78.8 kg (173 lb 10.6 oz)   BMI 29.81 kg/m       General: healthy, alert and no distress  Psych: anxiety and multiple stressors  Last PHQ-9 score on record= No Value exists for the : HP#PHQ9  Breasts:  Lactating, Nipples intact with no lesions, Non-tender and No S/S of yeast or mastitis  Abdomen: Benign, Soft, flat, non-tender, No masses, organomegaly and Diastasis less than 1-2 FB  Incision:  well healed   Vulva:  Normal genitalia and Bartholin's, Urethra, Turney's normal, erythema in groin folds  Vagina:  normal " with good muscle tone  Cervix:  no lesions.    Uterus:  fully involuted and non-tender    Adnexa:  Within normal limits and No masses, nodularity, tenderness  Recto-vaginal:   anus normal      ASSESSMENT:   Encounter Diagnoses   Name Primary?     Screening for human papillomavirus Yes     Yeast infection of the skin      Yeast infection of the vagina      Routine postpartum follow-up       Normal postpartum exam after CS for severe IUGR, anhydramnios at 28wks. 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      PLAN:  Orders Placed This Encounter   Procedures     Pap imaged thin layer diagnostic with HPV (select HPV order below)     HPV High Risk Types DNA Cervical      Orders Placed This Encounter   Medications     nystatin-triamcinolone (MYCOLOG) 497966-7.1 UNIT/GM-% external ointment     Sig: Apply topically 2 times daily     Dispense:  30 g     Refill:  1     fluconazole (DIFLUCAN) 150 MG tablet     Sig: Take 1 tablet (150 mg) by mouth once for 1 dose     Dispense:  1 tablet     Refill:  0      Reviewed support options and treatment of postpartum depression given her significant stressors right now. Letter given to take 3 months off work. Declines medication for depression/anxiety. Had paperwork with her for insurance issues, recommend discussing with SW in NICU.     Follow up as needed    Vivienne Laura MD

## 2020-01-17 NOTE — PROGRESS NOTES
"  Nursing Notes:   Yvette Brown RN  2020  3:05 PM  Signed  Chief Complaint   Patient presents with     Postpartum Care     SUBJECTIVE:   Yanet Reyes Avila is here for her 6-week postpartum checkup.     Hx of Abuse:  No    Delivery Date: 2019.    Delivering provider:  Vivienne Laura MD.    Type of delivery:  .     Delivery complications: premature   Infant gender:  girl, weight 1 pounds 1.6 oz.  Feeding Method: Pumping, baby still ventilated in NICU  Bleeding:  None.  Duration:  3 weeks.  Menses resumed:  No  Bowel/Urinary problems:  Yes     Contraception Planned:  Natural Family Planning   She  has not had intercourse since delivery.       ================================================================  ROS: 10 point ROS neg other than the symptoms noted above in the HPI.   Patient's  was recently diagnosed with gastric cancer and is currently getting chemo through [facility]. Prognosis sounds poor. She is struggling with helping him and being with baby. Worried about needing to return to work. States mood is okay, but has anxiety.     EXAM:  /85   Pulse 86   Ht 1.626 m (5' 4\")   Wt 78.8 kg (173 lb 10.6 oz)   BMI 29.81 kg/m      General: healthy, alert and no distress  Psych: anxiety and multiple stressors  Last PHQ-9 score on record= No Value exists for the : HP#PHQ9  Breasts:  Lactating, Nipples intact with no lesions, Non-tender and No S/S of yeast or mastitis  Abdomen: Benign, Soft, flat, non-tender, No masses, organomegaly and Diastasis less than 1-2 FB  Incision:  well healed   Vulva:  Normal genitalia and Bartholin's, Urethra, [facility]'s normal, erythema in groin folds  Vagina:  normal with good muscle tone  Cervix:  no lesions.    Uterus:  fully involuted and non-tender    Adnexa:  Within normal limits and No masses, nodularity, tenderness  Recto-vaginal:   anus normal      ASSESSMENT:   Encounter Diagnoses   Name Primary?     Screening for human papillomavirus Yes "     Yeast infection of the skin      Yeast infection of the vagina      Routine postpartum follow-up       Normal postpartum exam after CS for severe IUGR, anhydramnios at 28wks. 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      PLAN:  Orders Placed This Encounter   Procedures     Pap imaged thin layer diagnostic with HPV (select HPV order below)     HPV High Risk Types DNA Cervical      Orders Placed This Encounter   Medications     nystatin-triamcinolone (MYCOLOG) 129955-6.1 UNIT/GM-% external ointment     Sig: Apply topically 2 times daily     Dispense:  30 g     Refill:  1     fluconazole (DIFLUCAN) 150 MG tablet     Sig: Take 1 tablet (150 mg) by mouth once for 1 dose     Dispense:  1 tablet     Refill:  0      Reviewed support options and treatment of postpartum depression given her significant stressors right now. Letter given to take 3 months off work. Declines medication for depression/anxiety. Had paperwork with her for insurance issues, recommend discussing with SW in NICU.     Follow up as needed    Vivienne Laura MD

## 2020-01-21 LAB
COPATH REPORT: NORMAL
PAP: NORMAL

## 2020-01-23 LAB
FINAL DIAGNOSIS: ABNORMAL
HPV HR 12 DNA CVX QL NAA+PROBE: POSITIVE
HPV16 DNA SPEC QL NAA+PROBE: POSITIVE
HPV18 DNA SPEC QL NAA+PROBE: NEGATIVE
SPECIMEN DESCRIPTION: ABNORMAL
SPECIMEN SOURCE CVX/VAG CYTO: ABNORMAL

## 2020-01-24 ENCOUNTER — TELEPHONE (OUTPATIENT)
Dept: ENDOCRINOLOGY | Facility: CLINIC | Age: 40
End: 2020-01-24

## 2020-01-24 NOTE — TELEPHONE ENCOUNTER
----- Message from July ESPINOZA Link sent at 1/24/2020  3:43 PM CST -----  Regarding: FW: Assure pt has f/u either at her PCP Jayce or here within 3 months to update A1C  I feel this is better addressed by a nurse? Thanks.  ----- Message -----  From: Mallika Borja PA-C  Sent: 12/26/2019   5:09 PM CST  To: [id number]  Subject: Assure pt has f/u either at her PCP Ivone#    We want to make sure diabetes remains controlled.  If she is planning another pregnancy ever, urge her to come here BEFORE GETTING pregnant again or as soon as possible when learns of pregnancy.    Thank you!  Mallika

## 2020-01-24 NOTE — TELEPHONE ENCOUNTER
Spoke w/ PT Via Polish speaking . Confirms understanding of review and recommendation from Mallika Borja, states she is not planning another pregnancy at this time.  Margo Mcmillan RN on 1/24/2020 at 3:52 PM

## 2020-02-20 ENCOUNTER — OFFICE VISIT (OUTPATIENT)
Dept: OBGYN | Facility: CLINIC | Age: 40
End: 2020-02-20
Payer: COMMERCIAL

## 2020-02-20 VITALS
HEART RATE: 102 BPM | SYSTOLIC BLOOD PRESSURE: 107 MMHG | WEIGHT: 174.7 LBS | DIASTOLIC BLOOD PRESSURE: 74 MMHG | BODY MASS INDEX: 29.82 KG/M2 | HEIGHT: 64 IN

## 2020-02-20 DIAGNOSIS — Z98.890 POSTOPERATIVE STATE: Primary | ICD-10-CM

## 2020-02-20 DIAGNOSIS — Z98.891 S/P CESAREAN SECTION: ICD-10-CM

## 2020-02-20 PROCEDURE — G0463 HOSPITAL OUTPT CLINIC VISIT: HCPCS | Mod: ZF

## 2020-02-20 RX ORDER — PRENATAL VIT/IRON FUM/FOLIC AC 27MG-0.8MG
1 TABLET ORAL DAILY
Qty: 90 TABLET | Refills: 3 | Status: SHIPPED | OUTPATIENT
Start: 2020-02-20

## 2020-02-20 ASSESSMENT — ANXIETY QUESTIONNAIRES
2. NOT BEING ABLE TO STOP OR CONTROL WORRYING: NOT AT ALL
5. BEING SO RESTLESS THAT IT IS HARD TO SIT STILL: NOT AT ALL
1. FEELING NERVOUS, ANXIOUS, OR ON EDGE: SEVERAL DAYS
3. WORRYING TOO MUCH ABOUT DIFFERENT THINGS: NOT AT ALL
7. FEELING AFRAID AS IF SOMETHING AWFUL MIGHT HAPPEN: NOT AT ALL
GAD7 TOTAL SCORE: 2
6. BECOMING EASILY ANNOYED OR IRRITABLE: SEVERAL DAYS

## 2020-02-20 ASSESSMENT — PATIENT HEALTH QUESTIONNAIRE - PHQ9: 5. POOR APPETITE OR OVEREATING: NOT AT ALL

## 2020-02-20 ASSESSMENT — MIFFLIN-ST. JEOR: SCORE: 1462.43

## 2020-02-20 NOTE — PROGRESS NOTES
"Postpartum Visit Note    S:  Yanet Reyes Avila is a 39 year old  here for return visit after her postpartum visit due to abdominal pain. Patient is postpartum from a c/s on 19 for severe IUGR and anhydramnios.    Notes that she still has abdominal pain when she uses her abdominal muscles and also when she eats. Denies fevers, chills, vomiting. Occasionally has nausea. Normal bowel movements, has them once or twice a day, occasionally with diarrhea. Denies urinary symptoms. Has not been taking anything for the pain.    O:  EXAM:  /74 (BP Location: Right arm, Patient Position: Chair)   Pulse 102   Ht 1.626 m (5' 4\")   Wt 79.2 kg (174 lb 11.2 oz)   BMI 29.99 kg/m    General: alert, oriented, sitting comfortably  Abdomen: soft, mildly tender with deep palpation around incision apices. Pfannenstiel skin incision healing well. No draining, erythema or induration.    A:   39 year old  s/p c/s on 19 Here for follow up due to persistent abdominal pain after  section. Pain is consistent with MSK pain and normal healing after surgery. Patient also with HPV pos (16, Other HR) on postpartum pap smear    P:  Recommended abdominal binder as this helped pt after surgery but is now stretched out- given script  Continue tylenol/ibuprofen and heat PRN  Continue weight lifting restriction  Recommend colposcopy for HPV+, will plan to schedule before insurance runs out     Patient discussed with Dr Lopez.     Any Ford MD  Obstetrics and Gynecology, PGY-3  2020 , 5:11 PM      The patient was seen in resident continuity clinic by Dr. Jj.  I have reviewed the history and exam, the assessment and plan were jointly made.     Kyleigh Lopez MD, FACOG                          "

## 2020-02-20 NOTE — PATIENT INSTRUCTIONS
Continue to take it slowly at home without lifting anything heavier than 20 pounds. If you are having pain after eating you could take Tums to see if they help and if they do the pain could be due to heartburn.    Heat, tylenol and ibuprofen are all good options for pain control    Please return if symptoms do not improve. Please stop at the desk to schedule a colposcopy appointment

## 2020-02-21 ASSESSMENT — ANXIETY QUESTIONNAIRES: GAD7 TOTAL SCORE: 2

## 2020-02-28 ENCOUNTER — OFFICE VISIT (OUTPATIENT)
Dept: OBGYN | Facility: CLINIC | Age: 40
End: 2020-02-28
Payer: COMMERCIAL

## 2020-02-28 VITALS
DIASTOLIC BLOOD PRESSURE: 83 MMHG | HEIGHT: 64 IN | BODY MASS INDEX: 30.01 KG/M2 | HEART RATE: 85 BPM | SYSTOLIC BLOOD PRESSURE: 120 MMHG | WEIGHT: 175.8 LBS

## 2020-02-28 DIAGNOSIS — R30.0 DYSURIA: ICD-10-CM

## 2020-02-28 DIAGNOSIS — R87.810 CERVICAL HIGH RISK HUMAN PAPILLOMAVIRUS (HPV) DNA TEST POSITIVE: Primary | ICD-10-CM

## 2020-02-28 LAB
ALBUMIN UR-MCNC: NEGATIVE MG/DL
APPEARANCE UR: ABNORMAL
BILIRUB UR QL STRIP: NEGATIVE
COLOR UR AUTO: YELLOW
GLUCOSE UR STRIP-MCNC: NEGATIVE MG/DL
HGB UR QL STRIP: ABNORMAL
KETONES UR STRIP-MCNC: NEGATIVE MG/DL
LEUKOCYTE ESTERASE UR QL STRIP: NEGATIVE
NITRATE UR QL: NEGATIVE
PH UR STRIP: 6 PH (ref 5–7)
SP GR UR STRIP: 1.01 (ref 1–1.03)
UROBILINOGEN UR STRIP-ACNC: 0.2 EU/DL (ref 0.2–1)

## 2020-02-28 PROCEDURE — G0463 HOSPITAL OUTPT CLINIC VISIT: HCPCS | Mod: ZF

## 2020-02-28 PROCEDURE — 81025 URINE PREGNANCY TEST: CPT | Mod: ZF | Performed by: STUDENT IN AN ORGANIZED HEALTH CARE EDUCATION/TRAINING PROGRAM

## 2020-02-28 PROCEDURE — 57454 BX/CURETT OF CERVIX W/SCOPE: CPT | Mod: ZF | Performed by: STUDENT IN AN ORGANIZED HEALTH CARE EDUCATION/TRAINING PROGRAM

## 2020-02-28 PROCEDURE — 81003 URINALYSIS AUTO W/O SCOPE: CPT

## 2020-02-28 PROCEDURE — 88305 TISSUE EXAM BY PATHOLOGIST: CPT | Performed by: OBSTETRICS & GYNECOLOGY

## 2020-02-28 PROCEDURE — 87086 URINE CULTURE/COLONY COUNT: CPT | Performed by: OBSTETRICS & GYNECOLOGY

## 2020-02-28 ASSESSMENT — MIFFLIN-ST. JEOR: SCORE: 1467.42

## 2020-02-28 ASSESSMENT — ANXIETY QUESTIONNAIRES
1. FEELING NERVOUS, ANXIOUS, OR ON EDGE: NOT AT ALL
2. NOT BEING ABLE TO STOP OR CONTROL WORRYING: SEVERAL DAYS
5. BEING SO RESTLESS THAT IT IS HARD TO SIT STILL: NOT AT ALL
6. BECOMING EASILY ANNOYED OR IRRITABLE: NOT AT ALL
3. WORRYING TOO MUCH ABOUT DIFFERENT THINGS: SEVERAL DAYS

## 2020-02-28 ASSESSMENT — PATIENT HEALTH QUESTIONNAIRE - PHQ9
5. POOR APPETITE OR OVEREATING: NOT AT ALL
SUM OF ALL RESPONSES TO PHQ QUESTIONS 1-9: 3

## 2020-02-28 NOTE — PROGRESS NOTES
"Colposcopy Visit/Procedure Note:    Yanet Reyes Avila is an 39 year old, , who comes in for diagnosis of abnormal pap screen. She had a pap smear 2020 which was NIL but HPV 16 and other HPV positive. She has a history of LSIL 2019 and colposcopy after that had been negative. She is a non-smoker. She reports some vaginal irritation that feels like burning. Only occurs intermittently. Denies any abnormal discharge. Also has some burning with urination if her bladder becomes too full. Has occurred since her delivery 2019.      Menstrual History:  Menstrual History 2019   LAST MENSTRUAL PERIOD 2019       Lab Results   Component Value Date    PAP NIL 2020       Last   Lab Results   Component Value Date    HPV16 Positive 2020     Last   Lab Results   Component Value Date    HPV18 Negative 2020     Last   Lab Results   Component Value Date    HRHPV Positive 2020       History   Smoking Status     Never Smoker   Smokeless Tobacco     Never Used       Allergies as of 2020     (No Known Allergies)        Colposcopy Procedure:    Consent:  Details of the colposcopic procedure were reviewed. Risks, benefits of treatment, and alternate forms of evaluation were discussed.  Patient's questions were elicited and answered.   Written consent was obtained and scanned into medical record.     Verification of Procedure  Just before the procedure begins, through verbal and active participation of team members, I verified:   Initials   Patient Name EF   Patient  EF   Procedure to be performed EF       OBJECTIVE: /83 (BP Location: Left arm, Patient Position: Chair)   Pulse 85   Ht 1.626 m (5' 4\")   Wt 79.7 kg (175 lb 12.8 oz)   Breastfeeding Yes   BMI 30.18 kg/m      Pelvic Exam:  EG/BUS: Normal genital architecture without lesions, erythema or abnormal secretions. Bartholin's, Urethra, Lahoma's glands are normal.   Vagina: moist, pink, rugae with creamy, white, odorless " and physiologic dischargesecretions  Cervix: no lesions  Rectum:anus normal    PROCEDURE:    Acetic acid and/or Lugol's solution applied to cervix.  Colposcopic exam of the cervix and apex of the vagina was conducted in the usual fashion.     Findings:  SCJ was seen entirely and the exam was satisfactory.    There were punctation noted at 8:00    Biopsies were  obtained at 8:00 and placed in labeled Formalin Jar.    ECC: was obtained and placed in labeled Formalin Jar.      Assessment: HPV related changes    Plan:     Biopsies sent to pathology.  Will contact patient with results with a phone call and  and recommended follow-up plan.     Patient advised to contact clinic with heavy vaginal bleeding, fever over 101 degrees F, or any other concerns.    Advised that evaluation of sexual contacts is NOT warranted as she can not get the same virus again. Risk of exposure to a NEW virus is possible with partner change.    Verbalized understanding and agreement with plan.    Oly Cordero MD  Obstetrics & Gynecology, PGY-2  2/28/2020 4:48 PM      I was present and assisted throughout the procedure,  I agree with the note above  Judy Tompkins MD

## 2020-02-28 NOTE — LETTER
3/4/2020         Yanet Reyes Avila   [address]  [facility] [address]        Dear Ms. Reyes Avila:    The results of your recent Urinalysis and Urine Culture were normal.     Results for orders placed or performed in visit on 02/28/20   Clinitek Urine Macroscopic POCT     Status: Abnormal   Result Value Ref Range    Color Urine Yellow     Appearance Urine Slightly Cloudy     Glucose Urine Negative NEG^Negative mg/dL    Bilirubin Urine Negative NEG^Negative    Ketones Urine Negative NEG^Negative mg/dL    Specific Gravity Urine 1.010 1.003 - 1.035    Blood Urine Trace (A) NEG^Negative    pH Urine 6.0 5.0 - 7.0 pH    Protein Albumin Urine Negative NEG^Negative mg/dL    Urobilinogen Urine 0.2 0.2 - 1.0 EU/dL    Nitrite Urine Negative NEG^Negative    Leukocyte Esterase Urine Negative NEG^Negative   hCG qual urine POCT     Status: None   Result Value Ref Range    HCG Qual Urine Negative neg    Internal QC OK Yes    Urine Culture Aerobic Bacterial     Status: None   Result Value Ref Range    Specimen Description Unspecified Urine     Special Requests Specimen received in preservative     Culture Micro No growth          You will be called about the results of your pap smear when they are back.    Please note that test explanations are brief and do not reflect all diagnostic uses.  If you have any questions or concerns, please call the clinic at [phone].      Sincerely,    Hamilton Smart MD

## 2020-02-29 LAB
BACTERIA SPEC CULT: NO GROWTH
Lab: NORMAL
SPECIMEN SOURCE: NORMAL

## 2020-03-02 LAB
HCG UR QL: NEGATIVE
INTERNAL QC OK POCT: YES

## 2020-03-04 LAB — COPATH REPORT: NORMAL

## 2020-03-18 ENCOUNTER — APPOINTMENT (OUTPATIENT)
Dept: INTERPRETER SERVICES | Facility: CLINIC | Age: 40
End: 2020-03-18
Payer: COMMERCIAL

## 2020-04-15 ENCOUNTER — TELEPHONE (OUTPATIENT)
Dept: CARE COORDINATION | Facility: CLINIC | Age: 40
End: 2020-04-15

## 2020-04-15 ENCOUNTER — APPOINTMENT (OUTPATIENT)
Dept: INTERPRETER SERVICES | Facility: CLINIC | Age: 40
End: 2020-04-15
Payer: COMMERCIAL

## 2020-04-15 NOTE — TELEPHONE ENCOUNTER
ALYSSA received phone call from Marni with  regarding a bill that she received for her breast pump.  She was charged for pump at the end of her daughter, Belen's, NICU admission and by this time her own insurance lapsed.  Marni does not qualify for insurance per information provided during daughter's NICU admission, and does not have means to cover the cost of this bill.  SW familiar with financial barriers and stressors from that time, and advocated on pt's behalf for financial assistance with bill.  Pt approved for financial assistance that , Riya, helping to coordinate and approve directly with [facility].  ALYSSA explained approved to Marni via phone with .  She expressed gratitude and had no further questions or concerns at this time.    Anabel Marshall, [facility]  Clinical   Maternal Child Health  Phone: [phone]  Pager: [phone]

## 2022-02-17 PROBLEM — O36.5931 POOR FETAL GROWTH AFFECTING MANAGEMENT OF MOTHER IN THIRD TRIMESTER, FETUS 1 OF MULTIPLE GESTATION: Status: RESOLVED | Noted: 2019-12-04 | Resolved: 2019-12-13

## (undated) DEVICE — SUCTION CANISTER MEDIVAC LINER 1500ML W/LID 65651-515

## (undated) DEVICE — STOCKING SLEEVE COMPRESSION CALF LG

## (undated) DEVICE — STRAP KNEE/BODY 31143004

## (undated) DEVICE — SOL WATER IRRIG 1000ML BOTTLE 07139-09

## (undated) DEVICE — PREP CHLORAPREP 26ML TINTED ORANGE  260815

## (undated) DEVICE — SOL NACL 0.9% IRRIG 1000ML BOTTLE 07138-09

## (undated) DEVICE — GLOVE ESTEEM POWDER FREE SMT 6.5  2D72PT65

## (undated) DEVICE — BASIN SET MAJOR

## (undated) DEVICE — PACK C-SECTION LF PL15OTA83B

## (undated) DEVICE — SU VICRYL 0 CT-1 36" J346H

## (undated) DEVICE — DRAPE SETUP C-SECTION INVISISHIELD 54X90" DYNJE5600

## (undated) DEVICE — GLOVE PROTEXIS BLUE W/NEU-THERA 6.5  2D73EB65

## (undated) DEVICE — BARRIER SEPRAFILM 5X6" SINGLE SHEET 4301-02

## (undated) DEVICE — CATH TRAY FOLEY 16FR BARDEX W/DRAIN BAG STATLOCK 300316A

## (undated) DEVICE — SU MONOCRYL 0 CT-1 36" Y346H

## (undated) DEVICE — DRSG STERI STRIP 1/2X4" R1547

## (undated) RX ORDER — FENTANYL CITRATE 50 UG/ML
INJECTION, SOLUTION INTRAMUSCULAR; INTRAVENOUS
Status: DISPENSED
Start: 2019-12-05

## (undated) RX ORDER — MORPHINE SULFATE 1 MG/ML
INJECTION, SOLUTION EPIDURAL; INTRATHECAL; INTRAVENOUS
Status: DISPENSED
Start: 2019-12-05

## (undated) RX ORDER — OXYTOCIN/0.9 % SODIUM CHLORIDE 30/500 ML
PLASTIC BAG, INJECTION (ML) INTRAVENOUS
Status: DISPENSED
Start: 2019-12-05